# Patient Record
Sex: MALE | Race: WHITE | URBAN - METROPOLITAN AREA
[De-identification: names, ages, dates, MRNs, and addresses within clinical notes are randomized per-mention and may not be internally consistent; named-entity substitution may affect disease eponyms.]

---

## 2017-06-02 ENCOUNTER — OUTPATIENT (OUTPATIENT)
Dept: OUTPATIENT SERVICES | Facility: HOSPITAL | Age: 42
LOS: 1 days | Discharge: HOME | End: 2017-06-02

## 2017-06-28 DIAGNOSIS — D64.9 ANEMIA, UNSPECIFIED: ICD-10-CM

## 2017-06-28 DIAGNOSIS — E78.00 PURE HYPERCHOLESTEROLEMIA, UNSPECIFIED: ICD-10-CM

## 2017-06-28 DIAGNOSIS — E53.8 DEFICIENCY OF OTHER SPECIFIED B GROUP VITAMINS: ICD-10-CM

## 2017-06-28 DIAGNOSIS — Z00.00 ENCOUNTER FOR GENERAL ADULT MEDICAL EXAMINATION WITHOUT ABNORMAL FINDINGS: ICD-10-CM

## 2017-06-28 DIAGNOSIS — E55.9 VITAMIN D DEFICIENCY, UNSPECIFIED: ICD-10-CM

## 2017-06-28 DIAGNOSIS — E05.90 THYROTOXICOSIS, UNSPECIFIED WITHOUT THYROTOXIC CRISIS OR STORM: ICD-10-CM

## 2017-06-28 DIAGNOSIS — R97.20 ELEVATED PROSTATE SPECIFIC ANTIGEN [PSA]: ICD-10-CM

## 2017-06-28 DIAGNOSIS — D50.9 IRON DEFICIENCY ANEMIA, UNSPECIFIED: ICD-10-CM

## 2017-06-28 DIAGNOSIS — N39.0 URINARY TRACT INFECTION, SITE NOT SPECIFIED: ICD-10-CM

## 2017-06-28 DIAGNOSIS — E11.9 TYPE 2 DIABETES MELLITUS WITHOUT COMPLICATIONS: ICD-10-CM

## 2023-03-09 ENCOUNTER — TRANSCRIPTION ENCOUNTER (OUTPATIENT)
Age: 48
End: 2023-03-09

## 2023-03-09 ENCOUNTER — OUTPATIENT (OUTPATIENT)
Dept: INPATIENT UNIT | Facility: HOSPITAL | Age: 48
LOS: 1 days | Discharge: HOME | End: 2023-03-09
Payer: COMMERCIAL

## 2023-03-09 VITALS
OXYGEN SATURATION: 96 % | SYSTOLIC BLOOD PRESSURE: 137 MMHG | TEMPERATURE: 99 F | HEART RATE: 65 BPM | DIASTOLIC BLOOD PRESSURE: 91 MMHG | RESPIRATION RATE: 17 BRPM

## 2023-03-09 VITALS
SYSTOLIC BLOOD PRESSURE: 142 MMHG | HEIGHT: 70 IN | DIASTOLIC BLOOD PRESSURE: 80 MMHG | HEART RATE: 72 BPM | TEMPERATURE: 97 F | WEIGHT: 153 LBS

## 2023-03-09 DIAGNOSIS — D13.6 BENIGN NEOPLASM OF PANCREAS: ICD-10-CM

## 2023-03-09 DIAGNOSIS — D13.0 BENIGN NEOPLASM OF ESOPHAGUS: ICD-10-CM

## 2023-03-09 DIAGNOSIS — Z90.49 ACQUIRED ABSENCE OF OTHER SPECIFIED PARTS OF DIGESTIVE TRACT: Chronic | ICD-10-CM

## 2023-03-09 DIAGNOSIS — K86.1 OTHER CHRONIC PANCREATITIS: ICD-10-CM

## 2023-03-09 DIAGNOSIS — K86.89 OTHER SPECIFIED DISEASES OF PANCREAS: ICD-10-CM

## 2023-03-09 DIAGNOSIS — K29.50 UNSPECIFIED CHRONIC GASTRITIS WITHOUT BLEEDING: ICD-10-CM

## 2023-03-09 PROCEDURE — 88305 TISSUE EXAM BY PATHOLOGIST: CPT

## 2023-03-09 PROCEDURE — 88173 CYTOPATH EVAL FNA REPORT: CPT | Mod: 26

## 2023-03-09 PROCEDURE — 43242 EGD US FINE NEEDLE BX/ASPIR: CPT

## 2023-03-09 PROCEDURE — 88173 CYTOPATH EVAL FNA REPORT: CPT

## 2023-03-09 PROCEDURE — 88312 SPECIAL STAINS GROUP 1: CPT | Mod: 26

## 2023-03-09 PROCEDURE — 88172 CYTP DX EVAL FNA 1ST EA SITE: CPT | Mod: 26

## 2023-03-09 PROCEDURE — 88172 CYTP DX EVAL FNA 1ST EA SITE: CPT

## 2023-03-09 PROCEDURE — 88312 SPECIAL STAINS GROUP 1: CPT

## 2023-03-09 PROCEDURE — 88305 TISSUE EXAM BY PATHOLOGIST: CPT | Mod: 26

## 2023-03-09 PROCEDURE — 43239 EGD BIOPSY SINGLE/MULTIPLE: CPT | Mod: XU

## 2023-03-09 NOTE — H&P PST ADULT - RESPIRATORY AND THORAX
Vaccine Information Statement(s) or the Emergency Use Authorization was given today. This has been reviewed, questions answered, and verbal consent given by Parent for injection(s) and administration of Meningococcal  and Meningococcal Serogroup B.        Patient tolerated without incident. See immunization grid for documentation.   negative

## 2023-03-09 NOTE — ASU DISCHARGE PLAN (ADULT/PEDIATRIC) - CARE PROVIDER_API CALL
Hipolito Post)  Gastroenterology  [address] [place] [address]  Phone: [phone]  Fax: [phone]  Established Patient  Follow Up Time: Routine

## 2023-03-09 NOTE — PRE-ANESTHESIA EVALUATION ADULT - BMI (KG/M2)
Intravenous Q8H    sodium chloride  33.3 mL Intravenous Q8H    chlorhexidine  15 mL Mouth/Throat BID    hydrALAZINE  25 mg Per NG tube 3 times per day    lisinopril  20 mg Per NG tube Daily    amiodarone  200 mg Per NG tube Q8H    amLODIPine  5 mg Per NG tube Daily    gabapentin  300 mg Per NG tube 2 times per day    lamoTRIgine  200 mg Per NG tube BID    melatonin  10 mg Per NG tube Nightly    metoprolol tartrate  50 mg Per NG tube BID    insulin glargine  10 Units Subcutaneous BID    heparin flush  3 mL Intravenous 2 times per day    divalproex  125 mg Oral BID    sodium chloride flush  10 mL Intravenous 2 times per day     PRN Meds: artificial tears **OR** polyvinyl alcohol, acetaminophen **OR** acetaminophen, sodium chloride flush, heparin flush, hydrALAZINE, labetalol, opium-belladonna, albuterol, glucose, dextrose, glucagon (rDNA), sodium chloride flush      Intake/Output Summary (Last 24 hours) at 3/13/2021 0744  Last data filed at 3/13/2021 0700  Gross per 24 hour   Intake 3208.6 ml   Output 2270 ml   Net 938.6 ml       Exam:    BP (!) 168/75   Pulse 63   Temp 97.9 °F (36.6 °C)   Resp (!) 48   Ht 5' 5\" (1.651 m)   Wt 147 lb 11.3 oz (67 kg)   SpO2 100%   BMI 24.58 kg/m²     General appearance: Intubated, on full vent support. No apparent distress. Respiratory: Clear to auscultation bilaterally. Cardiovascular: Normal S1/S2. Regular rhythm and rate. Abdomen: Soft, non-tender, non-distended with normal bowel sounds. Musculoskeletal: No clubbing, cyanosis, mild hand edema noted bilaterally. Skin: Skin color, texture, turgor normal.  No rashes or lesions.   Neurologic: Sedated, eyes open to voice but does not nod yes/no to answer questions or follow commands  Peripheral Pulses: +2 palpable, equal bilaterally       Labs:   Recent Labs     03/11/21  0445 03/11/21  1445 03/12/21  0530 03/13/21  0330   WBC 22.5*  --  18.2* 12.1*   HGB 6.3* 7.5* 7.5* 7.2*   HCT 20.6* 23.3* 24.2* 22.7*   PLT 231  --  219 241     Recent Labs     03/11/21  0445 03/12/21  0530 03/13/21  0330    145 139   K 3.3* 3.1* 3.6   * 108* 102   CO2 33* 32* 34*   BUN 36* 31* 26*   CREATININE 1.4* 1.1 1.2   CALCIUM 7.8* 7.8* 8.0*   PHOS 3.4 2.5 2.5     No results for input(s): AST, ALT, BILIDIR, BILITOT, ALKPHOS in the last 72 hours. No results for input(s): INR in the last 72 hours. No results for input(s): Efrain Jackelineon in the last 72 hours. Radiology:  XR CHEST PORTABLE   Final Result   New bibasilar infiltrates         XR CHEST PORTABLE   Final Result   Clearing of right basilar atelectasis/infiltrate         XR CHEST PORTABLE   Final Result   Patchy right lung base atelectasis or infiltrate         XR CHEST PORTABLE   Final Result   Moderate persistent elevation right hemidiaphragm         XR CHEST PORTABLE   Final Result   New nonspecific hazy confluent opacity in the right lung base may reflect   atelectasis and/or pneumonitis         XR CHEST PORTABLE   Final Result   1. Satisfactory position of the endotracheal tube and NG tube. XR CHEST PORTABLE   Final Result   Bilateral lower lobe pneumonia. CT ABDOMEN PELVIS WO CONTRAST Additional Contrast? None   Final Result   Hogan catheter is in the bladder in proper position. No indication for   hemorrhage along the trajectory of the Hogan catheter or conspicuous   hemorrhage in the bladder lumen. The bladder is not distended. Pattern of severe constipation with fecal rectal retention. Bi basilar infiltrates more prominent on the right side, pneumonia considered. XR ABDOMEN FOR NG/OG/NE TUBE PLACEMENT   Final Result   NG tube appears in satisfactory position         XR CHEST PORTABLE   Final Result   Increased left greater than right basilar opacities relative to prior   comparison which may be secondary to atelectasis, edema, or developing   infection in the appropriate clinical setting.          CT HEAD WO CONTRAST   Final Result   Postoperative changes in the left suboccipital region/left lobe of the   cerebellum. Tiny amount of hemorrhage in the surgical bed is similar to only   questionably slightly more pronounced and continued follow-up is recommended. No other significant change. CT HEAD WO CONTRAST PORTABLE   Final Result   1   1. Postsurgical changes including left suboccipital craniotomy defect. 2. No evidence of acute intracranial abnormality. 3. Atrophy and chronic white matter ischemic changes. MRI BRAIN W WO CONTRAST   Final Result   1. Somewhat limited evaluation due to patient motion artifact, especially on   the postcontrast enhanced images. 2. Multiple intracranial METASTASES, most notably in the left cerebellar   hemisphere. Metastatic lesion is also seen in the region of the mammillary   bodies. 3. HYDROCEPHALUS, with dilation of the lateral and 3rd ventricles. The   etiology is unclear. Findings may be due to mass effect on the distal   aqueduct from vasogenic edema in the left cerebellar hemisphere. 4.  The findings were submitted to the Radiology Results [address]   at 13:41 on 2/27/2021  to in be communicated to a licensed caregiver.       XR CHEST PORTABLE   Final Result   No radiographic evidence of acute abnormality      XR CHEST PORTABLE    (Results Pending)   XR CHEST PORTABLE    (Results Pending)             Active Hospital Problems    Diagnosis Date Noted    Acute respiratory failure with hypoxia and hypercapnia (HCC) [J96.01, J96.02]     Acute kidney injury superimposed on CKD ([facility] [N17.9, N18.9] 03/05/2021    Palliative care by specialist [Z51.5]     Melanoma ([facility] [C43.9] 02/26/2021    Hypokalemia [E87.6] 02/26/2021    Stage 3 chronic kidney disease [N18.30] 02/26/2021    COPD (chronic obstructive pulmonary disease) ([facility] [J44.9] 02/26/2021    Constipation [K59.00] 02/26/2021    Renal cyst, left [N28.1] 02/26/2021    AMS (altered mental status) [R41.82] 02/26/2021    D[facility] [F03.90] 02/26/2021    Seizure ([facility] [R56.9] 02/26/2021    History of CVA (cerebrovascular accident) [Z86.73] 02/26/2021    Atrial fibrillation ([facility] [I48.91] 02/26/2021    Anticoagulated [Z79.01] 02/26/2021    Secondary cancer of brain ([facility] [C79.31] 02/25/2021    Leucocytosis [D72.829] 12/15/2013    Non-insulin dependent type 2 diabetes mellitus ([facility] [E11.9]        Assessment  Brain mass  suspicious for metastatic brain neoplasm with unclear primary.   Had underwent frameless service static left suboccipital craniotomy for brain tumor biopsy and debulking on 3/2  Acute metabolic encephalopathy  likely secondary to above, improving  Acute respiratory failure with hypoxia  now intubated; on full vent support  Suspected aspiration pneumonia  Paroxysmal atrial fibrillation   in sinus rhythm  Dysphagia  Hyponatremia  likely secondary to poor oral intake, corrected   Acute on chronic anemia  hemoglobin improved status post PRBC transfusion  Type 2 diabetes mellitus with  Neuropathy  Hematuria  Seizure disorder  Hypertension  History of melanoma  Constipation    Plan:  Vent management and critical care per intensivist  On [place] Lundy; vancomycin discontinued per ID  Continue with Depakote Lamictal  Awaiting pathology report  Monitor H&H, transfuse as needed to keep hemoglobin above 7 g/dL  Follow respiratory cultures, NGTD  Continue with Norvasc, lisinopril, hydralazine, Lopressor  On Amiodarone gtt  Monitor renal function, replete electrolytes as needed  Basal and corrective bolus insulin regimen  Tube feed with free fluid via NG tube  Repeat head ct due to lack of improvement    DVT prophylaxis: Subcutaneous heparin    Diet: DIET TUBE FEED CONTINUOUS/CYCLIC NPO; Diabetic; Nasogastric; Continuous; 20; 55; 24  Code Status: Full Code    PT/OT Eval Status: N/A    Dispo -TBD    Severiano Fell, DO 3/13/2021 7:44 AM 22

## 2023-03-09 NOTE — ASU DISCHARGE PLAN (ADULT/PEDIATRIC) - NS MD DC FALL RISK RISK
For information on Fall & Injury Prevention, visit: [email or web address] OR  [email or web address] OR  https://www.cdc.gov/steadi/patient.html

## 2023-03-13 LAB — SURGICAL PATHOLOGY STUDY: SIGNIFICANT CHANGE UP

## 2023-03-14 LAB — NON-GYNECOLOGICAL CYTOLOGY STUDY: SIGNIFICANT CHANGE UP

## 2023-04-15 PROBLEM — K85.90 ACUTE PANCREATITIS WITHOUT NECROSIS OR INFECTION, UNSPECIFIED: Chronic | Status: ACTIVE | Noted: 2023-03-09

## 2023-04-20 ENCOUNTER — TRANSCRIPTION ENCOUNTER (OUTPATIENT)
Age: 48
End: 2023-04-20

## 2023-04-20 ENCOUNTER — OUTPATIENT (OUTPATIENT)
Dept: INPATIENT UNIT | Facility: HOSPITAL | Age: 48
LOS: 1 days | Discharge: HOME | End: 2023-04-20
Payer: COMMERCIAL

## 2023-04-20 ENCOUNTER — RESULT REVIEW (OUTPATIENT)
Age: 48
End: 2023-04-20

## 2023-04-20 VITALS
OXYGEN SATURATION: 98 % | HEART RATE: 61 BPM | SYSTOLIC BLOOD PRESSURE: 126 MMHG | DIASTOLIC BLOOD PRESSURE: 86 MMHG | RESPIRATION RATE: 18 BRPM

## 2023-04-20 VITALS
RESPIRATION RATE: 18 BRPM | HEIGHT: 70 IN | HEART RATE: 63 BPM | DIASTOLIC BLOOD PRESSURE: 108 MMHG | SYSTOLIC BLOOD PRESSURE: 137 MMHG | TEMPERATURE: 98 F | WEIGHT: 154.98 LBS

## 2023-04-20 DIAGNOSIS — Z90.49 ACQUIRED ABSENCE OF OTHER SPECIFIED PARTS OF DIGESTIVE TRACT: Chronic | ICD-10-CM

## 2023-04-20 DIAGNOSIS — D13.0 BENIGN NEOPLASM OF ESOPHAGUS: ICD-10-CM

## 2023-04-20 DIAGNOSIS — R93.2 ABNORMAL FINDINGS ON DIAGNOSTIC IMAGING OF LIVER AND BILIARY TRACT: ICD-10-CM

## 2023-04-20 PROCEDURE — C1769: CPT

## 2023-04-20 PROCEDURE — C2617: CPT

## 2023-04-20 PROCEDURE — C1889: CPT

## 2023-04-20 PROCEDURE — 74019 RADEX ABDOMEN 2 VIEWS: CPT

## 2023-04-20 PROCEDURE — 43262 ENDO CHOLANGIOPANCREATOGRAPH: CPT | Mod: XU

## 2023-04-20 PROCEDURE — 88104 CYTOPATH FL NONGYN SMEARS: CPT

## 2023-04-20 PROCEDURE — 43261 ENDO CHOLANGIOPANCREATOGRAPH: CPT | Mod: XU

## 2023-04-20 PROCEDURE — 88104 CYTOPATH FL NONGYN SMEARS: CPT | Mod: 26

## 2023-04-20 PROCEDURE — 74329 X-RAY FOR PANCREAS ENDOSCOPY: CPT | Mod: 26

## 2023-04-20 PROCEDURE — 43274 ERCP DUCT STENT PLACEMENT: CPT

## 2023-04-20 RX ORDER — SODIUM CHLORIDE 9 MG/ML
500 INJECTION, SOLUTION INTRAVENOUS
Refills: 0 | Status: DISCONTINUED | OUTPATIENT
Start: 2023-04-20 | End: 2023-04-20

## 2023-04-20 RX ORDER — INDOMETHACIN 50 MG
100 CAPSULE ORAL ONCE
Refills: 0 | Status: COMPLETED | OUTPATIENT
Start: 2023-04-20 | End: 2023-04-20

## 2023-04-20 RX ADMIN — SODIUM CHLORIDE 500 MILLILITER(S): 9 INJECTION, SOLUTION INTRAVENOUS at 16:32

## 2023-04-20 RX ADMIN — Medication 100 MILLIGRAM(S): at 16:09

## 2023-04-20 NOTE — ASU PREOP CHECKLIST - NS PREOP CHK HIBICLENS NA
N/A Cibinqo Counseling: I discussed with the patient the risks of Cibinqo therapy including but not limited to common cold, nausea, headache, cold sores, increased blood CPK levels, dizziness, UTIs, fatigue, acne, and vomitting. Live vaccines should be avoided.  This medication has been linked to serious infections; higher rate of mortality; malignancy and lymphoproliferative disorders; major adverse cardiovascular events; thrombosis; thrombocytopenia and lymphopenia; lipid elevations; and retinal detachment.

## 2023-04-20 NOTE — ASU PATIENT PROFILE, ADULT - FALL HARM RISK - PT AGE POPULATION HIDDEN
Your diagnostic test, CT stone, is being submitted for insurance approval. You will receive a phone call from central scheduling upon approval to schedule. If you don't receive a call within 7 days, please call to schedule your test Central [phone]    Your diagnostic test will be performed at:    [place] [address]    Follow up appointment after the CT has been performed          
Adult

## 2023-04-25 LAB — NON-GYNECOLOGICAL CYTOLOGY STUDY: SIGNIFICANT CHANGE UP

## 2023-04-26 DIAGNOSIS — F17.210 NICOTINE DEPENDENCE, CIGARETTES, UNCOMPLICATED: ICD-10-CM

## 2023-04-26 DIAGNOSIS — K86.89 OTHER SPECIFIED DISEASES OF PANCREAS: ICD-10-CM

## 2023-04-26 DIAGNOSIS — K86.1 OTHER CHRONIC PANCREATITIS: ICD-10-CM

## 2023-05-09 PROBLEM — Z00.00 ENCOUNTER FOR PREVENTIVE HEALTH EXAMINATION: Status: ACTIVE | Noted: 2023-05-09

## 2023-05-18 ENCOUNTER — TRANSCRIPTION ENCOUNTER (OUTPATIENT)
Age: 48
End: 2023-05-18

## 2023-05-18 ENCOUNTER — NON-APPOINTMENT (OUTPATIENT)
Age: 48
End: 2023-05-18

## 2023-05-18 ENCOUNTER — APPOINTMENT (OUTPATIENT)
Dept: SURGERY | Facility: CLINIC | Age: 48
End: 2023-05-18
Payer: COMMERCIAL

## 2023-05-18 VITALS
HEART RATE: 73 BPM | BODY MASS INDEX: 22.9 KG/M2 | OXYGEN SATURATION: 98 % | WEIGHT: 160 LBS | SYSTOLIC BLOOD PRESSURE: 124 MMHG | HEIGHT: 70 IN | DIASTOLIC BLOOD PRESSURE: 82 MMHG | TEMPERATURE: 97 F

## 2023-05-18 PROCEDURE — 99205 OFFICE O/P NEW HI 60 MIN: CPT

## 2023-05-21 NOTE — ASSESSMENT
[FreeTextEntry1] : AMY CARRILLO  is a pleasant 48 year year old man  who came in 2023 for pancreatic duct dilatation . 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, smoking > 30 years i pack/day, \par family hx: father  at 80s with CA, mother  at 40s from brain tumor and she had diabetes, \par \par CT chest 2023: done for smoking history showed calcifications in pancreas and dilated PD to 10mm, \par MRI 2023: 8MM PD\par 3/9/2023: EUS DILATED pd 5-7MM, HYPERECHOIC STRUCTURE 9MM UNCINATE, WITHIN MAIN PD SUGGESTIVE OF STONE\par He had pancreatic stent placed on  with resolution of his pain but possible returning back recently\par \par 2023: shared concern of diffuse dilated PD, possible stone vs neoplastic disease, sending for ca 19-9, new CTs, will discuss at GI tumor board and see him in couple weeks\par \par the above plan of care with discussed in details to the patient and all questions were answered to patient satisfaction. patient instructed to follow up with the referring physician and patient primary care provider\par \par A total of 70 minutes was spent on this visit, obtaining h/p,  reviewing previous notes/imaging/scopes, counseling the patient on possible etiology and procedure , ordering tests (above), and documenting the findings in the note.\par

## 2023-05-21 NOTE — HISTORY OF PRESENT ILLNESS
[de-identified] : AMY CARRILLO  is a pleasant 48 year year old man  who came in 2023 for pancreatic duct dilatation . 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

## 2023-05-24 ENCOUNTER — RESULT REVIEW (OUTPATIENT)
Age: 48
End: 2023-05-24

## 2023-05-25 ENCOUNTER — TRANSCRIPTION ENCOUNTER (OUTPATIENT)
Age: 48
End: 2023-05-25

## 2023-05-25 ENCOUNTER — OUTPATIENT (OUTPATIENT)
Dept: INPATIENT UNIT | Facility: HOSPITAL | Age: 48
LOS: 1 days | Discharge: HOME | End: 2023-05-25
Payer: COMMERCIAL

## 2023-05-25 VITALS
RESPIRATION RATE: 17 BRPM | TEMPERATURE: 98 F | HEART RATE: 67 BPM | OXYGEN SATURATION: 95 % | DIASTOLIC BLOOD PRESSURE: 77 MMHG | SYSTOLIC BLOOD PRESSURE: 126 MMHG

## 2023-05-25 VITALS
HEART RATE: 57 BPM | DIASTOLIC BLOOD PRESSURE: 79 MMHG | TEMPERATURE: 97 F | RESPIRATION RATE: 18 BRPM | SYSTOLIC BLOOD PRESSURE: 129 MMHG | WEIGHT: 158.07 LBS

## 2023-05-25 DIAGNOSIS — Z90.49 ACQUIRED ABSENCE OF OTHER SPECIFIED PARTS OF DIGESTIVE TRACT: Chronic | ICD-10-CM

## 2023-05-25 DIAGNOSIS — K86.1 OTHER CHRONIC PANCREATITIS: ICD-10-CM

## 2023-05-25 DIAGNOSIS — D13.6 BENIGN NEOPLASM OF PANCREAS: ICD-10-CM

## 2023-05-25 PROCEDURE — 43276 ERCP STENT EXCHANGE W/DILATE: CPT

## 2023-05-25 PROCEDURE — 88112 CYTOPATH CELL ENHANCE TECH: CPT | Mod: 26

## 2023-05-25 PROCEDURE — 74328 X-RAY BILE DUCT ENDOSCOPY: CPT | Mod: 26

## 2023-05-25 PROCEDURE — 88342 IMHCHEM/IMCYTCHM 1ST ANTB: CPT

## 2023-05-25 PROCEDURE — 88341 IMHCHEM/IMCYTCHM EA ADD ANTB: CPT

## 2023-05-25 PROCEDURE — C1889: CPT

## 2023-05-25 PROCEDURE — 43261 ENDO CHOLANGIOPANCREATOGRAPH: CPT | Mod: XU

## 2023-05-25 PROCEDURE — 74018 RADEX ABDOMEN 1 VIEW: CPT

## 2023-05-25 PROCEDURE — C1769: CPT

## 2023-05-25 PROCEDURE — 88342 IMHCHEM/IMCYTCHM 1ST ANTB: CPT | Mod: 26

## 2023-05-25 PROCEDURE — 88341 IMHCHEM/IMCYTCHM EA ADD ANTB: CPT | Mod: 26

## 2023-05-25 PROCEDURE — 88112 CYTOPATH CELL ENHANCE TECH: CPT

## 2023-05-25 PROCEDURE — C2617: CPT

## 2023-05-25 RX ORDER — INDOMETHACIN 50 MG
100 CAPSULE ORAL ONCE
Refills: 0 | Status: COMPLETED | OUTPATIENT
Start: 2023-05-25 | End: 2023-05-25

## 2023-05-25 RX ADMIN — Medication 100 MILLIGRAM(S): at 13:26

## 2023-05-25 NOTE — ASU DISCHARGE PLAN (ADULT/PEDIATRIC) - NS MD DC FALL RISK RISK
For information on Fall & Injury Prevention, visit: [email or web address] OR  [email or web address] OR  https://www.cdc.gov/steadi/patient.html

## 2023-05-25 NOTE — ASU PATIENT PROFILE, ADULT - FALL HARM RISK - UNIVERSAL INTERVENTIONS
Bed in lowest position, wheels locked, appropriate side rails in place/Call bell, personal items and telephone in reach/Instruct patient to call for assistance before getting out of bed or chair/Non-slip footwear when patient is out of bed/[place] to call system/Physically safe environment - no spills, clutter or unnecessary equipment/Purposeful Proactive Rounding/Room/bathroom lighting operational, light cord in reach

## 2023-05-25 NOTE — PRE-ANESTHESIA EVALUATION ADULT - NSANTHPMHFT_GEN_ALL_CORE
49 yo smoker with chronic pancreatitis, pancreatic duct stone and dilation s/p ERCP and pancreatic stent in March 2023 returns with epigastric and back pain. Possible dislodgement of stent, presents today to repeat ERCP, stent removal, stent replacement.    PMH as above  PSH: appendectomy, ERCP, EUS

## 2023-05-25 NOTE — ASU PATIENT PROFILE, ADULT - TEACHING/LEARNING OTHER LEARNERS
Received trospium 60 mg refill request.  Refilled.  Patient has UDS coming up later this month.  
spouse

## 2023-05-30 DIAGNOSIS — K83.1 OBSTRUCTION OF BILE DUCT: ICD-10-CM

## 2023-05-30 DIAGNOSIS — Z90.49 ACQUIRED ABSENCE OF OTHER SPECIFIED PARTS OF DIGESTIVE TRACT: ICD-10-CM

## 2023-05-30 DIAGNOSIS — K86.1 OTHER CHRONIC PANCREATITIS: ICD-10-CM

## 2023-05-30 DIAGNOSIS — F17.210 NICOTINE DEPENDENCE, CIGARETTES, UNCOMPLICATED: ICD-10-CM

## 2023-06-07 LAB — NON-GYNECOLOGICAL CYTOLOGY STUDY: SIGNIFICANT CHANGE UP

## 2023-06-20 ENCOUNTER — APPOINTMENT (OUTPATIENT)
Dept: SURGERY | Facility: CLINIC | Age: 48
End: 2023-06-20
Payer: COMMERCIAL

## 2023-06-20 VITALS
BODY MASS INDEX: 23.05 KG/M2 | SYSTOLIC BLOOD PRESSURE: 122 MMHG | TEMPERATURE: 96.9 F | DIASTOLIC BLOOD PRESSURE: 74 MMHG | WEIGHT: 161 LBS | HEIGHT: 70 IN | HEART RATE: 78 BPM | OXYGEN SATURATION: 98 %

## 2023-06-20 PROCEDURE — 99215 OFFICE O/P EST HI 40 MIN: CPT

## 2023-06-29 NOTE — ASSESSMENT
[FreeTextEntry1] : AMY CARRILLO  is a pleasant 48 year year old man  who came in 2023 for pancreatic duct dilatation . 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, smoking > 30 years i pack/day, \par family hx: father  at 80s with CA, mother  at 40s from brain tumor and she had diabetes, \par \par CT chest 2023: done for smoking history showed calcifications in pancreas and dilated PD to 10mm, \par MRI 2023: 8MM PD\par 3/9/2023: EUS DILATED pd 5-7MM, HYPERECHOIC STRUCTURE 9MM UNCINATE, WITHIN MAIN PD SUGGESTIVE OF STONE\par He had pancreatic stent placed on  with resolution of his pain but possible returning back recently\par \par 2023: shared concern of diffuse dilated PD, possible stone vs neoplastic disease, sending for ca 19-9, new CTs, will discuss at GI tumor board and see him in couple weeks\par \par : he was recently admitted to [facility] for severe pain with ERCP done with change of his PD on may , he feels better now, brushing during ERCP showed atypical cells, 2023-> MRI showed enlarged head of pancreas suggestive of mild localized pancreatitis \par i discussed in details the potential of stone vs neoplastic process in head of pancreas, will need to discuss at GI tumor board and patient and his friend they have my cell to contact me in couple weeks to get the final decision, we discussed stone removal by various endoscopic technique and will discuss this with dr Post,he has coming CT in early 2023\par \par the above plan of care with discussed in details to the patient and all questions were answered to patient satisfaction. patient instructed to follow up with the referring physician and patient primary care provider\par \par A total of 45 minutes was spent on this visit, obtaining h/p,  reviewing previous notes/imaging/scopes, counseling the patient on possible etiology and procedure , ordering tests (above), and documenting the findings in the note.\par

## 2023-06-29 NOTE — HISTORY OF PRESENT ILLNESS
[de-identified] : AMY CARRILLO  is a pleasant 48 year year old man  who came in 2023 for pancreatic duct dilatation . 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, smoking > 30 years i pack/day, \par family hx: father  at 80s with CA, mother  at 40s from brain tumor and she had diabetes, \par \par CT chest 2023: done for smoking history showed calcifications in pancreas and dilated PD to 10mm, \par MRI 2023: 8MM PD\par 3/9/2023: EUS DILATED pd 5-7MM, HYPERECHOIC STRUCTURE 9MM UNCINATE, WITHIN MAIN PD SUGGESTIVE OF STONE\par He had pancreatic stent placed on  with resolution of his pain but possible returning back recently\par \par : he was recently admitted to [facility] for severe pain with ERCP done with change of his PD on may , he feels better now, brushing during ERCP showed atypical cells, 2023-> MRI showed enlarged head of pancreas suggestive of mild localized pancreatitis \par \par   \par \par

## 2023-07-10 ENCOUNTER — NON-APPOINTMENT (OUTPATIENT)
Age: 48
End: 2023-07-10

## 2023-08-06 ENCOUNTER — RESULT REVIEW (OUTPATIENT)
Age: 48
End: 2023-08-06

## 2023-08-07 ENCOUNTER — OUTPATIENT (OUTPATIENT)
Dept: INPATIENT UNIT | Facility: HOSPITAL | Age: 48
LOS: 1 days | Discharge: HOME | End: 2023-08-07
Payer: COMMERCIAL

## 2023-08-07 ENCOUNTER — TRANSCRIPTION ENCOUNTER (OUTPATIENT)
Age: 48
End: 2023-08-07

## 2023-08-07 VITALS
WEIGHT: 162.04 LBS | RESPIRATION RATE: 18 BRPM | HEIGHT: 70 IN | HEART RATE: 65 BPM | DIASTOLIC BLOOD PRESSURE: 100 MMHG | SYSTOLIC BLOOD PRESSURE: 151 MMHG | TEMPERATURE: 98 F

## 2023-08-07 VITALS
DIASTOLIC BLOOD PRESSURE: 85 MMHG | SYSTOLIC BLOOD PRESSURE: 130 MMHG | HEART RATE: 65 BPM | RESPIRATION RATE: 18 BRPM | OXYGEN SATURATION: 97 %

## 2023-08-07 DIAGNOSIS — Z90.49 ACQUIRED ABSENCE OF OTHER SPECIFIED PARTS OF DIGESTIVE TRACT: Chronic | ICD-10-CM

## 2023-08-07 DIAGNOSIS — K86.1 OTHER CHRONIC PANCREATITIS: ICD-10-CM

## 2023-08-07 DIAGNOSIS — R93.2 ABNORMAL FINDINGS ON DIAGNOSTIC IMAGING OF LIVER AND BILIARY TRACT: ICD-10-CM

## 2023-08-07 DIAGNOSIS — Z98.890 OTHER SPECIFIED POSTPROCEDURAL STATES: Chronic | ICD-10-CM

## 2023-08-07 PROCEDURE — C9399: CPT

## 2023-08-07 PROCEDURE — C1769: CPT

## 2023-08-07 PROCEDURE — 74329 X-RAY FOR PANCREAS ENDOSCOPY: CPT | Mod: 26

## 2023-08-07 PROCEDURE — 43276 ERCP STENT EXCHANGE W/DILATE: CPT

## 2023-08-07 PROCEDURE — 74019 RADEX ABDOMEN 2 VIEWS: CPT

## 2023-08-07 PROCEDURE — C1889: CPT

## 2023-08-07 PROCEDURE — C2617: CPT

## 2023-08-07 PROCEDURE — 88112 CYTOPATH CELL ENHANCE TECH: CPT | Mod: 26

## 2023-08-07 PROCEDURE — 88305 TISSUE EXAM BY PATHOLOGIST: CPT | Mod: 26

## 2023-08-07 PROCEDURE — 88112 CYTOPATH CELL ENHANCE TECH: CPT

## 2023-08-07 PROCEDURE — 43261 ENDO CHOLANGIOPANCREATOGRAPH: CPT | Mod: XU

## 2023-08-07 PROCEDURE — 88305 TISSUE EXAM BY PATHOLOGIST: CPT

## 2023-08-07 RX ORDER — LIDOCAINE 4 G/100G
0 CREAM TOPICAL
Qty: 0 | Refills: 0 | DISCHARGE

## 2023-08-07 RX ORDER — SODIUM CHLORIDE 9 MG/ML
1000 INJECTION, SOLUTION INTRAVENOUS ONCE
Refills: 0 | Status: DISCONTINUED | OUTPATIENT
Start: 2023-08-07 | End: 2023-08-07

## 2023-08-07 RX ORDER — ACETAMINOPHEN WITH CODEINE 300MG-30MG
2 TABLET ORAL ONCE
Refills: 0 | Status: DISCONTINUED | OUTPATIENT
Start: 2023-08-07 | End: 2023-08-07

## 2023-08-07 RX ORDER — INDOMETHACIN 50 MG
100 CAPSULE ORAL ONCE
Refills: 0 | Status: COMPLETED | OUTPATIENT
Start: 2023-08-07 | End: 2023-08-07

## 2023-08-07 RX ORDER — SODIUM CHLORIDE 9 MG/ML
1000 INJECTION, SOLUTION INTRAVENOUS
Refills: 0 | Status: DISCONTINUED | OUTPATIENT
Start: 2023-08-07 | End: 2023-08-07

## 2023-08-07 RX ADMIN — Medication 100 MILLIGRAM(S): at 11:01

## 2023-08-07 NOTE — ASU PATIENT PROFILE, ADULT - NSICDXPASTSURGICALHX_GEN_ALL_CORE_FT
PAST SURGICAL HISTORY:  S/P appendectomy      PAST SURGICAL HISTORY:  History of ERCP with pancreatic stent placement    S/P appendectomy

## 2023-08-07 NOTE — ASU PATIENT PROFILE, ADULT - BLOOD TRANSFUSION, PREVIOUS, PROFILE
Patient is here today for re-evaluation of type 2 diabetes which is better controlled.  The patient has lost so lost approximately 16 lb on Trulicity and just increase the dose to 1.5 mg weekly.  She denies any chest pain or excessive dyspnea.  She needs an order for a new ankle brace.The remainder of review systems is essentially negative as reviewed.  PAST MEDICAL HX:    Glucose intolerance (impaired glucose toleranc*               HTN (hypertension)                                            Hyperlipidemia                                                Obesity                                                       Stool incontinence                                              Comment: and urgency    Fibroadenoma of breast                                          Comment: left    Asthma                                                        Rosacea                                                       CTS (carpal tunnel syndrome)                                  Colon polyps                                                  DVT (deep venous thrombosis) (CMS/HCC)                        PAST SURGICAL HX:    CARPAL TUNNEL RELEASE                           2008    BREAST FIBROADENOMA SURGERY                     2007    BIOPSY OF BREAST, NEEDLE CORE                   2005    TOTAL ABDOMINAL HYSTERECTOMY                                  COLONOSCOPY                                     2011          Comment: REPEAT IN 5 YEARS    EGD                                             2020       COLONOSCOPY                                     2020         Comment: repeat in 3 years     HYSTERECTOMY                                                  Family History   Problem Relation Age of Onset   • Coronary Artery Disease Mother    • Coronary Artery Disease Father    • Cancer, Breast Sister    • Hypertension Other      Review of patient's family status indicates:    Mother                                            Father                                           Sister                         Alive                     Sister                         Alive                     Brother                        Alive                     Other                                                      Social History     Socioeconomic History   • Marital status: /Civil Union     Spouse name: Not on file   • Number of children: Not on file   • Years of education: Not on file   • Highest education level: Not on file   Occupational History   • Not on file   Tobacco Use   • Smoking status: Never Smoker   • Smokeless tobacco: Never Used   Vaping Use   • Vaping Use: never used   Substance and Sexual Activity   • Alcohol use: Yes     Alcohol/week: 0.8 standard drinks     Types: 1 Standard drinks or equivalent per week   • Drug use: No   • Sexual activity: Not on file   Other Topics Concern   • Not on file   Social History Narrative   • Not on file     Social Determinants of Health     Financial Resource Strain: Not on file   Food Insecurity: Not on file   Transportation Needs: Not on file   Physical Activity: Not on file   Stress: Not on file   Social Connections: Not on file   Intimate Partner Violence: Not At Risk   • Social Determinants: Intimate Partner Violence Past Fear: No   • Social Determinants: Intimate Partner Violence Current Fear: No     On physical examination she is a 58-year-old female.  Visit Vitals  BP (!) 138/91 (BP Location: RUE - Right upper extremity, Patient Position: Sitting, Cuff Size: Large Adult)   Pulse 69   Ht 5' 2\" (1.575 m)   Wt 90.7 kg (200 lb)   BMI 36.58 kg/m²     SKIN:  Warm and hydrated without evidence of skin rashes, abnormal lesions, or palpable skin abnormalities.   HEENT:  Unremarkable.   CHEST:  Clear to auscultation with no wheezing or rales, normal to percussion.   HEART:  S1, S2, regular, rhythmic, with no murmur or rub.   ABDOMEN:  Soft, nontender, nondistended, with normal  bowel sounds.  No evidence of hepatosplenomegaly.   EXTREMITIES:  No pedal edema and normal peripheral pulses.   There is no evidence of calluses ulceration and sensation is preserved.      Assessment:     1. Essential hypertension recheck blood pressure   2. Class 2 obesity due to excess calories without serious comorbidity with body mass index (BMI) of 35.0 to 35.9 in adult continue Trulicity the higher dose   3. Mixed hyperlipidemia stable continue the same   4. Type 2 diabetes mellitus without complication, without long-term current use of insulin (CMS/[facility]) see above   5. Medication monitoring encounter    6. Health care maintenance return in six months for a complete physical exam.     Patient receive vaccination through her 's workplace.   no

## 2023-08-09 ENCOUNTER — NON-APPOINTMENT (OUTPATIENT)
Age: 48
End: 2023-08-09

## 2023-08-10 DIAGNOSIS — K86.1 OTHER CHRONIC PANCREATITIS: ICD-10-CM

## 2023-08-10 DIAGNOSIS — K86.89 OTHER SPECIFIED DISEASES OF PANCREAS: ICD-10-CM

## 2023-08-10 LAB — NON-GYNECOLOGICAL CYTOLOGY STUDY: SIGNIFICANT CHANGE UP

## 2023-08-14 PROBLEM — M54.9 DORSALGIA, UNSPECIFIED: Chronic | Status: ACTIVE | Noted: 2023-08-07

## 2023-09-01 ENCOUNTER — NON-APPOINTMENT (OUTPATIENT)
Age: 48
End: 2023-09-01

## 2023-09-05 ENCOUNTER — APPOINTMENT (OUTPATIENT)
Dept: SURGERY | Facility: CLINIC | Age: 48
End: 2023-09-05

## 2023-10-04 ENCOUNTER — APPOINTMENT (OUTPATIENT)
Dept: GASTROENTEROLOGY | Facility: CLINIC | Age: 48
End: 2023-10-04
Payer: COMMERCIAL

## 2023-10-04 DIAGNOSIS — D49.0 NEOPLASM OF UNSPECIFIED BEHAVIOR OF DIGESTIVE SYSTEM: ICD-10-CM

## 2023-10-04 PROCEDURE — 99443: CPT

## 2023-10-05 RX ORDER — OXYCODONE AND ACETAMINOPHEN 10; 325 MG/1; MG/1
10-325 TABLET ORAL
Refills: 0 | Status: ACTIVE | COMMUNITY

## 2023-10-12 ENCOUNTER — TRANSCRIPTION ENCOUNTER (OUTPATIENT)
Age: 48
End: 2023-10-12

## 2023-10-12 ENCOUNTER — OUTPATIENT (OUTPATIENT)
Dept: OUTPATIENT SERVICES | Facility: HOSPITAL | Age: 48
LOS: 1 days | Discharge: HOME | End: 2023-10-12
Payer: COMMERCIAL

## 2023-10-12 VITALS
HEART RATE: 58 BPM | DIASTOLIC BLOOD PRESSURE: 74 MMHG | OXYGEN SATURATION: 96 % | RESPIRATION RATE: 18 BRPM | SYSTOLIC BLOOD PRESSURE: 131 MMHG

## 2023-10-12 VITALS
DIASTOLIC BLOOD PRESSURE: 79 MMHG | RESPIRATION RATE: 18 BRPM | TEMPERATURE: 97 F | HEIGHT: 69 IN | SYSTOLIC BLOOD PRESSURE: 124 MMHG | WEIGHT: 166.01 LBS | HEART RATE: 55 BPM

## 2023-10-12 DIAGNOSIS — K86.89 OTHER SPECIFIED DISEASES OF PANCREAS: ICD-10-CM

## 2023-10-12 DIAGNOSIS — Z98.890 OTHER SPECIFIED POSTPROCEDURAL STATES: Chronic | ICD-10-CM

## 2023-10-12 DIAGNOSIS — Z90.49 ACQUIRED ABSENCE OF OTHER SPECIFIED PARTS OF DIGESTIVE TRACT: Chronic | ICD-10-CM

## 2023-10-12 DIAGNOSIS — D49.0 NEOPLASM OF UNSPECIFIED BEHAVIOR OF DIGESTIVE SYSTEM: ICD-10-CM

## 2023-10-12 PROCEDURE — 43264 ERCP REMOVE DUCT CALCULI: CPT | Mod: XU

## 2023-10-12 PROCEDURE — 74018 RADEX ABDOMEN 1 VIEW: CPT

## 2023-10-12 PROCEDURE — 43276 ERCP STENT EXCHANGE W/DILATE: CPT

## 2023-10-12 PROCEDURE — 43262 ENDO CHOLANGIOPANCREATOGRAPH: CPT | Mod: XU

## 2023-10-12 PROCEDURE — C1769: CPT

## 2023-10-12 PROCEDURE — C1889: CPT

## 2023-10-12 PROCEDURE — 43274 ERCP DUCT STENT PLACEMENT: CPT | Mod: XU

## 2023-10-12 PROCEDURE — C2617: CPT

## 2023-10-12 PROCEDURE — 74330 X-RAY BILE/PANC ENDOSCOPY: CPT | Mod: 26

## 2023-10-12 RX ORDER — INDOMETHACIN 50 MG
100 CAPSULE ORAL ONCE
Refills: 0 | Status: DISCONTINUED | OUTPATIENT
Start: 2023-10-12 | End: 2023-10-12

## 2023-10-12 NOTE — ASU PATIENT PROFILE, ADULT - PATIENT KNOW
Patient presents to the clinic today for catheter bag exchange. Patient was discharged from the hospital today after an 8 day stay d/t a heart attack. His catheter was exchanged in the hospital on 9/5/19, so the catheter doesn't need to be exchanged today, as it is draining fine and looks patent. Patient was discharged home with an overnight drainage bag with a plastic measuring container attached to the outside, which the patient wasn't too fond of. Overnight drainage bag removed and a leg bag was attached. Patient will follow up in 3 weeks for his catheter change.  
yes

## 2023-10-12 NOTE — ASU PREOP CHECKLIST - IV STARTED
What Type Of Note Output Would You Prefer (Optional)?: Standard Output Is The Patient Presenting As Previously Scheduled?: Yes How Severe Is Your Rash?: mild Is This A New Presentation, Or A Follow-Up?: Rash Additional History: Pts pediatrician thinks he has Acanthosis nigricans. no

## 2023-10-12 NOTE — ASU PATIENT PROFILE, ADULT - SURGICAL SITE INCISION
no Otolaryngologist Procedure Text (A): After obtaining clear surgical margins the patient was sent to otolaryngology for surgical repair.  The patient understands they will receive post-surgical care and follow-up from the referring physician's office.

## 2023-10-12 NOTE — ASU PATIENT PROFILE, ADULT - FALL HARM RISK - UNIVERSAL INTERVENTIONS
Bed in lowest position, wheels locked, appropriate side rails in place/Call bell, personal items and telephone in reach/Instruct patient to call for assistance before getting out of bed or chair/Non-slip footwear when patient is out of bed/[place] to call system/Physically safe environment - no spills, clutter or unnecessary equipment/Purposeful Proactive Rounding/Room/bathroom lighting operational, light cord in reach

## 2023-10-12 NOTE — ASU DISCHARGE PLAN (ADULT/PEDIATRIC) - CARE PROVIDER_API CALL
Hipolito Post  Gastroenterology  [address] [place] [address]  Phone: [phone]  Fax: [phone]  Follow Up Time:

## 2023-10-12 NOTE — CHART NOTE - NSCHARTNOTEFT_GEN_A_CORE
PACU ANESTHESIA ADMISSION NOTE      Procedure: ercp stent removal and replacement  Post op diagnosis:      ____  Intubated  TV:______       Rate: ______      FiO2: ______    __x__  Patent Airway    ____  Full return of protective reflexes    ____  Full recovery from anesthesia / back to baseline     Vitals:   T:  97.2         R: 16                 BP:   161/114               Sat:  100%                 P: 83      Mental Status:  __x__ Awake   _____ Alert   _____ Drowsy   _____ Sedated    Nausea/Vomiting:  ____ NO  ______Yes,   See Post - Op Orders          Pain Scale (0-10):  _____    Treatment: ____ None    ___x_ See Post - Op/PCA Orders    Post - Operative Fluids:   ____ Oral   __x__ See Post - Op Orders    Plan: Discharge:   __x__Home       _____Floor     _____Critical Care    _____  Other:_________________    Comments: Pt tolerated procedure well, no anesthesia related complications. Care of pt endorsed to PACU, report given to PACU RN. Discharge when criteria are met.

## 2023-10-18 DIAGNOSIS — K86.89 OTHER SPECIFIED DISEASES OF PANCREAS: ICD-10-CM

## 2023-10-23 ENCOUNTER — NON-APPOINTMENT (OUTPATIENT)
Age: 48
End: 2023-10-23

## 2023-10-25 ENCOUNTER — APPOINTMENT (OUTPATIENT)
Dept: GASTROENTEROLOGY | Facility: CLINIC | Age: 48
End: 2023-10-25
Payer: COMMERCIAL

## 2023-10-25 ENCOUNTER — RESULT REVIEW (OUTPATIENT)
Age: 48
End: 2023-10-25

## 2023-10-25 PROCEDURE — 99214 OFFICE O/P EST MOD 30 MIN: CPT | Mod: 95

## 2023-11-02 ENCOUNTER — OUTPATIENT (OUTPATIENT)
Dept: OUTPATIENT SERVICES | Facility: HOSPITAL | Age: 48
LOS: 1 days | End: 2023-11-02
Payer: COMMERCIAL

## 2023-11-02 DIAGNOSIS — Z98.890 OTHER SPECIFIED POSTPROCEDURAL STATES: Chronic | ICD-10-CM

## 2023-11-02 DIAGNOSIS — K86.9 DISEASE OF PANCREAS, UNSPECIFIED: ICD-10-CM

## 2023-11-02 DIAGNOSIS — Z90.49 ACQUIRED ABSENCE OF OTHER SPECIFIED PARTS OF DIGESTIVE TRACT: Chronic | ICD-10-CM

## 2023-11-02 PROCEDURE — 74018 RADEX ABDOMEN 1 VIEW: CPT

## 2023-11-02 PROCEDURE — 76000 FLUOROSCOPY <1 HR PHYS/QHP: CPT | Mod: 26

## 2023-11-03 DIAGNOSIS — K86.9 DISEASE OF PANCREAS, UNSPECIFIED: ICD-10-CM

## 2023-11-22 ENCOUNTER — APPOINTMENT (OUTPATIENT)
Dept: GASTROENTEROLOGY | Facility: CLINIC | Age: 48
End: 2023-11-22
Payer: COMMERCIAL

## 2023-11-22 PROCEDURE — 99213 OFFICE O/P EST LOW 20 MIN: CPT | Mod: 95

## 2023-11-28 RX ORDER — PANCRELIPASE 36000; 180000; 114000 [USP'U]/1; [USP'U]/1; [USP'U]/1
36000-114000 CAPSULE, DELAYED RELEASE PELLETS ORAL 3 TIMES DAILY
Qty: 270 | Refills: 3 | Status: ACTIVE | COMMUNITY
Start: 2023-11-27 | End: 1900-01-01

## 2023-12-01 ENCOUNTER — EMERGENCY (EMERGENCY)
Facility: HOSPITAL | Age: 48
LOS: 0 days | Discharge: LEFT BEFORE DISCHARGE | End: 2023-12-01
Attending: STUDENT IN AN ORGANIZED HEALTH CARE EDUCATION/TRAINING PROGRAM
Payer: COMMERCIAL

## 2023-12-01 VITALS
RESPIRATION RATE: 20 BRPM | TEMPERATURE: 98 F | OXYGEN SATURATION: 99 % | HEIGHT: 69 IN | DIASTOLIC BLOOD PRESSURE: 96 MMHG | WEIGHT: 162.92 LBS | HEART RATE: 85 BPM | SYSTOLIC BLOOD PRESSURE: 131 MMHG

## 2023-12-01 DIAGNOSIS — Z53.29 PROCEDURE AND TREATMENT NOT CARRIED OUT BECAUSE OF PATIENT'S DECISION FOR OTHER REASONS: ICD-10-CM

## 2023-12-01 DIAGNOSIS — Z87.19 PERSONAL HISTORY OF OTHER DISEASES OF THE DIGESTIVE SYSTEM: ICD-10-CM

## 2023-12-01 DIAGNOSIS — R74.02 ELEVATION OF LEVELS OF LACTIC ACID DEHYDROGENASE [LDH]: ICD-10-CM

## 2023-12-01 DIAGNOSIS — K85.90 ACUTE PANCREATITIS WITHOUT NECROSIS OR INFECTION, UNSPECIFIED: ICD-10-CM

## 2023-12-01 DIAGNOSIS — Z90.49 ACQUIRED ABSENCE OF OTHER SPECIFIED PARTS OF DIGESTIVE TRACT: Chronic | ICD-10-CM

## 2023-12-01 DIAGNOSIS — R10.13 EPIGASTRIC PAIN: ICD-10-CM

## 2023-12-01 DIAGNOSIS — Z98.890 OTHER SPECIFIED POSTPROCEDURAL STATES: Chronic | ICD-10-CM

## 2023-12-01 DIAGNOSIS — K86.1 OTHER CHRONIC PANCREATITIS: ICD-10-CM

## 2023-12-01 DIAGNOSIS — Z90.49 ACQUIRED ABSENCE OF OTHER SPECIFIED PARTS OF DIGESTIVE TRACT: ICD-10-CM

## 2023-12-01 DIAGNOSIS — Z96.89 PRESENCE OF OTHER SPECIFIED FUNCTIONAL IMPLANTS: ICD-10-CM

## 2023-12-01 LAB
ALBUMIN SERPL ELPH-MCNC: 4.8 G/DL — SIGNIFICANT CHANGE UP (ref 3.5–5.2)
ALBUMIN SERPL ELPH-MCNC: 4.8 G/DL — SIGNIFICANT CHANGE UP (ref 3.5–5.2)
ALP SERPL-CCNC: 85 U/L — SIGNIFICANT CHANGE UP (ref 30–115)
ALP SERPL-CCNC: 85 U/L — SIGNIFICANT CHANGE UP (ref 30–115)
ALT FLD-CCNC: 16 U/L — SIGNIFICANT CHANGE UP (ref 0–41)
ALT FLD-CCNC: 16 U/L — SIGNIFICANT CHANGE UP (ref 0–41)
ANION GAP SERPL CALC-SCNC: 11 MMOL/L — SIGNIFICANT CHANGE UP (ref 7–14)
ANION GAP SERPL CALC-SCNC: 11 MMOL/L — SIGNIFICANT CHANGE UP (ref 7–14)
AST SERPL-CCNC: 17 U/L — SIGNIFICANT CHANGE UP (ref 0–41)
AST SERPL-CCNC: 17 U/L — SIGNIFICANT CHANGE UP (ref 0–41)
BASOPHILS # BLD AUTO: 0.11 K/UL — SIGNIFICANT CHANGE UP (ref 0–0.2)
BASOPHILS # BLD AUTO: 0.11 K/UL — SIGNIFICANT CHANGE UP (ref 0–0.2)
BASOPHILS NFR BLD AUTO: 0.9 % — SIGNIFICANT CHANGE UP (ref 0–1)
BASOPHILS NFR BLD AUTO: 0.9 % — SIGNIFICANT CHANGE UP (ref 0–1)
BILIRUB DIRECT SERPL-MCNC: <0.2 MG/DL — SIGNIFICANT CHANGE UP (ref 0–0.3)
BILIRUB DIRECT SERPL-MCNC: <0.2 MG/DL — SIGNIFICANT CHANGE UP (ref 0–0.3)
BILIRUB INDIRECT FLD-MCNC: SIGNIFICANT CHANGE UP MG/DL (ref 0.2–1.2)
BILIRUB INDIRECT FLD-MCNC: SIGNIFICANT CHANGE UP MG/DL (ref 0.2–1.2)
BILIRUB SERPL-MCNC: <0.2 MG/DL — SIGNIFICANT CHANGE UP (ref 0.2–1.2)
BUN SERPL-MCNC: 12 MG/DL — SIGNIFICANT CHANGE UP (ref 10–20)
BUN SERPL-MCNC: 12 MG/DL — SIGNIFICANT CHANGE UP (ref 10–20)
CALCIUM SERPL-MCNC: 9.8 MG/DL — SIGNIFICANT CHANGE UP (ref 8.4–10.4)
CALCIUM SERPL-MCNC: 9.8 MG/DL — SIGNIFICANT CHANGE UP (ref 8.4–10.4)
CHLORIDE SERPL-SCNC: 100 MMOL/L — SIGNIFICANT CHANGE UP (ref 98–110)
CHLORIDE SERPL-SCNC: 100 MMOL/L — SIGNIFICANT CHANGE UP (ref 98–110)
CO2 SERPL-SCNC: 27 MMOL/L — SIGNIFICANT CHANGE UP (ref 17–32)
CO2 SERPL-SCNC: 27 MMOL/L — SIGNIFICANT CHANGE UP (ref 17–32)
CREAT SERPL-MCNC: 0.9 MG/DL — SIGNIFICANT CHANGE UP (ref 0.7–1.5)
CREAT SERPL-MCNC: 0.9 MG/DL — SIGNIFICANT CHANGE UP (ref 0.7–1.5)
EGFR: 104 ML/MIN/1.73M2 — SIGNIFICANT CHANGE UP
EGFR: 104 ML/MIN/1.73M2 — SIGNIFICANT CHANGE UP
EOSINOPHIL # BLD AUTO: 0.15 K/UL — SIGNIFICANT CHANGE UP (ref 0–0.7)
EOSINOPHIL # BLD AUTO: 0.15 K/UL — SIGNIFICANT CHANGE UP (ref 0–0.7)
EOSINOPHIL NFR BLD AUTO: 1.2 % — SIGNIFICANT CHANGE UP (ref 0–8)
EOSINOPHIL NFR BLD AUTO: 1.2 % — SIGNIFICANT CHANGE UP (ref 0–8)
GLUCOSE SERPL-MCNC: 224 MG/DL — HIGH (ref 70–99)
GLUCOSE SERPL-MCNC: 224 MG/DL — HIGH (ref 70–99)
HCT VFR BLD CALC: 41.1 % — LOW (ref 42–52)
HCT VFR BLD CALC: 41.1 % — LOW (ref 42–52)
HGB BLD-MCNC: 13.5 G/DL — LOW (ref 14–18)
HGB BLD-MCNC: 13.5 G/DL — LOW (ref 14–18)
IMM GRANULOCYTES NFR BLD AUTO: 0.2 % — SIGNIFICANT CHANGE UP (ref 0.1–0.3)
IMM GRANULOCYTES NFR BLD AUTO: 0.2 % — SIGNIFICANT CHANGE UP (ref 0.1–0.3)
LACTATE SERPL-SCNC: 3.5 MMOL/L — HIGH (ref 0.7–2)
LACTATE SERPL-SCNC: 3.5 MMOL/L — HIGH (ref 0.7–2)
LIDOCAIN IGE QN: 79 U/L — HIGH (ref 7–60)
LIDOCAIN IGE QN: 79 U/L — HIGH (ref 7–60)
LYMPHOCYTES # BLD AUTO: 27.1 % — SIGNIFICANT CHANGE UP (ref 20.5–51.1)
LYMPHOCYTES # BLD AUTO: 27.1 % — SIGNIFICANT CHANGE UP (ref 20.5–51.1)
LYMPHOCYTES # BLD AUTO: 3.26 K/UL — SIGNIFICANT CHANGE UP (ref 1.2–3.4)
LYMPHOCYTES # BLD AUTO: 3.26 K/UL — SIGNIFICANT CHANGE UP (ref 1.2–3.4)
MCHC RBC-ENTMCNC: 30.1 PG — SIGNIFICANT CHANGE UP (ref 27–31)
MCHC RBC-ENTMCNC: 30.1 PG — SIGNIFICANT CHANGE UP (ref 27–31)
MCHC RBC-ENTMCNC: 32.8 G/DL — SIGNIFICANT CHANGE UP (ref 32–37)
MCHC RBC-ENTMCNC: 32.8 G/DL — SIGNIFICANT CHANGE UP (ref 32–37)
MCV RBC AUTO: 91.7 FL — SIGNIFICANT CHANGE UP (ref 80–94)
MCV RBC AUTO: 91.7 FL — SIGNIFICANT CHANGE UP (ref 80–94)
MONOCYTES # BLD AUTO: 0.99 K/UL — HIGH (ref 0.1–0.6)
MONOCYTES # BLD AUTO: 0.99 K/UL — HIGH (ref 0.1–0.6)
MONOCYTES NFR BLD AUTO: 8.2 % — SIGNIFICANT CHANGE UP (ref 1.7–9.3)
MONOCYTES NFR BLD AUTO: 8.2 % — SIGNIFICANT CHANGE UP (ref 1.7–9.3)
NEUTROPHILS # BLD AUTO: 7.47 K/UL — HIGH (ref 1.4–6.5)
NEUTROPHILS # BLD AUTO: 7.47 K/UL — HIGH (ref 1.4–6.5)
NEUTROPHILS NFR BLD AUTO: 62.4 % — SIGNIFICANT CHANGE UP (ref 42.2–75.2)
NEUTROPHILS NFR BLD AUTO: 62.4 % — SIGNIFICANT CHANGE UP (ref 42.2–75.2)
NRBC # BLD: 0 /100 WBCS — SIGNIFICANT CHANGE UP (ref 0–0)
NRBC # BLD: 0 /100 WBCS — SIGNIFICANT CHANGE UP (ref 0–0)
PLATELET # BLD AUTO: 313 K/UL — SIGNIFICANT CHANGE UP (ref 130–400)
PLATELET # BLD AUTO: 313 K/UL — SIGNIFICANT CHANGE UP (ref 130–400)
PMV BLD: 11.1 FL — HIGH (ref 7.4–10.4)
PMV BLD: 11.1 FL — HIGH (ref 7.4–10.4)
POTASSIUM SERPL-MCNC: 4.7 MMOL/L — SIGNIFICANT CHANGE UP (ref 3.5–5)
POTASSIUM SERPL-MCNC: 4.7 MMOL/L — SIGNIFICANT CHANGE UP (ref 3.5–5)
POTASSIUM SERPL-SCNC: 4.7 MMOL/L — SIGNIFICANT CHANGE UP (ref 3.5–5)
POTASSIUM SERPL-SCNC: 4.7 MMOL/L — SIGNIFICANT CHANGE UP (ref 3.5–5)
PROT SERPL-MCNC: 7.4 G/DL — SIGNIFICANT CHANGE UP (ref 6–8)
PROT SERPL-MCNC: 7.4 G/DL — SIGNIFICANT CHANGE UP (ref 6–8)
RBC # BLD: 4.48 M/UL — LOW (ref 4.7–6.1)
RBC # BLD: 4.48 M/UL — LOW (ref 4.7–6.1)
RBC # FLD: 14.8 % — HIGH (ref 11.5–14.5)
RBC # FLD: 14.8 % — HIGH (ref 11.5–14.5)
SODIUM SERPL-SCNC: 138 MMOL/L — SIGNIFICANT CHANGE UP (ref 135–146)
SODIUM SERPL-SCNC: 138 MMOL/L — SIGNIFICANT CHANGE UP (ref 135–146)
WBC # BLD: 12.01 K/UL — HIGH (ref 4.8–10.8)
WBC # BLD: 12.01 K/UL — HIGH (ref 4.8–10.8)
WBC # FLD AUTO: 12.01 K/UL — HIGH (ref 4.8–10.8)
WBC # FLD AUTO: 12.01 K/UL — HIGH (ref 4.8–10.8)

## 2023-12-01 PROCEDURE — 99285 EMERGENCY DEPT VISIT HI MDM: CPT

## 2023-12-01 PROCEDURE — 82247 BILIRUBIN TOTAL: CPT

## 2023-12-01 PROCEDURE — 85025 COMPLETE CBC W/AUTO DIFF WBC: CPT

## 2023-12-01 PROCEDURE — 74177 CT ABD & PELVIS W/CONTRAST: CPT | Mod: MA

## 2023-12-01 PROCEDURE — 82248 BILIRUBIN DIRECT: CPT

## 2023-12-01 PROCEDURE — 71045 X-RAY EXAM CHEST 1 VIEW: CPT

## 2023-12-01 PROCEDURE — 71045 X-RAY EXAM CHEST 1 VIEW: CPT | Mod: 26

## 2023-12-01 PROCEDURE — 80053 COMPREHEN METABOLIC PANEL: CPT

## 2023-12-01 PROCEDURE — 83605 ASSAY OF LACTIC ACID: CPT

## 2023-12-01 PROCEDURE — 74177 CT ABD & PELVIS W/CONTRAST: CPT | Mod: 26,MA

## 2023-12-01 PROCEDURE — 36415 COLL VENOUS BLD VENIPUNCTURE: CPT

## 2023-12-01 PROCEDURE — 99284 EMERGENCY DEPT VISIT MOD MDM: CPT | Mod: 25

## 2023-12-01 PROCEDURE — 83690 ASSAY OF LIPASE: CPT

## 2023-12-01 RX ORDER — SODIUM CHLORIDE 9 MG/ML
1000 INJECTION INTRAMUSCULAR; INTRAVENOUS; SUBCUTANEOUS ONCE
Refills: 0 | Status: COMPLETED | OUTPATIENT
Start: 2023-12-01 | End: 2023-12-01

## 2023-12-01 RX ADMIN — SODIUM CHLORIDE 1000 MILLILITER(S): 9 INJECTION INTRAMUSCULAR; INTRAVENOUS; SUBCUTANEOUS at 18:23

## 2023-12-01 NOTE — ED PROVIDER NOTE - PROGRESS NOTE DETAILS
FELIX Harrison - Consulted GI for Acute on Chronic Pancreatitis in setting of pancreatic stent who states that patient will be seen in Am considered for scope. Patient feels well at this time and is relieved that stent is in position. Given that lactic elevated with persistent pain and pancreatitis on CT, recommended admission however patient prefers to go home and follow with Dr. Post. MONSE. I had extensive discussion of risks and benefits of pursuing further medical evaluation and/or care with patient and any available family/friends, including but not limited to worsening of clinical status, disability, and death; patient still electing to leave against medical advice. Patient is awake, alert, oriented and demonstrates full capacity and insight into illness. Patient aware and encouraged to return immediately to this ED or nearest ED if patient decides to change mind regarding care or if patient experiences any new, worsening, or concerning symptoms. Any available test results were discussed with patient and/or family. Verbal instructions given, patient endorsed understanding. Written discharge instructions additionally given, including follow-up plan.

## 2023-12-01 NOTE — ED PROVIDER NOTE - PATIENT PORTAL LINK FT
You can access the FollowMyHealth Patient Portal offered by [facility] by registering at the following website: [email or web address]. By joining [employer]’s FollowMyHealth portal, you will also be able to view your health information using other applications (apps) compatible with our system. You can access the FollowMyHealth Patient Portal offered by [facility] by registering at the following website: [email or web address]. By joining [employer]’s FollowMyHealth portal, you will also be able to view your health information using other applications (apps) compatible with our system. You can access the FollowMyHealth Patient Portal offered by [facility] by registering at the following website: [email or web address]. By joining [employer]’s FollowMyHealth portal, you will also be able to view your health information using other applications (apps) compatible with our system.

## 2023-12-01 NOTE — ED PROVIDER NOTE - CARE PROVIDERS DIRECT ADDRESSES
[email or web address]

## 2023-12-01 NOTE — ED PROVIDER NOTE - PHYSICAL EXAMINATION
Physical Exam    Constitutional: No acute distress.   Eyes: Conjunctiva pink, Sclera clear, PERRLA, EOMI.  ENT: No sinus tenderness. No nasal discharge. No oropharyngeal erythema, edema, or exudates. Uvula midline.   Cardiovascular: Regular rate, regular rhythm. No noted murmurs rubs or gallops.  Respiratory: unlabored respiratory effort, clear to auscultation bilaterally no wheezing, rales or rhonchi  Gastrointestinal: Normal bowel sounds. soft, non distended, epigastric tenderness to palpation  Musculoskeletal: supple neck, no midline tenderness. No joint or bony deformity.   Integumentary: warm, dry, no rash  Neurologic: awake, alert, cranial nerves II-XII grossly intact, extremities’ motor and sensory functions grossly intact  Psychiatric: appropriate mood, appropriate affect

## 2023-12-01 NOTE — ED PROVIDER NOTE - OBJECTIVE STATEMENT
48 year old male with a history of pancreatic ductal stone s/p pancreatic stents requiring multiple revisions presents to the ED with abdominal pain. Patient states that 2 weeks ago he was mowing the lawn when he felt a pain in his epigastrium. Since then he has had constant pain that is intermittently sharp. He has been taking Oxycodone 40mg at home as needed for the pain which relieves symptoms however he is concerned that the stents moved. He tried to call his GI Dr. Post however he is out of the office at this time. Denies fever, chills, chest pain, shortness of breath, nausea, vomiting, diarrhea, constipation, dysuria, hematuria, lower extremity swelling, rash.

## 2023-12-01 NOTE — ED PROVIDER NOTE - CARE PROVIDER_API CALL
Hipolito Post  Gastroenterology  [address] [place] [address]  Phone: [phone]  Fax: [phone]  Follow Up Time:    Hipolito Post  Gastroenterology  [address] [place] [address]  Phone: [phone]  Fax: [phone]  Follow Up Time:    Hipolito Post  Gastroenterology  [address] [place] [address]  Phone: [phone]  Fax: [phone]  Follow Up Time:

## 2023-12-01 NOTE — ED PROVIDER NOTE - CLINICAL SUMMARY MEDICAL DECISION MAKING FREE TEXT BOX
48-year-old male that presents with abdominal pain.  Concern for possible pancreatitis or displacement of stents.  Labs imaging pain management reassess dispo pending.  Of note patient wishes to leave AMA. I had extensive discussion of risks and benefits of pursuing further medical evaluation and/or care with patient and any available family/friends, including but not limited to worsening of clinical status, disability, and death; patient still electing to leave against medical advice. Patient is awake, alert, oriented and demonstrates full capacity and insight into illness. Patient aware and encouraged to return immediately to this ED or nearest ED if patient decides to change mind regarding care or if patient experiences any new, worsening, or concerning symptoms. Any available test results were discussed with patient and/or family. Verbal instructions given, patient endorsed understanding. Written discharge instructions additionally given, including follow-up plan.

## 2023-12-01 NOTE — ED PROVIDER NOTE - NSFOLLOWUPINSTRUCTIONS_ED_ALL_ED_FT
Please follow up with Dr. Post during next available appointment. Please return to the ED if you develop new or worsening symptoms suggestive of stent migration or worsening pancreatitis.     Acute Abdominal Pain    WHAT YOU NEED TO KNOW:    The cause of your abdominal pain may not be found. If a cause is found, treatment will depend on what the cause is.     DISCHARGE INSTRUCTIONS:    Return to the emergency department if:     You vomit blood or cannot stop vomiting.      You have blood in your bowel movement or it looks like tar.       You have bleeding from your rectum.       Your abdomen is larger than usual, more painful, and hard.       You have severe pain in your abdomen.       You stop passing gas and having bowel movements.       You feel weak, dizzy, or faint.    Contact your healthcare provider if:     You have a fever.      You have new signs and symptoms.      Your symptoms do not get better with treatment.       You have questions or concerns about your condition or care.    Medicines may be given to decrease pain, treat an infection, and manage your symptoms. Take your medicine as directed. Call your healthcare provider if you think your medicine is not helping or if you have side effects. Tell him if you are allergic to any medicine. Keep a list of the medicines, vitamins, and herbs you take. Include the amounts, and when and why you take them. Bring the list or the pill bottles to follow-up visits. Carry your medicine list with you in case of an emergency.    Manage your symptoms:     Apply heat on your abdomen for 20 to 30 minutes every 2 hours for as many days as directed. Heat helps decrease pain and muscle spasms.       Manage your stress. Stress may cause abdominal pain. Your healthcare provider may recommend relaxation techniques and deep breathing exercises to help decrease your stress. Your healthcare provider may recommend you talk to someone about your stress or anxiety, such as a counselor or a trusted friend. Get plenty of sleep and exercise regularly.       Limit or do not drink alcohol. Alcohol can make your abdominal pain worse. Ask your healthcare provider if it is safe for you to drink alcohol. Also ask how much is safe for you to drink.       Do not smoke. Nicotine and other chemicals in cigarettes can damage your esophagus and stomach. Ask your healthcare provider for information if you currently smoke and need help to quit. E-cigarettes or smokeless tobacco still contain nicotine. Talk to your healthcare provider before you use these products.     Make changes to the food you eat as directed: Do not eat foods that cause abdominal pain or other symptoms. Eat small meals more often.     Eat more high-fiber foods if you are constipated. High-fiber foods include fruits, vegetables, whole-grain foods, and legumes.       Do not eat foods that cause gas if you have bloating. Examples include broccoli, cabbage, and cauliflower. Do not drink soda or carbonated drinks, because these may also cause gas.       Do not eat foods or drinks that contain sorbitol or fructose if you have diarrhea and bloating. Some examples are fruit juices, candy, jelly, and sugar-free gum.       Do not eat high-fat foods, such as fried foods, cheeseburgers, hot dogs, and desserts.      Limit or do not drink caffeine. Caffeine may make symptoms, such as heart burn or nausea, worse.       Drink plenty of liquids to prevent dehydration from diarrhea or vomiting. Ask your healthcare provider how much liquid to drink each day and which liquids are best for you.     Follow up with your healthcare provider as directed: Write down your questions so you remember to ask them during your visits.       © Copyright [employer] 2019 All illustrations and images included in CareNotes are the copyrighted property of [employer] Inc[employer] or [employer] Please follow up with Dr. Post during next available appointment. Please return to the ED if you develop new or worsening symptoms suggestive of stent migration or worsening pancreatitis.     Acute Abdominal Pain    WHAT YOU NEED TO KNOW:    The cause of your abdominal pain may not be found. If a cause is found, treatment will depend on what the cause is.     DISCHARGE INSTRUCTIONS:    Return to the emergency department if:     You vomit blood or cannot stop vomiting.      You have blood in your bowel movement or it looks like tar.       You have bleeding from your rectum.       Your abdomen is larger than usual, more painful, and hard.       You have severe pain in your abdomen.       You stop passing gas and having bowel movements.       You feel weak, dizzy, or faint.    Contact your healthcare provider if:     You have a fever.      You have new signs and symptoms.      Your symptoms do not get better with treatment.       You have questions or concerns about your condition or care.    Medicines may be given to decrease pain, treat an infection, and manage your symptoms. Take your medicine as directed. Call your healthcare provider if you think your medicine is not helping or if you have side effects. Tell him if you are allergic to any medicine. Keep a list of the medicines, vitamins, and herbs you take. Include the amounts, and when and why you take them. Bring the list or the pill bottles to follow-up visits. Carry your medicine list with you in case of an emergency.    Manage your symptoms:     Apply heat on your abdomen for 20 to 30 minutes every 2 hours for as many days as directed. Heat helps decrease pain and muscle spasms.       Manage your stress. Stress may cause abdominal pain. Your healthcare provider may recommend relaxation techniques and deep breathing exercises to help decrease your stress. Your healthcare provider may recommend you talk to someone about your stress or anxiety, such as a counselor or a trusted friend. Get plenty of sleep and exercise regularly.       Limit or do not drink alcohol. Alcohol can make your abdominal pain worse. Ask your healthcare provider if it is safe for you to drink alcohol. Also ask how much is safe for you to drink.       Do not smoke. Nicotine and other chemicals in cigarettes can damage your esophagus and stomach. Ask your healthcare provider for information if you currently smoke and need help to quit. E-cigarettes or smokeless tobacco still contain nicotine. Talk to your healthcare provider before you use these products.     Make changes to the food you eat as directed: Do not eat foods that cause abdominal pain or other symptoms. Eat small meals more often.     Eat more high-fiber foods if you are constipated. High-fiber foods include fruits, vegetables, whole-grain foods, and legumes.       Do not eat foods that cause gas if you have bloating. Examples include broccoli, cabbage, and cauliflower. Do not drink soda or carbonated drinks, because these may also cause gas.       Do not eat foods or drinks that contain sorbitol or fructose if you have diarrhea and bloating. Some examples are fruit juices, candy, jelly, and sugar-free gum.       Do not eat high-fat foods, such as fried foods, cheeseburgers, hot dogs, and desserts.      Limit or do not drink caffeine. Caffeine may make symptoms, such as heart burn or nausea, worse.       Drink plenty of liquids to prevent dehydration from diarrhea or vomiting. Ask your healthcare provider how much liquid to drink each day and which liquids are best for you.     Follow up with your healthcare provider as directed: Write down your questions so you remember to ask them during your visits.       © Copyright [employer] 2019 All illustrations and images included in CareNotes are the copyrighted property of [employer] or [employer] Please follow up with Dr. Post during next available appointment. Please return to the ED if you develop new or worsening symptoms suggestive of stent migration or worsening pancreatitis.     Acute Abdominal Pain    WHAT YOU NEED TO KNOW:    The cause of your abdominal pain may not be found. If a cause is found, treatment will depend on what the cause is.     DISCHARGE INSTRUCTIONS:    Return to the emergency department if:     You vomit blood or cannot stop vomiting.      You have blood in your bowel movement or it looks like tar.       You have bleeding from your rectum.       Your abdomen is larger than usual, more painful, and hard.       You have severe pain in your abdomen.       You stop passing gas and having bowel movements.       You feel weak, dizzy, or faint.    Contact your healthcare provider if:     You have a fever.      You have new signs and symptoms.      Your symptoms do not get better with treatment.       You have questions or concerns about your condition or care.    Medicines may be given to decrease pain, treat an infection, and manage your symptoms. Take your medicine as directed. Call your healthcare provider if you think your medicine is not helping or if you have side effects. Tell him if you are allergic to any medicine. Keep a list of the medicines, vitamins, and herbs you take. Include the amounts, and when and why you take them. Bring the list or the pill bottles to follow-up visits. Carry your medicine list with you in case of an emergency.    Manage your symptoms:     Apply heat on your abdomen for 20 to 30 minutes every 2 hours for as many days as directed. Heat helps decrease pain and muscle spasms.       Manage your stress. Stress may cause abdominal pain. Your healthcare provider may recommend relaxation techniques and deep breathing exercises to help decrease your stress. Your healthcare provider may recommend you talk to someone about your stress or anxiety, such as a counselor or a trusted friend. Get plenty of sleep and exercise regularly.       Limit or do not drink alcohol. Alcohol can make your abdominal pain worse. Ask your healthcare provider if it is safe for you to drink alcohol. Also ask how much is safe for you to drink.       Do not smoke. Nicotine and other chemicals in cigarettes can damage your esophagus and stomach. Ask your healthcare provider for information if you currently smoke and need help to quit. E-cigarettes or smokeless tobacco still contain nicotine. Talk to your healthcare provider before you use these products.     Make changes to the food you eat as directed: Do not eat foods that cause abdominal pain or other symptoms. Eat small meals more often.     Eat more high-fiber foods if you are constipated. High-fiber foods include fruits, vegetables, whole-grain foods, and legumes.       Do not eat foods that cause gas if you have bloating. Examples include broccoli, cabbage, and cauliflower. Do not drink soda or carbonated drinks, because these may also cause gas.       Do not eat foods or drinks that contain sorbitol or fructose if you have diarrhea and bloating. Some examples are fruit juices, candy, jelly, and sugar-free gum.       Do not eat high-fat foods, such as fried foods, cheeseburgers, hot dogs, and desserts.      Limit or do not drink caffeine. Caffeine may make symptoms, such as heart burn or nausea, worse.       Drink plenty of liquids to prevent dehydration from diarrhea or vomiting. Ask your healthcare provider how much liquid to drink each day and which liquids are best for you.     Follow up with your healthcare provider as directed: Write down your questions so you remember to ask them during your visits.       © Copyright [employer] 2019 All illustrations and images included in CareNotes are the copyrighted property of [employer] Inc[employer] or [employer]

## 2023-12-01 NOTE — ED PROVIDER NOTE - ATTENDING APP SHARED VISIT CONTRIBUTION OF CARE
48-year-old male with with a past medical history significant for pancreatic duct stone status post stents who presents with abdominal pain.  Patient states that approximately 2 weeks ago he was mowing the lawn when he felt pain in the epigastrium.  Patient denies any other medical complaints.    VITAL SIGNS: I have reviewed nursing notes and confirm.  CONSTITUTIONAL: non-toxic, well appearing  SKIN: no rash, no petechiae.  EYES: EOMI, pink conjunctiva, anicteric  ENT: tongue midline, no exudates, MMM  NECK: Supple; no meningismus, no JVD  CARD: RRR, no murmurs, equal radial pulses bilaterally 2+  RESP: CTAB, no respiratory distress  ABD: Soft, non-tender, non-distended, no peritoneal signs, no HSM, no CVA tenderness  EXT: Normal ROM x4. No edema. No calves tenderness  NEURO: Alert, oriented x3. CN2-12 intact, equal strength bilaterally, nl gait.  PSYCH: Cooperative, appropriate.      48-year-old male that presents with abdominal pain.  Concern for possible pancreatitis or displacement of stents.  Labs imaging pain management reassess dispo pending.  Of note patient wishes to leave AMA.

## 2024-01-05 ENCOUNTER — RESULT REVIEW (OUTPATIENT)
Age: 49
End: 2024-01-05

## 2024-01-05 ENCOUNTER — TRANSCRIPTION ENCOUNTER (OUTPATIENT)
Age: 49
End: 2024-01-05

## 2024-01-12 ENCOUNTER — OUTPATIENT (OUTPATIENT)
Dept: OUTPATIENT SERVICES | Facility: HOSPITAL | Age: 49
LOS: 1 days | End: 2024-01-12
Payer: COMMERCIAL

## 2024-01-12 ENCOUNTER — RESULT REVIEW (OUTPATIENT)
Age: 49
End: 2024-01-12

## 2024-01-12 DIAGNOSIS — R10.2 PELVIC AND PERINEAL PAIN: ICD-10-CM

## 2024-01-12 DIAGNOSIS — Z98.890 OTHER SPECIFIED POSTPROCEDURAL STATES: Chronic | ICD-10-CM

## 2024-01-12 DIAGNOSIS — R10.9 UNSPECIFIED ABDOMINAL PAIN: ICD-10-CM

## 2024-01-12 DIAGNOSIS — Z90.49 ACQUIRED ABSENCE OF OTHER SPECIFIED PARTS OF DIGESTIVE TRACT: Chronic | ICD-10-CM

## 2024-01-12 PROCEDURE — 74018 RADEX ABDOMEN 1 VIEW: CPT | Mod: 26

## 2024-01-12 PROCEDURE — 74018 RADEX ABDOMEN 1 VIEW: CPT

## 2024-01-12 PROCEDURE — 78226 HEPATOBILIARY SYSTEM IMAGING: CPT

## 2024-01-12 PROCEDURE — 78226 HEPATOBILIARY SYSTEM IMAGING: CPT | Mod: 26

## 2024-01-12 PROCEDURE — A9537: CPT

## 2024-01-13 DIAGNOSIS — R10.9 UNSPECIFIED ABDOMINAL PAIN: ICD-10-CM

## 2024-01-13 DIAGNOSIS — R10.2 PELVIC AND PERINEAL PAIN: ICD-10-CM

## 2024-01-17 ENCOUNTER — APPOINTMENT (OUTPATIENT)
Dept: GASTROENTEROLOGY | Facility: CLINIC | Age: 49
End: 2024-01-17

## 2024-01-17 VITALS — WEIGHT: 160 LBS | BODY MASS INDEX: 22.9 KG/M2 | HEIGHT: 70 IN

## 2024-01-17 DIAGNOSIS — Z78.9 OTHER SPECIFIED HEALTH STATUS: ICD-10-CM

## 2024-01-17 DIAGNOSIS — R10.9 UNSPECIFIED ABDOMINAL PAIN: ICD-10-CM

## 2024-01-17 DIAGNOSIS — F17.200 NICOTINE DEPENDENCE, UNSPECIFIED, UNCOMPLICATED: ICD-10-CM

## 2024-01-17 PROCEDURE — XXXXX: CPT | Mod: 1L

## 2024-01-17 RX ORDER — PANCRELIPASE 36000; 180000; 114000 [USP'U]/1; [USP'U]/1; [USP'U]/1
36000-114000 CAPSULE, DELAYED RELEASE PELLETS ORAL
Refills: 0 | Status: DISCONTINUED | COMMUNITY
End: 2024-01-17

## 2024-01-17 RX ORDER — IBUPROFEN 600 MG/1
600 TABLET, FILM COATED ORAL
Refills: 0 | Status: ACTIVE | COMMUNITY

## 2024-01-17 RX ORDER — PREGABALIN 75 MG/1
75 CAPSULE ORAL
Refills: 0 | Status: ACTIVE | COMMUNITY

## 2024-01-18 NOTE — PHYSICAL EXAM
[Alert] : alert [Normal Voice/Communication] : normal voice/communication [No Acute Distress] : no acute distress [Well Developed] : well developed [Well Nourished] : well nourished [Sclera] : the sclera and conjunctiva were normal [Hearing Threshold Finger Rub Not [place]] : hearing was normal [Normal Lips/Gums] : the lips and gums were normal [Oropharynx] : the oropharynx was normal [Normal Appearance] : the appearance of the neck was normal [No Neck Mass] : no neck mass was observed [No Respiratory Distress] : no respiratory distress [No Acc Muscle Use] : no accessory muscle use [Respiration, Rhythm And Depth] : normal respiratory rhythm and effort [Auscultation Breath Sounds / Voice Sounds] : lungs were clear to auscultation bilaterally [Heart Rate And Rhythm] : heart rate was normal and rhythm regular [Normal S1, S2] : normal S1 and S2 [Murmurs] : no murmurs [None] : no edema [Bowel Sounds] : normal bowel sounds [Abdomen Tenderness] : non-tender [No Masses] : no abdominal mass palpated [Abdomen Soft] : soft [] : no hepatosplenomegaly [Abnormal Walk] : normal gait [Normal Color / Pigmentation] : normal skin color and pigmentation [Oriented To Time, Place, And Person] : oriented to person, place, and time

## 2024-01-18 NOTE — HISTORY OF PRESENT ILLNESS
[FreeTextEntry1] : Patient is a 48-year-old male who initially came in for a dilated common bile duct and pancreatic duct on imaging, associated with symptomatic abdominal pain, postprandial pain and some weight loss, extensive work-up including ERCP and endoscopic ultrasound revealed a calcified impacted pancreatic duct stone at the pancreatic general/head, the stone is located right at the junction of the pancreatic neck/head. Patient had subsequent multiple ERCPs with bilious enterotomy, pancreatic sphincterotomy and pancreatic stent placement. Pancreatic stent placement usually alleviates his pain. He had multiple ERCPs with stent exchanges to obtain ideal stent diameter and stent length as those pancreatic stents frequently occluded. During 1 ERCP the stent had migrated almost out of the pancreatic duct. He also had brushing at the pancreatic duct that was revealing for possible atypia with some low-grade dysplasia. No evidence of adenocarcinoma. Endoscopic work-up with endoscopic ultrasound as well as MRI and CAT scan revealed no pancreatic masses or signs of adenocarcinoma. Last ERCP patient had 2 [id number] pancreatic stent placement to achieve better drainage of the pancreatic duct. He went for second opinion at [facility] [place], and case discussed with the advanced endoscopist, the overall agreed with our management, only solution for permanent relief of his chronic pancreatitis will be a Whipple procedure. At this time he feels better. Continues to have some dull aching pain post stent placement, possibly he had postprocedural pain, spoke to patient and wife at length postprocedure, otherwise he is doing well no fever, no significant abdominal pain, no signs of perforation, no other postprocedure complication. Able to tolerate more diet, felt very well after stent placement. He is S/P HIDA scan and for F/U today. He continues to have severe abdominal pain, worse after eating, RUQ radiating to the LLQ. HIDA scan did not show any intestinal activity so CCK did not go through. He denied vomiting, diarrhea, melena, or blood in the stools. He also C/O constipation.   [de-identified] : 1/12/24

## 2024-01-18 NOTE — ASSESSMENT
[FreeTextEntry1] : Patient is a 48-year-old male who initially came in for a dilated common bile duct and pancreatic duct on imaging, associated with symptomatic abdominal pain, postprandial pain and some weight loss, extensive work-up including ERCP and endoscopic ultrasound revealed a calcified impacted pancreatic duct stone at the pancreatic general/head, the stone is located right at the junction of the pancreatic neck/head. Patient had subsequent multiple ERCPs with bilious enterotomy, pancreatic sphincterotomy and pancreatic stent placement. Pancreatic stent placement usually alleviates his pain. He had multiple ERCPs with stent exchanges to obtain ideal stent diameter and stent length as those pancreatic stents frequently occluded. During 1 ERCP the stent had migrated almost out of the pancreatic duct. He also had brushing at the pancreatic duct that was revealing for possible atypia with some low-grade dysplasia. No evidence of adenocarcinoma. Endoscopic work-up with endoscopic ultrasound as well as MRI and CAT scan revealed no pancreatic masses or signs of adenocarcinoma. Last ERCP patient had 2 [id number] pancreatic stent placement to achieve better drainage of the pancreatic duct. He went for second opinion at [facility] [place], and case discussed with the advanced endoscopist, the overall agreed with our management, only solution for permanent relief of his chronic pancreatitis will be a Whipple procedure. At this time he feels better. Continues to have some dull aching pain post stent placement, possibly he had postprocedural pain, spoke to patient and wife at length postprocedure, otherwise he is doing well no fever, no significant abdominal pain, no signs of perforation, no other postprocedure complication. Able to tolerate more diet, felt very well after stent placement. He is S/P HIDA scan and for F/U today. He continues to have severe abdominal pain, worse after eating, RUQ radiating to the LLQ. HIDA scan did not show any intestinal activity so CCK did not go through. He denied vomiting, diarrhea, melena, or blood in the stools. He also C/O constipation.     Chronic Pancreatitis with PD stones with chronic abdominal pain Enlarged GB with slow motility  -Will schedule an ERCP with stent exchange in the PD and stent placement in the CBD; risks and benefits discussed -Discussed shock treatment of the PD stones and may arrange that in the future in [place] -Will increase Lyrica dose to 150 mg per day -Advised that patient increase his caloric intake to gain weight; boost supplements, increase sugar with ice cream, honey, etc -Surgical options discussed ([place] and Whipple) and it was recommended that he see Surgeons (Dr. Dowell, Dr. Ansari, Surgeon at [facility] [place])  -Also discussed possible doing a celiac plexus block to better manage the pain

## 2024-01-18 NOTE — REASON FOR VISIT
[Follow-up] : a follow-up of an existing diagnosis [Spouse] : spouse [FreeTextEntry1] : HIDA Scan Results

## 2024-01-23 ENCOUNTER — TRANSCRIPTION ENCOUNTER (OUTPATIENT)
Age: 49
End: 2024-01-23

## 2024-01-28 ENCOUNTER — RESULT REVIEW (OUTPATIENT)
Age: 49
End: 2024-01-28

## 2024-01-29 ENCOUNTER — RESULT REVIEW (OUTPATIENT)
Age: 49
End: 2024-01-29

## 2024-01-29 ENCOUNTER — TRANSCRIPTION ENCOUNTER (OUTPATIENT)
Age: 49
End: 2024-01-29

## 2024-01-29 ENCOUNTER — OUTPATIENT (OUTPATIENT)
Dept: OUTPATIENT SERVICES | Facility: HOSPITAL | Age: 49
LOS: 1 days | Discharge: HOME | End: 2024-01-29
Payer: COMMERCIAL

## 2024-01-29 VITALS
SYSTOLIC BLOOD PRESSURE: 131 MMHG | HEIGHT: 70 IN | RESPIRATION RATE: 18 BRPM | DIASTOLIC BLOOD PRESSURE: 80 MMHG | HEART RATE: 68 BPM | TEMPERATURE: 98 F | WEIGHT: 158.07 LBS

## 2024-01-29 VITALS
DIASTOLIC BLOOD PRESSURE: 97 MMHG | RESPIRATION RATE: 18 BRPM | SYSTOLIC BLOOD PRESSURE: 147 MMHG | OXYGEN SATURATION: 97 % | HEART RATE: 72 BPM

## 2024-01-29 DIAGNOSIS — K86.1 OTHER CHRONIC PANCREATITIS: ICD-10-CM

## 2024-01-29 DIAGNOSIS — R10.9 UNSPECIFIED ABDOMINAL PAIN: ICD-10-CM

## 2024-01-29 DIAGNOSIS — Z90.49 ACQUIRED ABSENCE OF OTHER SPECIFIED PARTS OF DIGESTIVE TRACT: Chronic | ICD-10-CM

## 2024-01-29 DIAGNOSIS — Z98.890 OTHER SPECIFIED POSTPROCEDURAL STATES: Chronic | ICD-10-CM

## 2024-01-29 PROCEDURE — 74018 RADEX ABDOMEN 1 VIEW: CPT

## 2024-01-29 PROCEDURE — C2625: CPT

## 2024-01-29 PROCEDURE — 88300 SURGICAL PATH GROSS: CPT

## 2024-01-29 PROCEDURE — 43274 ERCP DUCT STENT PLACEMENT: CPT | Mod: XU

## 2024-01-29 PROCEDURE — C2617: CPT

## 2024-01-29 PROCEDURE — 88112 CYTOPATH CELL ENHANCE TECH: CPT

## 2024-01-29 PROCEDURE — C1769: CPT

## 2024-01-29 PROCEDURE — 88112 CYTOPATH CELL ENHANCE TECH: CPT | Mod: 26

## 2024-01-29 PROCEDURE — 43264 ERCP REMOVE DUCT CALCULI: CPT | Mod: XU

## 2024-01-29 PROCEDURE — 74330 X-RAY BILE/PANC ENDOSCOPY: CPT | Mod: 26

## 2024-01-29 PROCEDURE — 88300 SURGICAL PATH GROSS: CPT | Mod: 26

## 2024-01-29 PROCEDURE — C9399: CPT

## 2024-01-29 PROCEDURE — 43276 ERCP STENT EXCHANGE W/DILATE: CPT

## 2024-01-29 PROCEDURE — C1889: CPT

## 2024-01-29 RX ORDER — ONDANSETRON 8 MG/1
4 TABLET, FILM COATED ORAL ONCE
Refills: 0 | Status: DISCONTINUED | OUTPATIENT
Start: 2024-01-29 | End: 2024-01-29

## 2024-01-29 RX ORDER — SODIUM CHLORIDE 9 MG/ML
500 INJECTION INTRAMUSCULAR; INTRAVENOUS; SUBCUTANEOUS
Refills: 0 | Status: DISCONTINUED | OUTPATIENT
Start: 2024-01-29 | End: 2024-01-29

## 2024-01-29 RX ORDER — HYDROMORPHONE HYDROCHLORIDE 2 MG/ML
0.5 INJECTION INTRAMUSCULAR; INTRAVENOUS; SUBCUTANEOUS
Refills: 0 | Status: DISCONTINUED | OUTPATIENT
Start: 2024-01-29 | End: 2024-01-29

## 2024-01-29 RX ORDER — INDOMETHACIN 50 MG
100 CAPSULE ORAL ONCE
Refills: 0 | Status: COMPLETED | OUTPATIENT
Start: 2024-01-29 | End: 2024-01-29

## 2024-01-29 RX ORDER — IBUPROFEN 200 MG
1 TABLET ORAL
Refills: 0 | DISCHARGE

## 2024-01-29 RX ORDER — HYDRALAZINE HCL 50 MG
10 TABLET ORAL ONCE
Refills: 0 | Status: COMPLETED | OUTPATIENT
Start: 2024-01-29 | End: 2024-01-29

## 2024-01-29 RX ORDER — HYDROMORPHONE HYDROCHLORIDE 2 MG/ML
1 INJECTION INTRAMUSCULAR; INTRAVENOUS; SUBCUTANEOUS
Refills: 0 | Status: DISCONTINUED | OUTPATIENT
Start: 2024-01-29 | End: 2024-01-29

## 2024-01-29 RX ADMIN — Medication 10 MILLIGRAM(S): at 18:16

## 2024-01-29 RX ADMIN — HYDROMORPHONE HYDROCHLORIDE 0.5 MILLIGRAM(S): 2 INJECTION INTRAMUSCULAR; INTRAVENOUS; SUBCUTANEOUS at 18:47

## 2024-01-29 RX ADMIN — Medication 100 MILLIGRAM(S): at 15:17

## 2024-01-29 RX ADMIN — HYDROMORPHONE HYDROCHLORIDE 0.5 MILLIGRAM(S): 2 INJECTION INTRAMUSCULAR; INTRAVENOUS; SUBCUTANEOUS at 19:08

## 2024-01-29 NOTE — ASU PATIENT PROFILE, ADULT - FALL HARM RISK - UNIVERSAL INTERVENTIONS
Bed in lowest position, wheels locked, appropriate side rails in place/Call bell, personal items and telephone in reach/Instruct patient to call for assistance before getting out of bed or chair/Non-slip footwear when patient is out of bed/[place] to call system/Physically safe environment - no spills, clutter or unnecessary equipment/Purposeful Proactive Rounding/Room/bathroom lighting operational, light cord in reach

## 2024-01-29 NOTE — ASU PATIENT PROFILE, ADULT - NSICDXPASTMEDICALHX_GEN_ALL_CORE_FT
PAST MEDICAL HISTORY:  Back pain buldging and herniated discs    Bulging lumbar disc     Lumbar herniated disc     Pancreatitis

## 2024-01-29 NOTE — ASU PATIENT PROFILE, ADULT - PRESSURE ULCER(S)
carvedilol (COREG) 12.5 MG tablet 180 tablet 1 9/28/2018     omeprazole (PRILOSEC) 40 MG capsule 90 capsule 1 9/28/2018     simvastatin (ZOCOR) 20 MG tablet 90 tablet 1 9/28/2018     repaglinide (PRANDIN) 2 MG tablet 360 tablet 1 9/28/2018     medroxyPROGESTERone (PROVERA) 2.5 MG tablet 90 tablet 1 9/28/2018     estradiol (ESTRACE) 0.5 MG tablet 90 tablet 1 9/28/2018     irbesartan (AVAPRO) 300 MG tablet 90 tablet 1 9/28/2018     Last seen by PCP 9/28/2018, pt was advised to return in 6 months, next appt scheduled 4/12/2019    Pt will need med refills prior to being seen next, however its too soon to send refills. Not due until late March 2019. Requests denied at this time.       no

## 2024-01-31 LAB — NON-GYNECOLOGICAL CYTOLOGY STUDY: SIGNIFICANT CHANGE UP

## 2024-02-02 LAB — SURGICAL PATHOLOGY STUDY: SIGNIFICANT CHANGE UP

## 2024-02-06 DIAGNOSIS — K86.89 OTHER SPECIFIED DISEASES OF PANCREAS: ICD-10-CM

## 2024-02-06 DIAGNOSIS — K86.1 OTHER CHRONIC PANCREATITIS: ICD-10-CM

## 2024-02-07 ENCOUNTER — APPOINTMENT (OUTPATIENT)
Dept: GASTROENTEROLOGY | Facility: CLINIC | Age: 49
End: 2024-02-07

## 2024-02-07 PROCEDURE — XXXXX: CPT | Mod: 1L

## 2024-02-07 NOTE — ASSESSMENT
[FreeTextEntry1] : Patient is a 48-year-old male who initially came in for a dilated common bile duct and pancreatic duct on imaging, associated with symptomatic abdominal pain, postprandial pain and some weight loss for f/U today post repeat ERCP on 1/29/24.ERCP revealed chronic pancreatitis with dilated PD. Prior PD stents were removed, CBD was cannulated, cholangiogram obtained and showed no stricture in the CBD. a plastic CBD stent was placed with excellent drainage with removal of sludge from the CBD. Multiple PD stents were placed  facilitate future PD stone lithotripsy Brushings showed benign ductal and inflammatory cells.. He reported RUQ pain 3 days postprocedure that he stated has been a pressure type pain, different from previously, but improving and now down to a 2/10 in intensity. He has been eating better and is up to 161 lbs. He has been taking 75 mg Lyrica and he has been sleeping better but will start the 150 mg dose when he runs out of the 75 mg. He denied fevers, vomiting, or jaundice. He has an app[place] [facility] [place].    Chronic Pancreatitis with PD stones with chronic abdominal pain Enlarged GB with slow motility -S/P ERCP with stent exchange in the PD and stent placement in the CBD; cytology was negative for malignancy and results  were  discussed with patient today -Discussed shock treatment of the PD stones and may arrange that in the future in [place] -Continue Lyrica 150 mg per day -Advised that patient increase his caloric intake to gain weight; boost supplements, increase sugar with ice cream, honey, etc -F/U [place] [facility] [place] -He requested a copy of the procedure report with the pathology results be sent to him -Also discussed possible doing a celiac plexus block to better manage the pain in the future if indicated -Patient told to do labs that were ordered on 1/17/24

## 2024-02-07 NOTE — REVIEW OF SYSTEMS
[Recent Weight Gain (___ Lbs)] : recent [unfilled] ~Ulb weight gain [As Noted in HPI] : as noted in HPI [Abdominal Pain] : abdominal pain [Negative] : Heme/Lymph [Fever] : no fever [Chills] : no chills [Feeling Poorly] : not feeling poorly [Feeling Tired] : not feeling tired [Recent Weight Loss (___ Lbs)] : no recent weight loss [Vomiting] : no vomiting [Constipation] : no constipation [Diarrhea] : no diarrhea [Heartburn] : no heartburn [Melena (black stool)] : no melena [Bleeding] : no bleeding [Fecal Incontinence (soiling)] : no fecal incontinence [Bloating (gassiness)] : no bloating

## 2024-02-07 NOTE — HISTORY OF PRESENT ILLNESS
[Home] : at home, [unfilled] , at the time of the visit. [Medical Office: ([facility])___] : at the medical office located in  [Verbal consent obtained from patient] : the patient, [unfilled] [FreeTextEntry4] : Sera [FreeTextEntry1] : Patient is a 48-year-old male who initially came in for a dilated common bile duct and pancreatic duct on imaging, associated with symptomatic abdominal pain, postprandial pain and some weight loss for f/U today post repeat ERCP on 1/29/24.ERCP revealed chronic pancreatitis with dilated PD. Prior PD stents were removed, CBD was cannulated, cholangiogram obtained and showed no stricture in the CBD. a plastic CBD stent was placed with excellent drainage with removal of sludge from the CBD. Multiple PD stents were placed  facilitate future PD stone lithotripsy. He reported RUQ pain 3 days postprocedure that he stated has been a pressure type pain, different from previously, but improving and now down to a 2/10 in intensity. He has been eating better and is up to 161 lbs. He has been taking 75 mg Lyrica and he has been sleeping better but will start the 150 mg dose when he runs out of the 75 mg. He denied fevers, vomiting, or jaundice. He [place] [facility] [place].     Extensive work-up in the past including ERCP and endoscopic ultrasound revealed a calcified impacted pancreatic duct stone at the pancreatic general/head, the stone is located right at the junction of the pancreatic neck/head. Patient had subsequent multiple ERCPs with bilious enterotomy, pancreatic sphincterotomy and pancreatic stent placement. Pancreatic stent placement usually alleviates his pain. He had multiple ERCPs with stent exchanges to obtain ideal stent diameter and stent length as those pancreatic stents frequently occluded. During 1 ERCP the stent had migrated almost out of the pancreatic duct. He also had brushing at the pancreatic duct that was revealing for possible atypia with some low-grade dysplasia. No evidence of adenocarcinoma. Endoscopic work-up with endoscopic ultrasound as well as MRI and CAT scan revealed no pancreatic masses or signs of adenocarcinoma.    [de-identified] : 1/29/24

## 2024-02-08 ENCOUNTER — TRANSCRIPTION ENCOUNTER (OUTPATIENT)
Age: 49
End: 2024-02-08

## 2024-02-12 ENCOUNTER — TRANSCRIPTION ENCOUNTER (OUTPATIENT)
Age: 49
End: 2024-02-12

## 2024-02-22 NOTE — ASSESSMENT
[FreeTextEntry1] : Patient is a 48-year-old male who initially came in for a dilated common bile duct and pancreatic duct on imaging, associated with symptomatic abdominal pain, postprandial pain and some weight loss, extensive work-up including ERCP and endoscopic ultrasound revealed a calcified impacted pancreatic duct stone at the pancreatic general/head, the stone is located right at the junction of the pancreatic neck/head.  Patient had subsequent multiple ERCPs with bilious enterotomy, pancreatic sphincterotomy and pancreatic stent placement.  Pancreatic stent placement usually alleviates his pain.  He had multiple ERCPs with stent exchanges to obtain ideal stent diameter and stent length as those pancreatic stents frequently occluded.  During 1 ERCP the stent had migrated almost out of the pancreatic duct.  He also had brushing at the pancreatic duct that was revealing for possible atypia with some low-grade dysplasia.  No evidence of adenocarcinoma.  Endoscopic work-up with endoscopic ultrasound as well as MRI and CAT scan revealed no pancreatic masses or signs of adenocarcinoma.  Last ERCP patient had 2 [id number] pancreatic stent placement to achieve better drainage of the pancreatic duct.  He went for second opinion at [facility] [place], and case discussed with the advanced endoscopist, the overall agreed with our management, only solution for permanent relief of his chronic pancreatitis will be a Whipple procedure.  At this time he feels better.  Continues to have some dull aching pain post stent placement, possibly he had postprocedural pain, spoke to patient and wife at length postprocedure, otherwise he is doing well no fever, no significant abdominal pain, no signs of perforation, no other postprocedure complication.  Able to tolerate more diet, felt very well after stent placement.  Plan : Continue low-fat diet. Continue pancreatic enzyme supplements Check KUB for position of stent placed rule out migration Follow-up with the pancreas program, regarding surgical option including post to surgery and or resection May consider repeat ERCP with spyglass and EHL as currently there are 2 stents within the PD and it may be easier to pass the spyglass pancreatoscopy and fragment the stone which prior might of caused significant pancreatitis, patient and wife are aware of the possible risk of worsening pancreatitis with the spyglass

## 2024-02-22 NOTE — HISTORY OF PRESENT ILLNESS
[FreeTextEntry1] : Patient is a 48-year-old male who initially came in for a dilated common bile duct and pancreatic duct on imaging, associated with symptomatic abdominal pain, postprandial pain and some weight loss, extensive work-up including ERCP and endoscopic ultrasound revealed a calcified impacted pancreatic duct stone at the pancreatic general/head, the stone is located right at the junction of the pancreatic neck/head.  Patient had subsequent multiple ERCPs with bilious enterotomy, pancreatic sphincterotomy and pancreatic stent placement.  Pancreatic stent placement usually alleviates his pain.  He had multiple ERCPs with stent exchanges to obtain ideal stent diameter and stent length as those pancreatic stents frequently occluded.  During 1 ERCP the stent had migrated almost out of the pancreatic duct.  He also had brushing at the pancreatic duct that was revealing for possible atypia with some low-grade dysplasia.  No evidence of adenocarcinoma.  Endoscopic work-up with endoscopic ultrasound as well as MRI and CAT scan revealed no pancreatic masses or signs of adenocarcinoma.  Last ERCP patient had 2 [id number] pancreatic stent placement to achieve better drainage of the pancreatic duct.  He went for second opinion at [facility] [place], and case discussed with the advanced endoscopist, the overall agreed with our management, only solution for permanent relief of his chronic pancreatitis will be a Whipple procedure.  At this time he feels better.  Continues to have some dull aching pain post stent placement, possibly he had postprocedural pain, spoke to patient and wife at length postprocedure, otherwise he is doing well no fever, no significant abdominal pain, no signs of perforation, no other postprocedure complication.  Able to tolerate more diet, felt very well after stent placement.

## 2024-02-29 ENCOUNTER — TRANSCRIPTION ENCOUNTER (OUTPATIENT)
Age: 49
End: 2024-02-29

## 2024-03-08 ENCOUNTER — TRANSCRIPTION ENCOUNTER (OUTPATIENT)
Age: 49
End: 2024-03-08

## 2024-03-11 ENCOUNTER — TRANSCRIPTION ENCOUNTER (OUTPATIENT)
Age: 49
End: 2024-03-11

## 2024-03-11 PROBLEM — M51.36 OTHER INTERVERTEBRAL DISC DEGENERATION, LUMBAR REGION: Chronic | Status: ACTIVE | Noted: 2024-01-29

## 2024-03-11 PROBLEM — M51.26 OTHER INTERVERTEBRAL DISC DISPLACEMENT, LUMBAR REGION: Chronic | Status: ACTIVE | Noted: 2024-01-29

## 2024-03-27 ENCOUNTER — TRANSCRIPTION ENCOUNTER (OUTPATIENT)
Age: 49
End: 2024-03-27

## 2024-04-01 ENCOUNTER — TRANSCRIPTION ENCOUNTER (OUTPATIENT)
Age: 49
End: 2024-04-01

## 2024-04-02 ENCOUNTER — TRANSCRIPTION ENCOUNTER (OUTPATIENT)
Age: 49
End: 2024-04-02

## 2024-04-04 ENCOUNTER — NON-APPOINTMENT (OUTPATIENT)
Age: 49
End: 2024-04-04

## 2024-04-04 ENCOUNTER — APPOINTMENT (OUTPATIENT)
Dept: SURGERY | Facility: CLINIC | Age: 49
End: 2024-04-04
Payer: COMMERCIAL

## 2024-04-04 VITALS
BODY MASS INDEX: 21.76 KG/M2 | OXYGEN SATURATION: 98 % | HEART RATE: 85 BPM | WEIGHT: 152 LBS | HEIGHT: 70 IN | DIASTOLIC BLOOD PRESSURE: 82 MMHG | SYSTOLIC BLOOD PRESSURE: 144 MMHG | TEMPERATURE: 97 F

## 2024-04-04 PROCEDURE — 99215 OFFICE O/P EST HI 40 MIN: CPT

## 2024-04-08 ENCOUNTER — NON-APPOINTMENT (OUTPATIENT)
Age: 49
End: 2024-04-08

## 2024-04-08 LAB
ALBUMIN SERPL ELPH-MCNC: 5.2 G/DL
ALP BLD-CCNC: 96 U/L
ALT SERPL-CCNC: 16 U/L
ANION GAP SERPL CALC-SCNC: 15 MMOL/L
AST SERPL-CCNC: 18 U/L
BILIRUB SERPL-MCNC: 0.3 MG/DL
BUN SERPL-MCNC: 9 MG/DL
CALCIUM SERPL-MCNC: 10.7 MG/DL
CHLORIDE SERPL-SCNC: 101 MMOL/L
CO2 SERPL-SCNC: 26 MMOL/L
CREAT SERPL-MCNC: 1 MG/DL
EGFR: 91 ML/MIN/1.73M2
GLUCOSE SERPL-MCNC: 144 MG/DL
POTASSIUM SERPL-SCNC: 5.3 MMOL/L
PROT SERPL-MCNC: 8.4 G/DL
SODIUM SERPL-SCNC: 142 MMOL/L

## 2024-04-08 NOTE — HISTORY OF PRESENT ILLNESS
[de-identified] : AMY CARRILLO  is a pleasant 48 year year old man  who came in 2023 for pancreatic duct dilatation . 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 for severe pain with ERCP done with change of his PD on may , he feels better now, brushing during ERCP showed atypical cells, 2023-> MRI showed enlarged head of pancreas suggestive of mild localized pancreatitis \par  \par    \par  \par

## 2024-04-08 NOTE — ASSESSMENT
[FreeTextEntry1] : AMY CARRILLO  is a pleasant 48 year year old man  who came in 2023 for pancreatic duct dilatation . He has Dr MIRA ETIENNE as His PCP.  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, smoking > 30 years i pack/day,  family hx: father  at 80s with CA, mother  at 40s from brain tumor and she had diabetes,   CT chest 2023: done for smoking history showed calcifications in pancreas and dilated PD to 10mm,  MRI 2023: 8MM PD 3/9/2023: EUS DILATED pd 5-7MM, HYPERECHOIC STRUCTURE 9MM UNCINATE, WITHIN MAIN PD SUGGESTIVE OF STONE He had pancreatic stent placed on  with resolution of his pain but possible returning back recently  2023: shared concern of diffuse dilated PD, possible stone vs neoplastic disease, sending for ca 19-9, new CTs, will discuss at GI tumor board and see him in couple weeks  : he was recently admitted to [facility] for severe pain with ERCP done with change of his PD on may , he feels better now, brushing during ERCP showed atypical cells, 2023-> MRI showed enlarged head of pancreas suggestive of mild localized pancreatitis  i discussed in details the potential of stone vs neoplastic process in head of pancreas, will need to discuss at GI tumor board and patient and his friend they have my cell to contact me in couple weeks to get the final decision, we discussed stone removal by various endoscopic technique and will discuss this with dr Resendiz,he has coming CT in early 2024: he had repeated Endoscopy and stenting both biliary and pancreatic ducts, he expereinced worsening o fhis pain we rediscussed whipple surgery as options for his recurrent pancreatitis with severe calcifications/stones at head of pancreas, will repeat CT CAP, discuss with dr Resendiz and at GI tumor board  the above plan of care with discussed in details to the patient and all questions were answered to patient satisfaction. patient instructed to follow up with the referring physician and patient primary care provider  A total of 45 minutes was spent on this visit, obtaining h/p,  reviewing previous notes/imaging/scopes, counseling the patient on possible etiology and procedure , ordering tests (above), and documenting the findings in the note.

## 2024-04-09 ENCOUNTER — OUTPATIENT (OUTPATIENT)
Dept: OUTPATIENT SERVICES | Facility: HOSPITAL | Age: 49
LOS: 1 days | End: 2024-04-09
Payer: COMMERCIAL

## 2024-04-09 ENCOUNTER — APPOINTMENT (OUTPATIENT)
Dept: SURGERY | Facility: CLINIC | Age: 49
End: 2024-04-09
Payer: COMMERCIAL

## 2024-04-09 VITALS
OXYGEN SATURATION: 97 % | DIASTOLIC BLOOD PRESSURE: 80 MMHG | WEIGHT: 153 LBS | BODY MASS INDEX: 21.9 KG/M2 | HEIGHT: 70 IN | HEART RATE: 89 BPM | TEMPERATURE: 97 F | SYSTOLIC BLOOD PRESSURE: 142 MMHG

## 2024-04-09 DIAGNOSIS — K86.1 OTHER CHRONIC PANCREATITIS: ICD-10-CM

## 2024-04-09 DIAGNOSIS — Z90.49 ACQUIRED ABSENCE OF OTHER SPECIFIED PARTS OF DIGESTIVE TRACT: Chronic | ICD-10-CM

## 2024-04-09 DIAGNOSIS — K86.89 OTHER SPECIFIED DISEASES OF PANCREAS: ICD-10-CM

## 2024-04-09 DIAGNOSIS — D49.0 NEOPLASM OF UNSPECIFIED BEHAVIOR OF DIGESTIVE SYSTEM: ICD-10-CM

## 2024-04-09 DIAGNOSIS — Z00.8 ENCOUNTER FOR OTHER GENERAL EXAMINATION: ICD-10-CM

## 2024-04-09 LAB
ALBUMIN SERPL ELPH-MCNC: 4.7 G/DL
ALP BLD-CCNC: 87 U/L
ALT SERPL-CCNC: 15 U/L
AMYLASE/CREAT SERPL: 72 U/L
ANION GAP SERPL CALC-SCNC: 13 MMOL/L
AST SERPL-CCNC: 18 U/L
BASOPHILS # BLD AUTO: 0.11 K/UL
BASOPHILS NFR BLD AUTO: 1.1 %
BILIRUB SERPL-MCNC: 0.4 MG/DL
BUN SERPL-MCNC: 16 MG/DL
CALCIUM SERPL-MCNC: 10 MG/DL
CANCER AG19-9 SERPL-ACNC: 10 U/ML
CEA SERPL-MCNC: 6.5 NG/ML
CHLORIDE SERPL-SCNC: 100 MMOL/L
CO2 SERPL-SCNC: 24 MMOL/L
CREAT SERPL-MCNC: 0.9 MG/DL
EGFR: 103 ML/MIN/1.73M2
EOSINOPHIL # BLD AUTO: 0.08 K/UL
EOSINOPHIL NFR BLD AUTO: 0.8 %
GLUCOSE SERPL-MCNC: 129 MG/DL
HCT VFR BLD CALC: 43.5 %
HGB BLD-MCNC: 14 G/DL
IMM GRANULOCYTES NFR BLD AUTO: 0.3 %
LPL SERPL-CCNC: 41 U/L
LYMPHOCYTES # BLD AUTO: 2.85 K/UL
LYMPHOCYTES NFR BLD AUTO: 28.6 %
MAN DIFF?: NORMAL
MCHC RBC-ENTMCNC: 29.3 PG
MCHC RBC-ENTMCNC: 32.2 G/DL
MCV RBC AUTO: 91 FL
MONOCYTES # BLD AUTO: 0.81 K/UL
MONOCYTES NFR BLD AUTO: 8.1 %
NEUTROPHILS # BLD AUTO: 6.08 K/UL
NEUTROPHILS NFR BLD AUTO: 61.1 %
PLATELET # BLD AUTO: 348 K/UL
PMV BLD AUTO: 0 /100 WBCS
POTASSIUM SERPL-SCNC: 5 MMOL/L
PROT SERPL-MCNC: 7.5 G/DL
RBC # BLD: 4.78 M/UL
RBC # FLD: 15.3 %
SODIUM SERPL-SCNC: 137 MMOL/L
WBC # FLD AUTO: 9.96 K/UL

## 2024-04-09 PROCEDURE — 74177 CT ABD & PELVIS W/CONTRAST: CPT

## 2024-04-09 PROCEDURE — 71260 CT THORAX DX C+: CPT | Mod: 26

## 2024-04-09 PROCEDURE — 74177 CT ABD & PELVIS W/CONTRAST: CPT | Mod: 26

## 2024-04-09 PROCEDURE — 99214 OFFICE O/P EST MOD 30 MIN: CPT

## 2024-04-09 PROCEDURE — 71260 CT THORAX DX C+: CPT

## 2024-04-10 DIAGNOSIS — K86.1 OTHER CHRONIC PANCREATITIS: ICD-10-CM

## 2024-04-10 DIAGNOSIS — D49.0 NEOPLASM OF UNSPECIFIED BEHAVIOR OF DIGESTIVE SYSTEM: ICD-10-CM

## 2024-04-10 DIAGNOSIS — K86.89 OTHER SPECIFIED DISEASES OF PANCREAS: ICD-10-CM

## 2024-04-12 ENCOUNTER — RESULT REVIEW (OUTPATIENT)
Age: 49
End: 2024-04-12

## 2024-04-13 ENCOUNTER — INPATIENT (INPATIENT)
Facility: HOSPITAL | Age: 49
LOS: 2 days | Discharge: HOME | DRG: 440 | End: 2024-04-16
Attending: HOSPITALIST | Admitting: STUDENT IN AN ORGANIZED HEALTH CARE EDUCATION/TRAINING PROGRAM
Payer: COMMERCIAL

## 2024-04-13 VITALS
WEIGHT: 154.98 LBS | DIASTOLIC BLOOD PRESSURE: 95 MMHG | OXYGEN SATURATION: 97 % | HEART RATE: 73 BPM | RESPIRATION RATE: 18 BRPM | TEMPERATURE: 98 F | SYSTOLIC BLOOD PRESSURE: 168 MMHG

## 2024-04-13 DIAGNOSIS — R10.9 UNSPECIFIED ABDOMINAL PAIN: ICD-10-CM

## 2024-04-13 DIAGNOSIS — Z98.890 OTHER SPECIFIED POSTPROCEDURAL STATES: Chronic | ICD-10-CM

## 2024-04-13 DIAGNOSIS — Z90.49 ACQUIRED ABSENCE OF OTHER SPECIFIED PARTS OF DIGESTIVE TRACT: Chronic | ICD-10-CM

## 2024-04-13 LAB
ALBUMIN SERPL ELPH-MCNC: 4.5 G/DL — SIGNIFICANT CHANGE UP (ref 3.5–5.2)
ALP SERPL-CCNC: 87 U/L — SIGNIFICANT CHANGE UP (ref 30–115)
ALT FLD-CCNC: 15 U/L — SIGNIFICANT CHANGE UP (ref 0–41)
ANION GAP SERPL CALC-SCNC: 13 MMOL/L — SIGNIFICANT CHANGE UP (ref 7–14)
AST SERPL-CCNC: 31 U/L — SIGNIFICANT CHANGE UP (ref 0–41)
BASOPHILS # BLD AUTO: 0.09 K/UL — SIGNIFICANT CHANGE UP (ref 0–0.2)
BASOPHILS NFR BLD AUTO: 0.9 % — SIGNIFICANT CHANGE UP (ref 0–1)
BILIRUB DIRECT SERPL-MCNC: <0.2 MG/DL — SIGNIFICANT CHANGE UP (ref 0–0.3)
BILIRUB INDIRECT FLD-MCNC: SIGNIFICANT CHANGE UP MG/DL (ref 0.2–1.2)
BILIRUB SERPL-MCNC: <0.2 MG/DL — SIGNIFICANT CHANGE UP (ref 0.2–1.2)
BUN SERPL-MCNC: 14 MG/DL — SIGNIFICANT CHANGE UP (ref 10–20)
CALCIUM SERPL-MCNC: 9.5 MG/DL — SIGNIFICANT CHANGE UP (ref 8.4–10.5)
CHLORIDE SERPL-SCNC: 104 MMOL/L — SIGNIFICANT CHANGE UP (ref 98–110)
CO2 SERPL-SCNC: 26 MMOL/L — SIGNIFICANT CHANGE UP (ref 17–32)
CREAT SERPL-MCNC: 1.1 MG/DL — SIGNIFICANT CHANGE UP (ref 0.7–1.5)
EGFR: 81 ML/MIN/1.73M2 — SIGNIFICANT CHANGE UP
EOSINOPHIL # BLD AUTO: 0.26 K/UL — SIGNIFICANT CHANGE UP (ref 0–0.7)
EOSINOPHIL NFR BLD AUTO: 2.5 % — SIGNIFICANT CHANGE UP (ref 0–8)
GLUCOSE SERPL-MCNC: 155 MG/DL — HIGH (ref 70–99)
HCT VFR BLD CALC: 40.3 % — LOW (ref 42–52)
HGB BLD-MCNC: 13.5 G/DL — LOW (ref 14–18)
IMM GRANULOCYTES NFR BLD AUTO: 0.2 % — SIGNIFICANT CHANGE UP (ref 0.1–0.3)
LACTATE SERPL-SCNC: 1.2 MMOL/L — SIGNIFICANT CHANGE UP (ref 0.7–2)
LIDOCAIN IGE QN: 63 U/L — HIGH (ref 7–60)
LYMPHOCYTES # BLD AUTO: 3.16 K/UL — SIGNIFICANT CHANGE UP (ref 1.2–3.4)
LYMPHOCYTES # BLD AUTO: 30.5 % — SIGNIFICANT CHANGE UP (ref 20.5–51.1)
MCHC RBC-ENTMCNC: 29.7 PG — SIGNIFICANT CHANGE UP (ref 27–31)
MCHC RBC-ENTMCNC: 33.5 G/DL — SIGNIFICANT CHANGE UP (ref 32–37)
MCV RBC AUTO: 88.8 FL — SIGNIFICANT CHANGE UP (ref 80–94)
MONOCYTES # BLD AUTO: 1.05 K/UL — HIGH (ref 0.1–0.6)
MONOCYTES NFR BLD AUTO: 10.1 % — HIGH (ref 1.7–9.3)
NEUTROPHILS # BLD AUTO: 5.79 K/UL — SIGNIFICANT CHANGE UP (ref 1.4–6.5)
NEUTROPHILS NFR BLD AUTO: 55.8 % — SIGNIFICANT CHANGE UP (ref 42.2–75.2)
NRBC # BLD: 0 /100 WBCS — SIGNIFICANT CHANGE UP (ref 0–0)
PLATELET # BLD AUTO: 273 K/UL — SIGNIFICANT CHANGE UP (ref 130–400)
PMV BLD: 11.1 FL — HIGH (ref 7.4–10.4)
POTASSIUM SERPL-MCNC: 5.7 MMOL/L — HIGH (ref 3.5–5)
POTASSIUM SERPL-SCNC: 5.7 MMOL/L — HIGH (ref 3.5–5)
PROT SERPL-MCNC: 7 G/DL — SIGNIFICANT CHANGE UP (ref 6–8)
RBC # BLD: 4.54 M/UL — LOW (ref 4.7–6.1)
RBC # FLD: 15 % — HIGH (ref 11.5–14.5)
SODIUM SERPL-SCNC: 143 MMOL/L — SIGNIFICANT CHANGE UP (ref 135–146)
WBC # BLD: 10.37 K/UL — SIGNIFICANT CHANGE UP (ref 4.8–10.8)
WBC # FLD AUTO: 10.37 K/UL — SIGNIFICANT CHANGE UP (ref 4.8–10.8)

## 2024-04-13 PROCEDURE — 36415 COLL VENOUS BLD VENIPUNCTURE: CPT

## 2024-04-13 PROCEDURE — 83735 ASSAY OF MAGNESIUM: CPT

## 2024-04-13 PROCEDURE — C1769: CPT

## 2024-04-13 PROCEDURE — 80053 COMPREHEN METABOLIC PANEL: CPT

## 2024-04-13 PROCEDURE — C2617: CPT

## 2024-04-13 PROCEDURE — 88305 TISSUE EXAM BY PATHOLOGIST: CPT | Mod: 26

## 2024-04-13 PROCEDURE — 85610 PROTHROMBIN TIME: CPT

## 2024-04-13 PROCEDURE — 82550 ASSAY OF CK (CPK): CPT

## 2024-04-13 PROCEDURE — 83036 HEMOGLOBIN GLYCOSYLATED A1C: CPT

## 2024-04-13 PROCEDURE — 84484 ASSAY OF TROPONIN QUANT: CPT

## 2024-04-13 PROCEDURE — 99285 EMERGENCY DEPT VISIT HI MDM: CPT

## 2024-04-13 PROCEDURE — 84100 ASSAY OF PHOSPHORUS: CPT

## 2024-04-13 PROCEDURE — 82962 GLUCOSE BLOOD TEST: CPT

## 2024-04-13 PROCEDURE — 86900 BLOOD TYPING SEROLOGIC ABO: CPT

## 2024-04-13 PROCEDURE — 74018 RADEX ABDOMEN 1 VIEW: CPT

## 2024-04-13 PROCEDURE — 88112 CYTOPATH CELL ENHANCE TECH: CPT | Mod: 26

## 2024-04-13 PROCEDURE — 86901 BLOOD TYPING SEROLOGIC RH(D): CPT

## 2024-04-13 PROCEDURE — 80061 LIPID PANEL: CPT

## 2024-04-13 PROCEDURE — 83605 ASSAY OF LACTIC ACID: CPT

## 2024-04-13 PROCEDURE — 85025 COMPLETE CBC W/AUTO DIFF WBC: CPT

## 2024-04-13 PROCEDURE — C1889: CPT

## 2024-04-13 PROCEDURE — 86850 RBC ANTIBODY SCREEN: CPT

## 2024-04-13 PROCEDURE — 82553 CREATINE MB FRACTION: CPT

## 2024-04-13 PROCEDURE — 88104 CYTOPATH FL NONGYN SMEARS: CPT | Mod: 26,59

## 2024-04-13 PROCEDURE — 85730 THROMBOPLASTIN TIME PARTIAL: CPT

## 2024-04-13 PROCEDURE — 84443 ASSAY THYROID STIM HORMONE: CPT

## 2024-04-13 PROCEDURE — 93010 ELECTROCARDIOGRAM REPORT: CPT

## 2024-04-13 PROCEDURE — C2625: CPT

## 2024-04-13 PROCEDURE — 85027 COMPLETE CBC AUTOMATED: CPT

## 2024-04-13 RX ORDER — MORPHINE SULFATE 50 MG/1
4 CAPSULE, EXTENDED RELEASE ORAL ONCE
Refills: 0 | Status: DISCONTINUED | OUTPATIENT
Start: 2024-04-13 | End: 2024-04-13

## 2024-04-13 RX ORDER — SODIUM CHLORIDE 9 MG/ML
1000 INJECTION INTRAMUSCULAR; INTRAVENOUS; SUBCUTANEOUS ONCE
Refills: 0 | Status: COMPLETED | OUTPATIENT
Start: 2024-04-13 | End: 2024-04-13

## 2024-04-13 RX ADMIN — MORPHINE SULFATE 4 MILLIGRAM(S): 50 CAPSULE, EXTENDED RELEASE ORAL at 22:57

## 2024-04-13 RX ADMIN — SODIUM CHLORIDE 1000 MILLILITER(S): 9 INJECTION INTRAMUSCULAR; INTRAVENOUS; SUBCUTANEOUS at 21:04

## 2024-04-13 RX ADMIN — MORPHINE SULFATE 4 MILLIGRAM(S): 50 CAPSULE, EXTENDED RELEASE ORAL at 21:04

## 2024-04-13 NOTE — ED PROVIDER NOTE - PHYSICAL EXAMINATION
CONSTITUTIONAL: Well-appearing; well-nourished; in no apparent distress.   EYES: PERRL; EOM intact.   NECK: Supple; non-tender; no cervical lymphadenopathy.   CARDIOVASCULAR: Normal S1, S2; no murmurs, rubs, or gallops. Equal radial pulses  RESPIRATORY: Normal chest excursion with respiration; breath sounds clear and equal bilaterally; no wheezes, rhonchi, or rales.  GI/: Normal bowel sounds; non-distended; non-tender; no palpable organomegaly. Neg murphys sign. No cva ttp  MS: No evidence of trauma or deformity. Normal ROM in all four extremities; non-tender to palpation; distal pulses are normal.   SKIN: Normal for age and race; warm; dry; good turgor; no apparent lesions or exudate.   NEURO/PSYCH: A & O x 4; grossly unremarkable. mood and manner are appropriate. Grooming and personal hygiene are appropriate. No apparent thoughts of harm to self or others.

## 2024-04-13 NOTE — ED PROVIDER NOTE - ATTENDING APP SHARED VISIT CONTRIBUTION OF CARE
48 yo M with hx of pancreatitis with pancreatic duct stone s/p ERCP with pancreatic stent and CBD stent placement, appendectomy who presents with chronic LUQ/epigastric pain, worse after eating. Pt follows with GI Dr. Post and is scheduled for stent removal on 4/22. Had labs and CT chest/abd/pelv done 3 days ago which showed normal labs/LFTs, acute on chronic pancreatitis. Last pancreatic stent replaced 3 mo ago. Today pain worsened and has been having elevated BP and sugars despite no hx of HTN or DM. No fever, sob, vomiting, diarrhea.    PMD Dr. Giraldo  GI Dr. Post  Gen surg Dr. Ansari    CONSTITUTIONAL: well developed, nontoxic appearing, in no acute distress, speaking in full sentences  SKIN: warm, dry, no rash, cap refill < 2 seconds  HEENT: normocephalic, atraumatic, no conjunctival erythema, moist mucous membranes, patent airway  NECK: supple  CV:  regular rate, regular rhythm, 2+ radial pulses bilaterally  RESP: no wheezes, no rales, no rhonchi, normal work of breathing  ABD: soft, no tenderness, nondistended, no rebound, no guarding  MSK: normal ROM, no cyanosis, no edema  NEURO: alert, oriented, grossly unremarkable  PSYCH: cooperative, appropriate    MDM:  Pt here with chronic abd pain in setting of known pancreatitis with both pancreatic duct stent and CBD stent. Had workup done 3 days ago including imaging. Vitals wnl in ED. No peritonitic signs. Will repeat labs and touch base with GI regarding additional imaging/dispo.

## 2024-04-13 NOTE — ED ADULT TRIAGE NOTE - CHIEF COMPLAINT QUOTE
Patient called back and I read message in system to her and she voiced understanding. She is at work but she will try to schedule with [facility] and will call us back with more details once she has it scheduled. pt with stents in pancreas and gallbladder c/o possible blockage. pt sugar also noted to be in 300s. pt reporting abdominal pain, high BP and tachycardia

## 2024-04-13 NOTE — ED PROVIDER NOTE - CLINICAL SUMMARY MEDICAL DECISION MAKING FREE TEXT BOX
Pt here with chronic abd pain in setting of known pancreatitis with both pancreatic duct stent and CBD stent. Had workup done 3 days ago including imaging. Vitals wnl in ED. No peritonitic signs. Labs wnl. Ekg unremarkable. Spoke with Dr. Post' team who requested admission, no further imaging indicated at this time. Pt requires admission for further management of abd pain given known pancreatic duct/CBD stents which may require operative intervention.

## 2024-04-13 NOTE — ED PROVIDER NOTE - OBJECTIVE STATEMENT
49 year old M with hx of chronic pancreatitis, pancreatic sphincterotomy, ERCP with biliary enterotomy, recent stenting of biliary and pancreatic ducts on 04/04 with Dr. Post presenting to er for abd pain. Sts for the last few weeks has had upper abd sharp pain worse with eating with assoc nausea. Pt had recent ct abd 04/09 with showed acute on chronic pancreatitis and stable pancreatic ductal dilatation. He denies any fever/chills, chest pain, sob, diarrhea or alterations in bowel habits, urinary symptoms.

## 2024-04-14 LAB
ALBUMIN SERPL ELPH-MCNC: 4.1 G/DL — SIGNIFICANT CHANGE UP (ref 3.5–5.2)
ALBUMIN SERPL ELPH-MCNC: 4.6 G/DL — SIGNIFICANT CHANGE UP (ref 3.5–5.2)
ALP SERPL-CCNC: 77 U/L — SIGNIFICANT CHANGE UP (ref 30–115)
ALP SERPL-CCNC: 84 U/L — SIGNIFICANT CHANGE UP (ref 30–115)
ALT FLD-CCNC: 12 U/L — SIGNIFICANT CHANGE UP (ref 0–41)
ALT FLD-CCNC: 13 U/L — SIGNIFICANT CHANGE UP (ref 0–41)
ANION GAP SERPL CALC-SCNC: 13 MMOL/L — SIGNIFICANT CHANGE UP (ref 7–14)
ANION GAP SERPL CALC-SCNC: 14 MMOL/L — SIGNIFICANT CHANGE UP (ref 7–14)
APTT BLD: 33.5 SEC — SIGNIFICANT CHANGE UP (ref 27–39.2)
APTT BLD: 33.6 SEC — SIGNIFICANT CHANGE UP (ref 27–39.2)
AST SERPL-CCNC: 15 U/L — SIGNIFICANT CHANGE UP (ref 0–41)
AST SERPL-CCNC: 16 U/L — SIGNIFICANT CHANGE UP (ref 0–41)
BASOPHILS # BLD AUTO: 0.08 K/UL — SIGNIFICANT CHANGE UP (ref 0–0.2)
BASOPHILS NFR BLD AUTO: 0.7 % — SIGNIFICANT CHANGE UP (ref 0–1)
BILIRUB SERPL-MCNC: 0.3 MG/DL — SIGNIFICANT CHANGE UP (ref 0.2–1.2)
BILIRUB SERPL-MCNC: <0.2 MG/DL — SIGNIFICANT CHANGE UP (ref 0.2–1.2)
BUN SERPL-MCNC: 10 MG/DL — SIGNIFICANT CHANGE UP (ref 10–20)
BUN SERPL-MCNC: 15 MG/DL — SIGNIFICANT CHANGE UP (ref 10–20)
CALCIUM SERPL-MCNC: 10.2 MG/DL — SIGNIFICANT CHANGE UP (ref 8.4–10.4)
CALCIUM SERPL-MCNC: 9.4 MG/DL — SIGNIFICANT CHANGE UP (ref 8.4–10.4)
CHLORIDE SERPL-SCNC: 102 MMOL/L — SIGNIFICANT CHANGE UP (ref 98–110)
CHLORIDE SERPL-SCNC: 107 MMOL/L — SIGNIFICANT CHANGE UP (ref 98–110)
CHOLEST SERPL-MCNC: 168 MG/DL — SIGNIFICANT CHANGE UP
CK MB CFR SERPL CALC: 1.3 NG/ML — SIGNIFICANT CHANGE UP (ref 0.6–6.3)
CK SERPL-CCNC: 60 U/L — SIGNIFICANT CHANGE UP (ref 0–225)
CO2 SERPL-SCNC: 23 MMOL/L — SIGNIFICANT CHANGE UP (ref 17–32)
CO2 SERPL-SCNC: 24 MMOL/L — SIGNIFICANT CHANGE UP (ref 17–32)
CREAT SERPL-MCNC: 0.7 MG/DL — SIGNIFICANT CHANGE UP (ref 0.7–1.5)
CREAT SERPL-MCNC: 1 MG/DL — SIGNIFICANT CHANGE UP (ref 0.7–1.5)
EGFR: 112 ML/MIN/1.73M2 — SIGNIFICANT CHANGE UP
EGFR: 91 ML/MIN/1.73M2 — SIGNIFICANT CHANGE UP
EOSINOPHIL # BLD AUTO: 0.29 K/UL — SIGNIFICANT CHANGE UP (ref 0–0.7)
EOSINOPHIL NFR BLD AUTO: 2.7 % — SIGNIFICANT CHANGE UP (ref 0–8)
GLUCOSE BLDC GLUCOMTR-MCNC: 118 MG/DL — HIGH (ref 70–99)
GLUCOSE BLDC GLUCOMTR-MCNC: 121 MG/DL — HIGH (ref 70–99)
GLUCOSE BLDC GLUCOMTR-MCNC: 136 MG/DL — HIGH (ref 70–99)
GLUCOSE SERPL-MCNC: 118 MG/DL — HIGH (ref 70–99)
GLUCOSE SERPL-MCNC: 129 MG/DL — HIGH (ref 70–99)
HCT VFR BLD CALC: 38.2 % — LOW (ref 42–52)
HCT VFR BLD CALC: 39.6 % — LOW (ref 42–52)
HDLC SERPL-MCNC: 47 MG/DL — SIGNIFICANT CHANGE UP
HGB BLD-MCNC: 12.6 G/DL — LOW (ref 14–18)
HGB BLD-MCNC: 12.9 G/DL — LOW (ref 14–18)
IMM GRANULOCYTES NFR BLD AUTO: 0.3 % — SIGNIFICANT CHANGE UP (ref 0.1–0.3)
INR BLD: 1 RATIO — SIGNIFICANT CHANGE UP (ref 0.65–1.3)
INR BLD: 1.06 RATIO — SIGNIFICANT CHANGE UP (ref 0.65–1.3)
LACTATE SERPL-SCNC: 0.7 MMOL/L — SIGNIFICANT CHANGE UP (ref 0.7–2)
LIPID PNL WITH DIRECT LDL SERPL: 108 MG/DL — HIGH
LYMPHOCYTES # BLD AUTO: 2.82 K/UL — SIGNIFICANT CHANGE UP (ref 1.2–3.4)
LYMPHOCYTES # BLD AUTO: 26.4 % — SIGNIFICANT CHANGE UP (ref 20.5–51.1)
MAGNESIUM SERPL-MCNC: 2.2 MG/DL — SIGNIFICANT CHANGE UP (ref 1.8–2.4)
MAGNESIUM SERPL-MCNC: 2.3 MG/DL — SIGNIFICANT CHANGE UP (ref 1.8–2.4)
MCHC RBC-ENTMCNC: 29.3 PG — SIGNIFICANT CHANGE UP (ref 27–31)
MCHC RBC-ENTMCNC: 29.5 PG — SIGNIFICANT CHANGE UP (ref 27–31)
MCHC RBC-ENTMCNC: 32.6 G/DL — SIGNIFICANT CHANGE UP (ref 32–37)
MCHC RBC-ENTMCNC: 33 G/DL — SIGNIFICANT CHANGE UP (ref 32–37)
MCV RBC AUTO: 89.5 FL — SIGNIFICANT CHANGE UP (ref 80–94)
MCV RBC AUTO: 90 FL — SIGNIFICANT CHANGE UP (ref 80–94)
MONOCYTES # BLD AUTO: 0.76 K/UL — HIGH (ref 0.1–0.6)
MONOCYTES NFR BLD AUTO: 7.1 % — SIGNIFICANT CHANGE UP (ref 1.7–9.3)
NEUTROPHILS # BLD AUTO: 6.7 K/UL — HIGH (ref 1.4–6.5)
NEUTROPHILS NFR BLD AUTO: 62.8 % — SIGNIFICANT CHANGE UP (ref 42.2–75.2)
NON HDL CHOLESTEROL: 121 MG/DL — SIGNIFICANT CHANGE UP
NRBC # BLD: 0 /100 WBCS — SIGNIFICANT CHANGE UP (ref 0–0)
NRBC # BLD: 0 /100 WBCS — SIGNIFICANT CHANGE UP (ref 0–0)
PHOSPHATE SERPL-MCNC: 3.5 MG/DL — SIGNIFICANT CHANGE UP (ref 2.1–4.9)
PLATELET # BLD AUTO: 304 K/UL — SIGNIFICANT CHANGE UP (ref 130–400)
PLATELET # BLD AUTO: 317 K/UL — SIGNIFICANT CHANGE UP (ref 130–400)
PMV BLD: 11.2 FL — HIGH (ref 7.4–10.4)
PMV BLD: 11.4 FL — HIGH (ref 7.4–10.4)
POTASSIUM SERPL-MCNC: 4.5 MMOL/L — SIGNIFICANT CHANGE UP (ref 3.5–5)
POTASSIUM SERPL-MCNC: 5.1 MMOL/L — HIGH (ref 3.5–5)
POTASSIUM SERPL-SCNC: 4.5 MMOL/L — SIGNIFICANT CHANGE UP (ref 3.5–5)
POTASSIUM SERPL-SCNC: 5.1 MMOL/L — HIGH (ref 3.5–5)
PROT SERPL-MCNC: 6.5 G/DL — SIGNIFICANT CHANGE UP (ref 6–8)
PROT SERPL-MCNC: 7.2 G/DL — SIGNIFICANT CHANGE UP (ref 6–8)
PROTHROM AB SERPL-ACNC: 11.4 SEC — SIGNIFICANT CHANGE UP (ref 9.95–12.87)
PROTHROM AB SERPL-ACNC: 12.1 SEC — SIGNIFICANT CHANGE UP (ref 9.95–12.87)
RBC # BLD: 4.27 M/UL — LOW (ref 4.7–6.1)
RBC # BLD: 4.4 M/UL — LOW (ref 4.7–6.1)
RBC # FLD: 14.9 % — HIGH (ref 11.5–14.5)
RBC # FLD: 15.1 % — HIGH (ref 11.5–14.5)
SODIUM SERPL-SCNC: 140 MMOL/L — SIGNIFICANT CHANGE UP (ref 135–146)
SODIUM SERPL-SCNC: 143 MMOL/L — SIGNIFICANT CHANGE UP (ref 135–146)
TRIGL SERPL-MCNC: 65 MG/DL — SIGNIFICANT CHANGE UP
TROPONIN T, HIGH SENSITIVITY RESULT: 11 NG/L — SIGNIFICANT CHANGE UP (ref 6–21)
WBC # BLD: 10.68 K/UL — SIGNIFICANT CHANGE UP (ref 4.8–10.8)
WBC # BLD: 9.59 K/UL — SIGNIFICANT CHANGE UP (ref 4.8–10.8)
WBC # FLD AUTO: 10.68 K/UL — SIGNIFICANT CHANGE UP (ref 4.8–10.8)
WBC # FLD AUTO: 9.59 K/UL — SIGNIFICANT CHANGE UP (ref 4.8–10.8)

## 2024-04-14 PROCEDURE — 99223 1ST HOSP IP/OBS HIGH 75: CPT

## 2024-04-14 PROCEDURE — 74018 RADEX ABDOMEN 1 VIEW: CPT | Mod: 26

## 2024-04-14 RX ORDER — LOSARTAN POTASSIUM 100 MG/1
25 TABLET, FILM COATED ORAL DAILY
Refills: 0 | Status: DISCONTINUED | OUTPATIENT
Start: 2024-04-14 | End: 2024-04-16

## 2024-04-14 RX ORDER — LIPASE/PROTEASE/AMYLASE 16-48-48K
1 CAPSULE,DELAYED RELEASE (ENTERIC COATED) ORAL
Refills: 0 | DISCHARGE

## 2024-04-14 RX ORDER — SODIUM CHLORIDE 9 MG/ML
1000 INJECTION, SOLUTION INTRAVENOUS
Refills: 0 | Status: DISCONTINUED | OUTPATIENT
Start: 2024-04-14 | End: 2024-04-16

## 2024-04-14 RX ORDER — HEPARIN SODIUM 5000 [USP'U]/ML
5000 INJECTION INTRAVENOUS; SUBCUTANEOUS EVERY 8 HOURS
Refills: 0 | Status: DISCONTINUED | OUTPATIENT
Start: 2024-04-14 | End: 2024-04-16

## 2024-04-14 RX ORDER — OXYCODONE AND ACETAMINOPHEN 5; 325 MG/1; MG/1
1 TABLET ORAL
Qty: 0 | Refills: 0 | DISCHARGE

## 2024-04-14 RX ORDER — MORPHINE SULFATE 50 MG/1
2 CAPSULE, EXTENDED RELEASE ORAL EVERY 4 HOURS
Refills: 0 | Status: DISCONTINUED | OUTPATIENT
Start: 2024-04-14 | End: 2024-04-14

## 2024-04-14 RX ORDER — OXYCODONE HYDROCHLORIDE 5 MG/1
10 TABLET ORAL EVERY 6 HOURS
Refills: 0 | Status: DISCONTINUED | OUTPATIENT
Start: 2024-04-14 | End: 2024-04-14

## 2024-04-14 RX ORDER — OXYCODONE HYDROCHLORIDE 5 MG/1
10 TABLET ORAL ONCE
Refills: 0 | Status: DISCONTINUED | OUTPATIENT
Start: 2024-04-14 | End: 2024-04-14

## 2024-04-14 RX ORDER — DEXTROSE 50 % IN WATER 50 %
15 SYRINGE (ML) INTRAVENOUS ONCE
Refills: 0 | Status: DISCONTINUED | OUTPATIENT
Start: 2024-04-14 | End: 2024-04-16

## 2024-04-14 RX ORDER — DEXTROSE 50 % IN WATER 50 %
25 SYRINGE (ML) INTRAVENOUS ONCE
Refills: 0 | Status: DISCONTINUED | OUTPATIENT
Start: 2024-04-14 | End: 2024-04-16

## 2024-04-14 RX ORDER — PANTOPRAZOLE SODIUM 20 MG/1
40 TABLET, DELAYED RELEASE ORAL
Refills: 0 | Status: DISCONTINUED | OUTPATIENT
Start: 2024-04-14 | End: 2024-04-16

## 2024-04-14 RX ORDER — OXYCODONE HYDROCHLORIDE 5 MG/1
1 TABLET ORAL
Refills: 0 | DISCHARGE

## 2024-04-14 RX ORDER — DEXTROSE 50 % IN WATER 50 %
12.5 SYRINGE (ML) INTRAVENOUS ONCE
Refills: 0 | Status: DISCONTINUED | OUTPATIENT
Start: 2024-04-14 | End: 2024-04-16

## 2024-04-14 RX ORDER — MORPHINE SULFATE 50 MG/1
2 CAPSULE, EXTENDED RELEASE ORAL EVERY 4 HOURS
Refills: 0 | Status: DISCONTINUED | OUTPATIENT
Start: 2024-04-14 | End: 2024-04-16

## 2024-04-14 RX ORDER — OXYCODONE HYDROCHLORIDE 5 MG/1
20 TABLET ORAL
Refills: 0 | Status: DISCONTINUED | OUTPATIENT
Start: 2024-04-14 | End: 2024-04-15

## 2024-04-14 RX ORDER — GLUCAGON INJECTION, SOLUTION 0.5 MG/.1ML
1 INJECTION, SOLUTION SUBCUTANEOUS ONCE
Refills: 0 | Status: DISCONTINUED | OUTPATIENT
Start: 2024-04-14 | End: 2024-04-16

## 2024-04-14 RX ORDER — LIPASE/PROTEASE/AMYLASE 16-48-48K
3 CAPSULE,DELAYED RELEASE (ENTERIC COATED) ORAL
Refills: 0 | Status: DISCONTINUED | OUTPATIENT
Start: 2024-04-14 | End: 2024-04-16

## 2024-04-14 RX ORDER — SODIUM CHLORIDE 9 MG/ML
1000 INJECTION INTRAMUSCULAR; INTRAVENOUS; SUBCUTANEOUS
Refills: 0 | Status: DISCONTINUED | OUTPATIENT
Start: 2024-04-14 | End: 2024-04-14

## 2024-04-14 RX ORDER — LIPASE/PROTEASE/AMYLASE 16-48-48K
1 CAPSULE,DELAYED RELEASE (ENTERIC COATED) ORAL EVERY 4 HOURS
Refills: 0 | Status: DISCONTINUED | OUTPATIENT
Start: 2024-04-14 | End: 2024-04-14

## 2024-04-14 RX ORDER — DEXTROSE 10 % IN WATER 10 %
125 INTRAVENOUS SOLUTION INTRAVENOUS ONCE
Refills: 0 | Status: DISCONTINUED | OUTPATIENT
Start: 2024-04-14 | End: 2024-04-16

## 2024-04-14 RX ORDER — INSULIN LISPRO 100/ML
VIAL (ML) SUBCUTANEOUS
Refills: 0 | Status: DISCONTINUED | OUTPATIENT
Start: 2024-04-14 | End: 2024-04-16

## 2024-04-14 RX ADMIN — MORPHINE SULFATE 2 MILLIGRAM(S): 50 CAPSULE, EXTENDED RELEASE ORAL at 07:28

## 2024-04-14 RX ADMIN — Medication 3 CAPSULE(S): at 15:46

## 2024-04-14 RX ADMIN — MORPHINE SULFATE 2 MILLIGRAM(S): 50 CAPSULE, EXTENDED RELEASE ORAL at 06:55

## 2024-04-14 RX ADMIN — OXYCODONE HYDROCHLORIDE 10 MILLIGRAM(S): 5 TABLET ORAL at 16:30

## 2024-04-14 RX ADMIN — LOSARTAN POTASSIUM 25 MILLIGRAM(S): 100 TABLET, FILM COATED ORAL at 16:25

## 2024-04-14 RX ADMIN — OXYCODONE HYDROCHLORIDE 10 MILLIGRAM(S): 5 TABLET ORAL at 16:25

## 2024-04-14 RX ADMIN — Medication 3 CAPSULE(S): at 22:11

## 2024-04-14 RX ADMIN — Medication 3 CAPSULE(S): at 11:11

## 2024-04-14 RX ADMIN — OXYCODONE HYDROCHLORIDE 10 MILLIGRAM(S): 5 TABLET ORAL at 16:03

## 2024-04-14 RX ADMIN — MORPHINE SULFATE 2 MILLIGRAM(S): 50 CAPSULE, EXTENDED RELEASE ORAL at 02:53

## 2024-04-14 RX ADMIN — OXYCODONE HYDROCHLORIDE 10 MILLIGRAM(S): 5 TABLET ORAL at 15:46

## 2024-04-14 RX ADMIN — Medication 3 CAPSULE(S): at 07:41

## 2024-04-14 NOTE — H&P ADULT - NSHPLABSRESULTS_GEN_ALL_CORE
LABS:                        13.5   10.37 )-----------( 273      ( 13 Apr 2024 21:00 )             40.3     04-13    143  |  104  |  14  ----------------------------<  155<H>  5.7<H>   |  26  |  1.1    Ca    9.5      13 Apr 2024 21:00    TPro  7.0  /  Alb  4.5  /  TBili  <0.2  /  DBili  <0.2  /  AST  31  /  ALT  15  /  AlkPhos  87  04-13

## 2024-04-14 NOTE — H&P ADULT - ATTENDING COMMENTS
#Abdominal pain, presumed acute on chronic pancreatitis  ct abd 4/9 with acute pancreatitis; stable pancreatic duct dilation  s/p recent stenting  advance gi eval  lr 125 cc/hr  low fat diet  pain control  creon 12,000 3 tab 5x daily    #Progress Note Handoff  Pending (specify): ivf, pain control; advance gi eval  Family discussion: d/w pt at bedside  Disposition: home

## 2024-04-14 NOTE — CONSULT NOTE ADULT - SUBJECTIVE AND OBJECTIVE BOX
Gastroenterology Consultation:    Patient is a 49y old  Male who presents with a chief complaint of       Admitted on: 04-13-24      HPI:  a 49 year old male with a hx of chronic pancreatitis, with PD and CBD stents with a stent exchange in january 2024 presents with abdominal pain. Patient saw Dr. Post who requested admission for possible intervention. Patient has chornic severe pain since he had the PD stents placed in january 2024. He was scheduled for stent exchange with Dr. Post on 04/22/2024. Patient today also had the same pain but was also tachycardic and had left sided subcostal chest pain for few minutes which resolved.. To note that the patient also saw Dr. Ansari for evaluation of cholecystectomy which was deemed not feasible and patient was being evaluated for whipple Sx.  The patient is admitted for possible acute on chronic pancreatitis    Labs signifiacant for K+ 5.7 ( hemolyzed), lipase 63     CTAP on 09/04/24 showed:   - Acute on chronic pancreatitis with stable pancreatic heterogeneity, stable mild pancreatic duct dilatation in the tail of the pancreas, but increasing calcifications.  - No definite evidence of pancreatic mass  - New attenuation of the distal splenic vein  - Multiple pancreaticoduodenal stents, appears to be 4, correlation with surgical history is recommended  - Heterogeneous arterial enhancement of the liver, felt to likely be perfusional in nature.   - However, outpatient MRI liver with gadolinium is recommended for further characterization.    CT chest showed:   - Mild emphysematous changes.  - No suspicious pulmonary parenchymal lesions    Vital Signs Last 24 Hrs  T(C): 37 (14 Apr 2024 00:30), Max: 37 (14 Apr 2024 00:30)  T(F): 98.6 (14 Apr 2024 00:30), Max: 98.6 (14 Apr 2024 00:30)  HR: 66 (14 Apr 2024 00:30) (66 - 73)  BP: 133/96 (14 Apr 2024 00:30) (133/96 - 168/95)  RR: 18 (14 Apr 2024 00:30) (18 - 18)  SpO2: 100% (14 Apr 2024 00:30) (97% - 100%)  Patient On (Oxygen Delivery Method): room air         (14 Apr 2024 00:26)        Prior EGD:    Prior Colonoscopy:      PAST MEDICAL & SURGICAL HISTORY:  Pancreatitis      Back pain  buldging and herniated discs      Lumbar herniated disc      Bulging lumbar disc      S/P appendectomy      History of ERCP  with pancreatic stent placement            FAMILY HISTORY:  No pertinent family history in first degree relatives        Social History:  Tobacco:  Alcohol:  Drugs:    Home Medications:  Creon 36,000 units oral delayed release capsule: 1 cap(s) orally 8 times a day (14 Apr 2024 01:48)  ibuprofen 600 mg oral tablet: 1 tab(s) orally prn (29 Jan 2024 12:19)  Lyrica 75 mg oral capsule: 1 cap(s) orally 2 times a day (14 Apr 2024 01:48)  oxyCODONE 10 mg oral tablet: 1 tab(s) orally 4 times a day (14 Apr 2024 01:48)        MEDICATIONS  (STANDING):  dextrose 10% Bolus 125 milliLiter(s) IV Bolus once  dextrose 5%. 1000 milliLiter(s) (100 mL/Hr) IV Continuous <Continuous>  dextrose 5%. 1000 milliLiter(s) (50 mL/Hr) IV Continuous <Continuous>  dextrose 50% Injectable 25 Gram(s) IV Push once  dextrose 50% Injectable 12.5 Gram(s) IV Push once  glucagon  Injectable 1 milliGRAM(s) IntraMuscular once  heparin   Injectable 5000 Unit(s) SubCutaneous every 8 hours  insulin lispro (ADMELOG) corrective regimen sliding scale   SubCutaneous three times a day before meals  lactated ringers. 1000 milliLiter(s) (125 mL/Hr) IV Continuous <Continuous>  pancrelipase  (CREON 12,000 Lipase Units) 3 Capsule(s) Oral <User Schedule>  pantoprazole    Tablet 40 milliGRAM(s) Oral before breakfast  pregabalin 75 milliGRAM(s) Oral two times a day    MEDICATIONS  (PRN):  dextrose Oral Gel 15 Gram(s) Oral once PRN Blood Glucose LESS THAN 70 milliGRAM(s)/deciliter  oxycodone    5 mG/acetaminophen 325 mG 1 Tablet(s) Oral every 4 hours PRN Severe Pain (7 - 10)      Allergies  No Known Allergies      Review of Systems:   Constitutional:  No Fever, No Chills  ENT/Mouth:  No Hearing Changes,  No Difficulty Swallowing  Eyes:  No Eye Pain, No Vision Changes  Cardiovascular:  No Chest Pain, No Palpitations  Respiratory:  No Cough, No Dyspnea  Gastrointestinal:  As described in HPI          Physical Examination:  T(C): 36.8 (04-14-24 @ 07:59), Max: 37 (04-14-24 @ 00:30)  HR: 71 (04-14-24 @ 07:59) (66 - 73)  BP: 143/69 (04-14-24 @ 07:59) (133/96 - 168/95)  RR: 18 (04-14-24 @ 07:59) (18 - 18)  SpO2: 100% (04-14-24 @ 07:59) (97% - 100%)    Weight (kg): 70.3 (04-13-24 @ 20:19)        GENERAL: AAOx3, no acute distress.  HEAD:  Atraumatic, Normocephalic  EYES: conjunctiva and sclera clear  CHEST/LUNG: Clear to auscultation bilaterally; No wheeze, rhonchi, or rales  HEART: Regular rate and rhythm; normal S1, S2, No murmurs.  ABDOMEN: Soft, nontender, nondistended; Bowel sounds present  SKIN: Intact, no jaundice        Data:                        12.6   10.68 )-----------( 304      ( 14 Apr 2024 05:58 )             38.2     Hgb Trend:  12.6  04-14-24 @ 05:58  13.5  04-13-24 @ 21:00      04-14    143  |  107  |  10  ----------------------------<  118<H>  4.5   |  23  |  0.7    Ca    9.4      14 Apr 2024 05:58  Phos  3.5     04-14  Mg     2.2     04-14    TPro  6.5  /  Alb  4.1  /  TBili  0.3  /  DBili  x   /  AST  15  /  ALT  12  /  AlkPhos  77  04-14    Liver panel trend:  TBili 0.3   /   AST 15   /   ALT 12   /   AlkP 77   /   Tptn 6.5   /   Alb 4.1    /   DBili --      04-14  TBili <0.2   /   AST 31   /   ALT 15   /   AlkP 87   /   Tptn 7.0   /   Alb 4.5    /   DBili <0.2      04-13      PT/INR - ( 14 Apr 2024 05:58 )   PT: 12.10 sec;   INR: 1.06 ratio         PTT - ( 14 Apr 2024 05:58 )  PTT:33.5 sec        Radiology:       Gastroenterology Consultation:    Patient is a 49y old  Male who presents with a chief complaint of       Admitted on: 04-13-24      HPI:  49-year-old male hx of chronic pancreatitis, with PD and CBD stents with a stent exchange in january 2024 presented to ER with diaphoresis, high blood pressure and epigastric pain. Patietn had a televisit with  in 2.24 and he was having mild pain, had avisit with  10 days ago, CTAP was ordered revealing acute on chronic pancreatitis. Patient currently tolerating regular food, pain improved since ER admission. As per wife she reports hhis blood glucose was in 300s yesterday with BP>150/100 which prompted them to ER. GI consulted for further evaluation .    GI hx :  initially seen for dilated common bile duct and pancreatic duct on imaging, associated with symptomatic abdominal pain, postprandial pain and some weight loss, extensive work-up including ERCP and endoscopic ultrasound revealed a calcified impacted pancreatic duct stone at the pancreatic general/head, the stone is located right at the junction of the pancreatic neck/head. Patient had subsequent multiple ERCPs with bilious enterotomy, pancreatic sphincterotomy and pancreatic stent placement. Pancreatic stent placement usually alleviates his pain. He had multiple ERCPs with stent exchanges to obtain ideal stent diameter and stent length as those pancreatic stents frequently occluded. During 1 ERCP the stent had migrated almost out of the pancreatic duct. He also had brushing at the pancreatic duct that was revealing for possible atypia with some low-grade dysplasia. No evidence of adenocarcinoma. Endoscopic work-up with endoscopic ultrasound as well as MRI and CAT scan revealed no pancreatic masses or signs of adenocarcinoma. Last ERCP patient had 2 [id number] pancreatic stent placement to achieve better drainage of the pancreatic duct. He went for second opinion at [facility] [place], and case discussed with the advanced endoscopist, the overall agreed with our management, only solution for permanent relief of his chronic pancreatitis will be a Whipple procedure. At this time he feels better.          PAST MEDICAL & SURGICAL HISTORY:  Pancreatitis      Back pain  buldging and herniated discs      Lumbar herniated disc      Bulging lumbar disc      S/P appendectomy      History of ERCP  with pancreatic stent placement            FAMILY HISTORY:  No pertinent family history in first degree relatives        Social History:  Tobacco:  Alcohol:  Drugs:    Home Medications:  Creon 36,000 units oral delayed release capsule: 1 cap(s) orally 8 times a day (14 Apr 2024 01:48)  ibuprofen 600 mg oral tablet: 1 tab(s) orally prn (29 Jan 2024 12:19)  Lyrica 75 mg oral capsule: 1 cap(s) orally 2 times a day (14 Apr 2024 01:48)  oxyCODONE 10 mg oral tablet: 1 tab(s) orally 4 times a day (14 Apr 2024 01:48)        MEDICATIONS  (STANDING):  dextrose 10% Bolus 125 milliLiter(s) IV Bolus once  dextrose 5%. 1000 milliLiter(s) (100 mL/Hr) IV Continuous <Continuous>  dextrose 5%. 1000 milliLiter(s) (50 mL/Hr) IV Continuous <Continuous>  dextrose 50% Injectable 25 Gram(s) IV Push once  dextrose 50% Injectable 12.5 Gram(s) IV Push once  glucagon  Injectable 1 milliGRAM(s) IntraMuscular once  heparin   Injectable 5000 Unit(s) SubCutaneous every 8 hours  insulin lispro (ADMELOG) corrective regimen sliding scale   SubCutaneous three times a day before meals  lactated ringers. 1000 milliLiter(s) (125 mL/Hr) IV Continuous <Continuous>  pancrelipase  (CREON 12,000 Lipase Units) 3 Capsule(s) Oral <User Schedule>  pantoprazole    Tablet 40 milliGRAM(s) Oral before breakfast  pregabalin 75 milliGRAM(s) Oral two times a day    MEDICATIONS  (PRN):  dextrose Oral Gel 15 Gram(s) Oral once PRN Blood Glucose LESS THAN 70 milliGRAM(s)/deciliter  oxycodone    5 mG/acetaminophen 325 mG 1 Tablet(s) Oral every 4 hours PRN Severe Pain (7 - 10)      Allergies  No Known Allergies      Review of Systems:   Constitutional:  No Fever, No Chills  ENT/Mouth:  No Hearing Changes,  No Difficulty Swallowing  Eyes:  No Eye Pain, No Vision Changes  Cardiovascular:  No Chest Pain, No Palpitations  Respiratory:  No Cough, No Dyspnea  Gastrointestinal:  As described in HPI          Physical Examination:  T(C): 36.8 (04-14-24 @ 07:59), Max: 37 (04-14-24 @ 00:30)  HR: 71 (04-14-24 @ 07:59) (66 - 73)  BP: 143/69 (04-14-24 @ 07:59) (133/96 - 168/95)  RR: 18 (04-14-24 @ 07:59) (18 - 18)  SpO2: 100% (04-14-24 @ 07:59) (97% - 100%)    Weight (kg): 70.3 (04-13-24 @ 20:19)        GENERAL: AAOx3, no acute distress.  HEAD:  Atraumatic, Normocephalic  EYES: conjunctiva and sclera clear  CHEST/LUNG: Clear to auscultation bilaterally; No wheeze, rhonchi, or rales  HEART: Regular rate and rhythm; normal S1, S2, No murmurs.  ABDOMEN: Soft, nontender, nondistended; Bowel sounds present  SKIN: Intact, no jaundice        Data:                        12.6   10.68 )-----------( 304      ( 14 Apr 2024 05:58 )             38.2     Hgb Trend:  12.6  04-14-24 @ 05:58  13.5  04-13-24 @ 21:00      04-14    143  |  107  |  10  ----------------------------<  118<H>  4.5   |  23  |  0.7    Ca    9.4      14 Apr 2024 05:58  Phos  3.5     04-14  Mg     2.2     04-14    TPro  6.5  /  Alb  4.1  /  TBili  0.3  /  DBili  x   /  AST  15  /  ALT  12  /  AlkPhos  77  04-14    Liver panel trend:  TBili 0.3   /   AST 15   /   ALT 12   /   AlkP 77   /   Tptn 6.5   /   Alb 4.1    /   DBili --      04-14  TBili <0.2   /   AST 31   /   ALT 15   /   AlkP 87   /   Tptn 7.0   /   Alb 4.5    /   DBili <0.2      04-13      PT/INR - ( 14 Apr 2024 05:58 )   PT: 12.10 sec;   INR: 1.06 ratio         PTT - ( 14 Apr 2024 05:58 )  PTT:33.5 sec        Radiology:    < from: CT Abdomen and Pelvis w/ IV Cont (04.09.24 @ 10:42) >  stable mild pancreatic duct dilatation in the tail of the pancreas, but   increasing calcifications.    No definite evidence of pancreatic mass    New attenuation of the distal splenic vein    Multiple pancreaticoduodenal stents, appears to be 4, correlation with   surgical history is recommended    Heterogeneous arterial enhancement of the liver, felt to likely be   perfusional in nature. However, outpatient MRI liver with gadolinium is   recommended for further characterization.

## 2024-04-14 NOTE — H&P ADULT - HISTORY OF PRESENT ILLNESS
a 49 year old male with a hx of chronic pancreatitis, with PD and CBD stents with a stent exchange in january 2024 presents with abdominal pain. Patient saw Dr. Post who requested admission for possible intervention.     Labs signifiacant for K+ 5.7 ( hemolyzed), lipase 63     CTAP on 09/04/24 showed:   - Acute on chronic pancreatitis with stable pancreatic heterogeneity, stable mild pancreatic duct dilatation in the tail of the pancreas, but increasing calcifications.  - No definite evidence of pancreatic mass  - New attenuation of the distal splenic vein  - Multiple pancreaticoduodenal stents, appears to be 4, correlation with surgical history is recommended  - Heterogeneous arterial enhancement of the liver, felt to likely be perfusional in nature.   - However, outpatient MRI liver with gadolinium is recommended for further characterization.    CT chest showed:   - Mild emphysematous changes.  - No suspicious pulmonary parenchymal lesions    Vital Signs Last 24 Hrs  T(C): 37 (14 Apr 2024 00:30), Max: 37 (14 Apr 2024 00:30)  T(F): 98.6 (14 Apr 2024 00:30), Max: 98.6 (14 Apr 2024 00:30)  HR: 66 (14 Apr 2024 00:30) (66 - 73)  BP: 133/96 (14 Apr 2024 00:30) (133/96 - 168/95)  RR: 18 (14 Apr 2024 00:30) (18 - 18)  SpO2: 100% (14 Apr 2024 00:30) (97% - 100%)  Patient On (Oxygen Delivery Method): room air         a 49 year old male with a hx of chronic pancreatitis, with PD and CBD stents with a stent exchange in january 2024 presents with abdominal pain. Patient saw Dr. Post who requested admission for possible intervention. Patient has chornic severe pain since he had the PD stents placed in january 2024. He was scheduled for stent exchange with Dr. Post on 04/22/2024. Patient today also had the same pain but was also tachycardic and had left sided subcostal chest pain for few minutes which resolved.. To note that the patient also saw Dr. Ansari for evaluation of cholecystectomy which was deemed not feasible and patient was being evaluated for whipple Sx.  The patient is admitted for possible acute on chronic pancreatitis    Labs signifiacant for K+ 5.7 ( hemolyzed), lipase 63     CTAP on 09/04/24 showed:   - Acute on chronic pancreatitis with stable pancreatic heterogeneity, stable mild pancreatic duct dilatation in the tail of the pancreas, but increasing calcifications.  - No definite evidence of pancreatic mass  - New attenuation of the distal splenic vein  - Multiple pancreaticoduodenal stents, appears to be 4, correlation with surgical history is recommended  - Heterogeneous arterial enhancement of the liver, felt to likely be perfusional in nature.   - However, outpatient MRI liver with gadolinium is recommended for further characterization.    CT chest showed:   - Mild emphysematous changes.  - No suspicious pulmonary parenchymal lesions    Vital Signs Last 24 Hrs  T(C): 37 (14 Apr 2024 00:30), Max: 37 (14 Apr 2024 00:30)  T(F): 98.6 (14 Apr 2024 00:30), Max: 98.6 (14 Apr 2024 00:30)  HR: 66 (14 Apr 2024 00:30) (66 - 73)  BP: 133/96 (14 Apr 2024 00:30) (133/96 - 168/95)  RR: 18 (14 Apr 2024 00:30) (18 - 18)  SpO2: 100% (14 Apr 2024 00:30) (97% - 100%)  Patient On (Oxygen Delivery Method): room air

## 2024-04-14 NOTE — PATIENT PROFILE ADULT - LIVING ENVIRONMENT
Detail Level: Generalized Detail Level: Simple Detail Level: Detailed Patient Specific Counseling (Will Not Stick From Patient To Patient): Podiatrist Detail Level: Zone no

## 2024-04-14 NOTE — H&P ADULT - ASSESSMENT
# possible Acute on Chronic Pncreatitis:  - lipase 63  - Referred by Dr. Post   - Last CBD and PD exchange in 01/2024  - CTAP on 09/04 showed acute on chronic pancreatitis   - s/p morphine and NS in ED  - CW normal saline @ 100 cc/hr  - NPO for now  - Advanced GI consult Dr. Post for possible exchange   - FU [facility] CMP TSH A1c Lipid panel, Lactate   - morphine 2Q4 for pain PRN     Diet: NPO for now   Activity: as tolerated   DVT Prophylaxis: hold ofF now in case of ERCP   GI Prophylaxis: Protonix   Code Status: Full   Disposition: Medicine    a 49 year old male with a hx of chronic pancreatitis, with PD and CBD stents with a stent exchange in january 2024 presents with abdominal pain. Patient saw Dr. Post who requested admission for possible intervention. Patient has chornic severe pain since he had the PD stents placed in january 2024. He was scheduled for stent exchange with Dr. Post on 04/22/2024. Patient today also had the same pain but was also tachycardic and had left sided subcostal chest pain for few minutes which resolved.. To note that the patient also saw Dr. Ansari for evaluation of cholecystectomy which was deemed not feasible and patient was being evaluated for whipple Sx.  The patient is admitted for possible acute on chronic pancreatitis    # possible Acute on Chronic Pncreatitis:  - lipase 63CTAP on 09/04/24 showed:   - Acute on chronic pancreatitis with stable pancreatic heterogeneity, stable mild pancreatic duct dilatation in the tail of the pancreas, but increasing calcifications.  - No definite evidence of pancreatic mass  - New attenuation of the distal splenic vein  - Multiple pancreaticoduodenal stents, appears to be 4, correlation with surgical history is recommended  - Heterogeneous arterial enhancement of the liver, felt to likely be perfusional in nature.   - However, outpatient MRI liver with gadolinium is recommended for further characterization.  - Last CBD and PD exchange in 01/2024  - CTAP on 09/04 showed acute on chronic pancreatitis   - s/p morphine and NS in ED  - CW normal saline @ 150 cc/hr  - NPO for now  - Advanced GI consult Dr. Post for possible exchange   - FU [facility] CMP TSH A1c Lipid panel, Lactate   - morphine 2Q4 for pain PRN   - Advanced GI consult Dr. Post     # possible new onset DM   - check A1c  - ISS and adjust     Diet: NPO for now   Activity: as tolerated   DVT Prophylaxis: hold off now in case of ERCP   GI Prophylaxis: Protonix   Code Status: Full   Disposition: Medicine

## 2024-04-14 NOTE — H&P ADULT - NSHPPHYSICALEXAM_GEN_ALL_CORE
General: AOx3, NAD   Heart: RRR, no murmur   Chest: GBAE, no crackles, or wheezing   Abdomen: Soft, mildly, tender, non-distended   Extremities: + PP, no edema General: AOx3, NAD   Heart: RRR, no murmur   Chest: GBAE, no crackles, or wheezing   Abdomen: Soft, mildly tender, non-distended   Extremities: + PP, no edema

## 2024-04-15 ENCOUNTER — TRANSCRIPTION ENCOUNTER (OUTPATIENT)
Age: 49
End: 2024-04-15

## 2024-04-15 DIAGNOSIS — Z79.899 OTHER LONG TERM (CURRENT) DRUG THERAPY: ICD-10-CM

## 2024-04-15 DIAGNOSIS — R73.9 HYPERGLYCEMIA, UNSPECIFIED: ICD-10-CM

## 2024-04-15 DIAGNOSIS — R10.9 UNSPECIFIED ABDOMINAL PAIN: ICD-10-CM

## 2024-04-15 LAB
A1C WITH ESTIMATED AVERAGE GLUCOSE RESULT: 6.5 % — HIGH (ref 4–5.6)
ESTIMATED AVERAGE GLUCOSE: 140 MG/DL — HIGH (ref 68–114)
GLUCOSE BLDC GLUCOMTR-MCNC: 117 MG/DL — HIGH (ref 70–99)
GLUCOSE BLDC GLUCOMTR-MCNC: 151 MG/DL — HIGH (ref 70–99)
GLUCOSE BLDC GLUCOMTR-MCNC: 169 MG/DL — HIGH (ref 70–99)
TSH SERPL-MCNC: 1.24 UIU/ML — SIGNIFICANT CHANGE UP (ref 0.27–4.2)

## 2024-04-15 PROCEDURE — 43274 ERCP DUCT STENT PLACEMENT: CPT | Mod: XS

## 2024-04-15 PROCEDURE — 43261 ENDO CHOLANGIOPANCREATOGRAPH: CPT | Mod: XU

## 2024-04-15 PROCEDURE — 74330 X-RAY BILE/PANC ENDOSCOPY: CPT | Mod: 26

## 2024-04-15 PROCEDURE — 99233 SBSQ HOSP IP/OBS HIGH 50: CPT

## 2024-04-15 PROCEDURE — 43276 ERCP STENT EXCHANGE W/DILATE: CPT

## 2024-04-15 RX ORDER — LABETALOL HCL 100 MG
10 TABLET ORAL ONCE
Refills: 0 | Status: DISCONTINUED | OUTPATIENT
Start: 2024-04-15 | End: 2024-04-16

## 2024-04-15 RX ORDER — OXYCODONE HYDROCHLORIDE 5 MG/1
10 TABLET ORAL ONCE
Refills: 0 | Status: DISCONTINUED | OUTPATIENT
Start: 2024-04-15 | End: 2024-04-15

## 2024-04-15 RX ORDER — OXYCODONE HYDROCHLORIDE 5 MG/1
10 TABLET ORAL EVERY 6 HOURS
Refills: 0 | Status: DISCONTINUED | OUTPATIENT
Start: 2024-04-15 | End: 2024-04-16

## 2024-04-15 RX ORDER — CHLORHEXIDINE GLUCONATE 213 G/1000ML
1 SOLUTION TOPICAL DAILY
Refills: 0 | Status: DISCONTINUED | OUTPATIENT
Start: 2024-04-15 | End: 2024-04-16

## 2024-04-15 RX ADMIN — OXYCODONE HYDROCHLORIDE 20 MILLIGRAM(S): 5 TABLET ORAL at 03:11

## 2024-04-15 RX ADMIN — OXYCODONE HYDROCHLORIDE 10 MILLIGRAM(S): 5 TABLET ORAL at 18:01

## 2024-04-15 RX ADMIN — SODIUM CHLORIDE 125 MILLILITER(S): 9 INJECTION, SOLUTION INTRAVENOUS at 03:14

## 2024-04-15 RX ADMIN — LOSARTAN POTASSIUM 25 MILLIGRAM(S): 100 TABLET, FILM COATED ORAL at 05:47

## 2024-04-15 RX ADMIN — OXYCODONE HYDROCHLORIDE 10 MILLIGRAM(S): 5 TABLET ORAL at 15:19

## 2024-04-15 RX ADMIN — OXYCODONE HYDROCHLORIDE 10 MILLIGRAM(S): 5 TABLET ORAL at 19:07

## 2024-04-15 NOTE — PRE-OP CHECKLIST - NS PREOP CHK HIBICLENS NA
Last Office Visit  -  7/20/21  Next Office Visit  -  n/a    Last Filled  -    Last UDS -    Contract -
N/A

## 2024-04-15 NOTE — HISTORY OF PRESENT ILLNESS
[de-identified] : AMY CARRILLO  is a pleasant 48 year year old man  who came in 2023 for pancreatic duct dilatation . 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, smoking > 30 years i pack/day, \par  family hx: father  at 80s with CA, mother  at 40s from brain tumor and she had diabetes, \par  \par  CT chest 2023: done for smoking history showed calcifications in pancreas and dilated PD to 10mm, \par  MRI 2023: 8MM PD\par  3/9/2023: EUS DILATED pd 5-7MM, HYPERECHOIC STRUCTURE 9MM UNCINATE, WITHIN MAIN PD SUGGESTIVE OF STONE\par  He had pancreatic stent placed on  with resolution of his pain but possible returning back recently\par  \par  : he was recently admitted to [facility] for severe pain with ERCP done with change of his PD on may , he feels better now, brushing during ERCP showed atypical cells, 2023-> MRI showed enlarged head of pancreas suggestive of mild localized pancreatitis \par  \par    \par  \par

## 2024-04-15 NOTE — ED ADULT NURSE NOTE - CHIEF COMPLAINT QUOTE
pt with stents in pancreas and gallbladder c/o possible blockage. pt sugar also noted to be in 300s. pt reporting abdominal pain, high BP and tachycardia

## 2024-04-15 NOTE — CHART NOTE - NSCHARTNOTEFT_GEN_A_CORE
s/p ERCP:    A snare was used to remove the previously placed PD stents and CBD stent. s/p cholangiogram and plastic CBD stent was placed with excellent drainage.   PD was cannulated, , Pancreaticoogram revealed a filling defect in the proximal PD consistent with previously known PD stone with dilated PD. Brush of PD was performed for cytology. Over guidewire, a 7 Fr x 9 cm pigtail stent was placed in PD. Over another guidewire a second 7 Fr x9 cm pigtail was placed in the PD.    recommendations:    Clear liquid diet today.  Advance diet in am if uneventful.  Watch for post ERCP pancreatitis.  Follow up in office in 2 to 3 weeks.  Await cytology results.   Possible lithotripsy in future

## 2024-04-15 NOTE — PRE-ANESTHESIA EVALUATION ADULT - NSANTHADDINFOFT_GEN_ALL_CORE
risks, benefits, alternatives, discussed with the patient and he agrees to proceed as planned. Patient seen, examined, consent obtained prior to transfer to OR

## 2024-04-15 NOTE — PROGRESS NOTE ADULT - PROBLEM SELECTOR PLAN 1
due to acute on chronic pancreatitis  ct abd 4/9 with acute pancreatitis; stable pancreatic duct dilation  s/p ERCP 4/15 with PD, cbd stent removal; placement cbd stent  cld  pain control  creon 12,000 3 tab 5x daily  appreciate gi

## 2024-04-15 NOTE — PRE-ANESTHESIA EVALUATION ADULT - NSANTHOSAYNRD_GEN_A_CORE
denies/No. DAVID screening performed.  STOP BANG Legend: 0-2 = LOW Risk; 3-4 = INTERMEDIATE Risk; 5-8 = HIGH Risk

## 2024-04-15 NOTE — ASSESSMENT
[FreeTextEntry1] : AMY CARRILLO  is a pleasant 48 year year old man  who came in 2023 for pancreatic duct dilatation . He has Dr MIRA ETIENNE as His PCP.  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, smoking > 30 years i pack/day,  family hx: father  at 80s with CA, mother  at 40s from brain tumor and she had diabetes,   CT chest 2023: done for smoking history showed calcifications in pancreas and dilated PD to 10mm,  MRI 2023: 8MM PD 3/9/2023: EUS DILATED pd 5-7MM, HYPERECHOIC STRUCTURE 9MM UNCINATE, WITHIN MAIN PD SUGGESTIVE OF STONE He had pancreatic stent placed on  with resolution of his pain but possible returning back recently  2023: shared concern of diffuse dilated PD, possible stone vs neoplastic disease, sending for ca 19-9, new CTs, will discuss at GI tumor board and see him in couple weeks  : he was recently admitted to [facility] for severe pain with ERCP done with change of his PD on may , he feels better now, brushing during ERCP showed atypical cells, 2023-> MRI showed enlarged head of pancreas suggestive of mild localized pancreatitis  i discussed in details the potential of stone vs neoplastic process in head of pancreas, will need to discuss at GI tumor board and patient and his friend they have my cell to contact me in couple weeks to get the final decision, we discussed stone removal by various endoscopic technique and will discuss this with dr Resendiz,he has coming CT in early 2024: he had repeated Endoscopy and stenting both biliary and pancreatic ducts, he expereinced worsening o fhis pain we rediscussed whipple surgery as options for his recurrent pancreatitis with severe calcifications/stones at head of pancreas, will repeat CT CAP, discuss with dr Resendiz and at GI tumor board  2024: we rediscussed need to control pain and possible endoscopic interventions including lithotripsy and pros and cons for surgery(whipple)  the above plan of care with discussed in details to the patient and all questions were answered to patient satisfaction. patient instructed to follow up with the referring physician and patient primary care provider  A total of 30 minutes was spent on this visit, obtaining h/p,  reviewing previous notes/imaging/scopes, counseling the patient on possible etiology and procedure , ordering tests (above), and documenting the findings in the note.

## 2024-04-16 ENCOUNTER — TRANSCRIPTION ENCOUNTER (OUTPATIENT)
Age: 49
End: 2024-04-16

## 2024-04-16 VITALS
DIASTOLIC BLOOD PRESSURE: 86 MMHG | HEART RATE: 67 BPM | SYSTOLIC BLOOD PRESSURE: 163 MMHG | RESPIRATION RATE: 18 BRPM | TEMPERATURE: 97 F

## 2024-04-16 LAB
ALBUMIN SERPL ELPH-MCNC: 4.3 G/DL — SIGNIFICANT CHANGE UP (ref 3.5–5.2)
ALP SERPL-CCNC: 81 U/L — SIGNIFICANT CHANGE UP (ref 30–115)
ALT FLD-CCNC: 14 U/L — SIGNIFICANT CHANGE UP (ref 0–41)
ANION GAP SERPL CALC-SCNC: 17 MMOL/L — HIGH (ref 7–14)
AST SERPL-CCNC: 15 U/L — SIGNIFICANT CHANGE UP (ref 0–41)
BASOPHILS # BLD AUTO: 0.08 K/UL — SIGNIFICANT CHANGE UP (ref 0–0.2)
BASOPHILS NFR BLD AUTO: 0.6 % — SIGNIFICANT CHANGE UP (ref 0–1)
BILIRUB SERPL-MCNC: 0.4 MG/DL — SIGNIFICANT CHANGE UP (ref 0.2–1.2)
BUN SERPL-MCNC: 11 MG/DL — SIGNIFICANT CHANGE UP (ref 10–20)
CALCIUM SERPL-MCNC: 9.9 MG/DL — SIGNIFICANT CHANGE UP (ref 8.4–10.4)
CHLORIDE SERPL-SCNC: 100 MMOL/L — SIGNIFICANT CHANGE UP (ref 98–110)
CO2 SERPL-SCNC: 22 MMOL/L — SIGNIFICANT CHANGE UP (ref 17–32)
CREAT SERPL-MCNC: 0.8 MG/DL — SIGNIFICANT CHANGE UP (ref 0.7–1.5)
EGFR: 107 ML/MIN/1.73M2 — SIGNIFICANT CHANGE UP
EOSINOPHIL # BLD AUTO: 0.11 K/UL — SIGNIFICANT CHANGE UP (ref 0–0.7)
EOSINOPHIL NFR BLD AUTO: 0.9 % — SIGNIFICANT CHANGE UP (ref 0–8)
GLUCOSE BLDC GLUCOMTR-MCNC: 125 MG/DL — HIGH (ref 70–99)
GLUCOSE BLDC GLUCOMTR-MCNC: 131 MG/DL — HIGH (ref 70–99)
GLUCOSE SERPL-MCNC: 125 MG/DL — HIGH (ref 70–99)
HCT VFR BLD CALC: 40.5 % — LOW (ref 42–52)
HGB BLD-MCNC: 13.1 G/DL — LOW (ref 14–18)
IMM GRANULOCYTES NFR BLD AUTO: 0.3 % — SIGNIFICANT CHANGE UP (ref 0.1–0.3)
LYMPHOCYTES # BLD AUTO: 29.3 % — SIGNIFICANT CHANGE UP (ref 20.5–51.1)
LYMPHOCYTES # BLD AUTO: 3.75 K/UL — HIGH (ref 1.2–3.4)
MAGNESIUM SERPL-MCNC: 2.2 MG/DL — SIGNIFICANT CHANGE UP (ref 1.8–2.4)
MCHC RBC-ENTMCNC: 29.3 PG — SIGNIFICANT CHANGE UP (ref 27–31)
MCHC RBC-ENTMCNC: 32.3 G/DL — SIGNIFICANT CHANGE UP (ref 32–37)
MCV RBC AUTO: 90.6 FL — SIGNIFICANT CHANGE UP (ref 80–94)
MONOCYTES # BLD AUTO: 1.29 K/UL — HIGH (ref 0.1–0.6)
MONOCYTES NFR BLD AUTO: 10.1 % — HIGH (ref 1.7–9.3)
NEUTROPHILS # BLD AUTO: 7.53 K/UL — HIGH (ref 1.4–6.5)
NEUTROPHILS NFR BLD AUTO: 58.8 % — SIGNIFICANT CHANGE UP (ref 42.2–75.2)
NRBC # BLD: 0 /100 WBCS — SIGNIFICANT CHANGE UP (ref 0–0)
PLATELET # BLD AUTO: 318 K/UL — SIGNIFICANT CHANGE UP (ref 130–400)
PMV BLD: 11 FL — HIGH (ref 7.4–10.4)
POTASSIUM SERPL-MCNC: 4.3 MMOL/L — SIGNIFICANT CHANGE UP (ref 3.5–5)
POTASSIUM SERPL-SCNC: 4.3 MMOL/L — SIGNIFICANT CHANGE UP (ref 3.5–5)
PROT SERPL-MCNC: 6.8 G/DL — SIGNIFICANT CHANGE UP (ref 6–8)
RBC # BLD: 4.47 M/UL — LOW (ref 4.7–6.1)
RBC # FLD: 15 % — HIGH (ref 11.5–14.5)
SODIUM SERPL-SCNC: 139 MMOL/L — SIGNIFICANT CHANGE UP (ref 135–146)
WBC # BLD: 12.8 K/UL — HIGH (ref 4.8–10.8)
WBC # FLD AUTO: 12.8 K/UL — HIGH (ref 4.8–10.8)

## 2024-04-16 PROCEDURE — 99222 1ST HOSP IP/OBS MODERATE 55: CPT

## 2024-04-16 PROCEDURE — 99239 HOSP IP/OBS DSCHRG MGMT >30: CPT

## 2024-04-16 RX ORDER — OXYCODONE AND ACETAMINOPHEN 5; 325 MG/1; MG/1
2 TABLET ORAL EVERY 6 HOURS
Refills: 0 | Status: DISCONTINUED | OUTPATIENT
Start: 2024-04-16 | End: 2024-04-16

## 2024-04-16 RX ORDER — OXYCODONE HYDROCHLORIDE 5 MG/1
20 TABLET ORAL EVERY 12 HOURS
Refills: 0 | Status: DISCONTINUED | OUTPATIENT
Start: 2024-04-16 | End: 2024-04-16

## 2024-04-16 RX ORDER — ISOPROPYL ALCOHOL, BENZOCAINE .7; .06 ML/ML; ML/ML
0 SWAB TOPICAL
Qty: 100 | Refills: 1
Start: 2024-04-16

## 2024-04-16 RX ORDER — LOSARTAN POTASSIUM 100 MG/1
1 TABLET, FILM COATED ORAL
Qty: 30 | Refills: 0
Start: 2024-04-16 | End: 2024-05-15

## 2024-04-16 RX ORDER — LOSARTAN POTASSIUM 100 MG/1
1 TABLET, FILM COATED ORAL
Qty: 90 | Refills: 0
Start: 2024-04-16 | End: 2024-07-14

## 2024-04-16 RX ORDER — METFORMIN HYDROCHLORIDE 850 MG/1
1 TABLET ORAL
Qty: 180 | Refills: 0
Start: 2024-04-16 | End: 2024-07-14

## 2024-04-16 RX ADMIN — OXYCODONE HYDROCHLORIDE 10 MILLIGRAM(S): 5 TABLET ORAL at 05:00

## 2024-04-16 RX ADMIN — Medication 3 CAPSULE(S): at 09:14

## 2024-04-16 RX ADMIN — Medication 3 CAPSULE(S): at 11:38

## 2024-04-16 RX ADMIN — PANTOPRAZOLE SODIUM 40 MILLIGRAM(S): 20 TABLET, DELAYED RELEASE ORAL at 05:00

## 2024-04-16 RX ADMIN — OXYCODONE HYDROCHLORIDE 10 MILLIGRAM(S): 5 TABLET ORAL at 05:49

## 2024-04-16 RX ADMIN — CHLORHEXIDINE GLUCONATE 1 APPLICATION(S): 213 SOLUTION TOPICAL at 11:38

## 2024-04-16 RX ADMIN — LOSARTAN POTASSIUM 25 MILLIGRAM(S): 100 TABLET, FILM COATED ORAL at 05:01

## 2024-04-16 NOTE — DISCHARGE NOTE PROVIDER - HOSPITAL COURSE
49M PMHx chronic pancreatitis s/p pancreatic duct, CBD stent exchange in 1/2023 presenting with abdominal pain, found to have acute on chronic pancreatitis, now s/p ERCP.    #Abdominal pain likely 2/2 acute on chronic pancreatitis  - lipase 63  - CTAP 4/9/24 showed acute on chronic pancreatitis with stable pancreatic heterogeneity, stable mild pancreatic duct dilatation in the tail of the pancreas, but increasing calcifications, no definite evidence of pancreatic mass, new attenuation of the distal splenic vein, 4 pancreaticoduodenal stents  - Last CBD and PD exchange in 1/2024  - s/p morphine and NS in ED  - s/p NS @ 150 cc/hr, now on LR@125cc/hr  - s/p ERCP 4/15 with PD, cbd stent removal; plastic cbd stent placed  - appreciate GI eval - f/u cytology results, f/u in office in 2-3 weeks  - pain control with morphine and oxycontin 20mg BID  - creon 12,000 3 tab 5x daily  - diet advanced from CLD to carb consistent      #New onset DM2  #Hyperglycemia in setting of chronic pancreatitis  - A1c 6.5  - ISS in hospital  - offered diabetic education in the hospital  - continued pregabalin      Patient tolerated solid diabetic diet this morning and is now stable for discharge.*** 49M PMHx chronic pancreatitis s/p pancreatic duct, CBD stent exchange in 1/2023 presenting with abdominal pain, found to have acute on chronic pancreatitis, now s/p ERCP.    #Abdominal pain likely 2/2 acute on chronic pancreatitis  - lipase 63  - CTAP 4/9/24 showed acute on chronic pancreatitis with stable pancreatic heterogeneity, stable mild pancreatic duct dilatation in the tail of the pancreas, but increasing calcifications, no definite evidence of pancreatic mass, new attenuation of the distal splenic vein, 4 pancreaticoduodenal stents  - Last CBD and PD exchange in 1/2024  - s/p morphine and NS in ED  - s/p NS @ 150 cc/hr, now on LR@125cc/hr  - s/p ERCP 4/15 with PD, cbd stent removal; plastic cbd stent placed  - appreciate GI eval - f/u cytology results, f/u in office in 2-3 weeks  - pain control with morphine and oxycontin 20mg BID  - creon 12,000 3 tab 5x daily  - diet advanced from CLD to carb consistent  - pt requesting to see endocrine prior to discharge      #New onset DM2  #Hyperglycemia in setting of chronic pancreatitis  - A1c 6.5  - ISS in hospital  - will discharge on metformin 500mg BID  - offered diabetic education in the hospital  - continued pregabalin      #HTN  - started on losartan 25mg daily here, continue on discharge      Patient tolerated solid diabetic diet this morning and is now stable for discharge.*** 49M PMHx chronic pancreatitis s/p pancreatic duct, CBD stent exchange in 1/2023 presenting with abdominal pain, found to have acute on chronic pancreatitis, now s/p ERCP.    #Abdominal pain likely 2/2 acute on chronic pancreatitis  - lipase 63  - CTAP 4/9/24 showed acute on chronic pancreatitis with stable pancreatic heterogeneity, stable mild pancreatic duct dilatation in the tail of the pancreas, but increasing calcifications, no definite evidence of pancreatic mass, new attenuation of the distal splenic vein, 4 pancreaticoduodenal stents  - Last CBD and PD exchange in 1/2024  - s/p morphine and NS in ED  - s/p NS @ 150 cc/hr, now on LR@125cc/hr  - s/p ERCP 4/15 with PD, cbd stent removal; plastic cbd stent placed  - appreciate GI eval - f/u cytology results, f/u in office in 2-3 weeks  - pain control with morphine and oxycontin 20mg BID  - creon 12,000 3 tab 5x daily  - diet advanced from CLD to carb consistent      #New onset DM2  #Hyperglycemia in setting of chronic pancreatitis  - A1c 6.5  - ISS in hospital  - will discharge on metformin 500mg BID  - offered diabetic education in the hospital and recommended following up with endocrine outpatient.  - continued pregabalin      #HTN  - started on losartan 25mg daily here, continue on discharge      Patient tolerated solid diabetic diet this morning and is now stable for discharge.

## 2024-04-16 NOTE — DISCHARGE NOTE PROVIDER - NSDCFUSCHEDAPPT_GEN_ALL_CORE_FT
Hipolito Post  [facility] PreAdmits  Scheduled Appointment: 04/22/2024    Peter Ansari  [facility] Phys[facility]  GENSURG [address]  Scheduled Appointment: 04/30/2024    Unknown, Doctor  [facility] PreAdmits  Scheduled Appointment: 05/07/2024    [facility] Phys[facility]  CATSCAN  [address]  Scheduled Appointment: 05/07/2024    [facility]  CATSCAN  [address]  Scheduled Appointment: 05/07/2024    Hipolito Post  [facility] Phys[facility]  GASTRO Doc Off [id number]  Scheduled Appointment: 05/15/2024     Hipolito Post  [facility] PreAdmits  Scheduled Appointment: 04/22/2024    Tali Braun  [facility]  ENDOCRIN [address]  Scheduled Appointment: 04/26/2024    Peter Ansari  [facility]  GENSURG [address]  Scheduled Appointment: 04/30/2024    Unknown, Doctor  [facility] PreAdmits  Scheduled Appointment: 05/07/2024    [facility] Physician [facility]  CATSCAN  [address]  Scheduled Appointment: 05/07/2024    [facility] Physician [facility]  CATSCAN  [address]  Scheduled Appointment: 05/07/2024    Hipolito Post  [facility] Physician [facility]  GASTRO Doc Off [id number]  Scheduled Appointment: 05/15/2024

## 2024-04-16 NOTE — PROGRESS NOTE ADULT - ASSESSMENT
49-year-old male hx of chronic pancreatitis, with PD and CBD stents with a stent exchange in january 2024 presented to ER with diaphoresis, high blood pressure and epigastric pain. Patietn had a televisit with  in 2.24 and he was having mild pain, had avisit with  10 days ago, CTAP was ordered revealing acute on chronic pancreatitis. Patient currently tolerating regular food, pain improved since ER admission.  GI consulted for further evaluation    # Acute on chronic pancreatitis  # Known history of large impacted PD stone   - hemodynamically stable, exam is bengin  - s/p ERCP:A snare was used to remove the previously placed PD stents and CBD stent. s/p cholangiogram and plastic CBD stent was placed with excellent drainage. PD was cannulated, , Pancreaticoogram revealed a filling defect in the proximal PD consistent with previously known PD stone with dilated PD. Brush of PD was performed for cytology. Over guidewire, a 7 Fr x 9 cm pigtail stent was placed in PD. Over another guidewire a second 7 Fr x9 cm pigtail was placed in the PD.    recommendations:  Advance diet as tolerated   Watch for post ERCP pancreatitis.  Follow up in office in 2 to 3 weeks with Dr. Post and Dr. Ansari   Await cytology results.   Possible lithotripsy in future.    recall as needed  discussed with patient and primary team 
49M PMHx chronic pancreatitis s/p pancreatic duct, CBD stent here with abdominal pain, found to have acute on chronic pancreatitis, now s/p ercp.    
49M PMHx chronic pancreatitis s/p pancreatic duct, CBD stent here with abdominal pain, found to have acute on chronic pancreatitis, now s/p ercp.

## 2024-04-16 NOTE — PROGRESS NOTE ADULT - ATTENDING COMMENTS
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   sx improved. tolerating diet.  f/u cytology of PD brushings.   he will need outpt f/u w/ Dr. Post and Dr. Ansari.   rest of recs per the note above.

## 2024-04-16 NOTE — PROGRESS NOTE ADULT - SUBJECTIVE AND OBJECTIVE BOX
Gastroenterology progress note:     Patient is a 49y old  Male who presents with a chief complaint of abdominal pain     Admitted on: 04-13-24    We are following the patient for: abdominal pain         Interval History:    No acute events overnight.   s/p ERCP with CBD, PD stents exchange  tolerating clears  no abdominal pain       PAST MEDICAL & SURGICAL HISTORY:  Pancreatitis      Back pain  buldging and herniated discs      Lumbar herniated disc      Bulging lumbar disc      S/P appendectomy      History of ERCP  with pancreatic stent placement          MEDICATIONS  (STANDING):  chlorhexidine 2% Cloths 1 Application(s) Topical daily  dextrose 10% Bolus 125 milliLiter(s) IV Bolus once  dextrose 5%. 1000 milliLiter(s) (50 mL/Hr) IV Continuous <Continuous>  dextrose 5%. 1000 milliLiter(s) (100 mL/Hr) IV Continuous <Continuous>  dextrose 50% Injectable 25 Gram(s) IV Push once  dextrose 50% Injectable 12.5 Gram(s) IV Push once  glucagon  Injectable 1 milliGRAM(s) IntraMuscular once  heparin   Injectable 5000 Unit(s) SubCutaneous every 8 hours  insulin lispro (ADMELOG) corrective regimen sliding scale   SubCutaneous three times a day before meals  labetalol Injectable 10 milliGRAM(s) IV Push once  lactated ringers. 1000 milliLiter(s) (125 mL/Hr) IV Continuous <Continuous>  losartan 25 milliGRAM(s) Oral daily  pancrelipase  (CREON 12,000 Lipase Units) 3 Capsule(s) Oral <User Schedule>  pantoprazole    Tablet 40 milliGRAM(s) Oral before breakfast  pregabalin 75 milliGRAM(s) Oral two times a day    MEDICATIONS  (PRN):  dextrose Oral Gel 15 Gram(s) Oral once PRN Blood Glucose LESS THAN 70 milliGRAM(s)/deciliter  morphine  - Injectable 2 milliGRAM(s) IV Push every 4 hours PRN Severe Pain (7 - 10)  oxyCODONE    IR 10 milliGRAM(s) Oral every 6 hours PRN Severe Pain (7 - 10)      Allergies  No Known Allergies      Review of Systems:   Cardiovascular:  No Chest Pain, No Palpitations  Respiratory:  No Cough, No Dyspnea  Gastrointestinal:  As described in HPI  Skin:  No Skin Lesions, No Jaundice  Neuro:  No Syncope, No Dizziness    Physical Examination:  T(C): 36.1 (04-16-24 @ 07:52), Max: 37 (04-15-24 @ 16:51)  HR: 67 (04-16-24 @ 07:52) (60 - 83)  BP: 163/86 (04-16-24 @ 07:52) (117/75 - 199/122)  RR: 18 (04-16-24 @ 07:52) (15 - 19)  SpO2: 98% (04-16-24 @ 04:53) (95% - 99%)  Weight (kg): 70.3 (04-15-24 @ 11:34)      GENERAL: AAOx3, no acute distress.  HEAD:  Atraumatic, Normocephalic  EYES: conjunctiva and sclera clear  NECK: Supple, no JVD or thyromegaly  CHEST/LUNG: Clear to auscultation bilaterally; No wheeze, rhonchi, or rales  HEART: Regular rate and rhythm; normal S1, S2, No murmurs.  ABDOMEN: Soft, nontender, nondistended; Bowel sounds present  NEUROLOGY: No asterixis or tremor.   SKIN: Intact, no jaundice     Data:                        12.9   9.59  )-----------( 317      ( 14 Apr 2024 20:56 )             39.6     Hgb trend:  12.9  04-14-24 @ 20:56  12.6  04-14-24 @ 05:58  13.5  04-13-24 @ 21:00      04-14    140  |  102  |  15  ----------------------------<  129<H>  5.1<H>   |  24  |  1.0    Ca    10.2      14 Apr 2024 20:56  Mg     2.3     04-14    TPro  7.2  /  Alb  4.6  /  TBili  <0.2  /  DBili  x   /  AST  16  /  ALT  13  /  AlkPhos  84  04-14    Liver panel trend:  TBili <0.2   /   AST 16   /   ALT 13   /   AlkP 84   /   Tptn 7.2   /   Alb 4.6    /   DBili --      04-14  TBili 0.3   /   AST 15   /   ALT 12   /   AlkP 77   /   Tptn 6.5   /   Alb 4.1    /   DBili --      04-14  TBili <0.2   /   AST 31   /   ALT 15   /   AlkP 87   /   Tptn 7.0   /   Alb 4.5    /   DBili <0.2      04-13      PT/INR - ( 14 Apr 2024 20:56 )   PT: 11.40 sec;   INR: 1.00 ratio         PTT - ( 14 Apr 2024 20:56 )  PTT:33.6 sec       
INTERVAL HPI/OVERNIGHT EVENTS:    SUBJECTIVE: Patient seen and examined at bedside.     no cp, sob, abd pain, fever  no abd pain, nausea, vomiting, diarrhea    OBJECTIVE:    VITAL SIGNS:  Vital Signs Last 24 Hrs  T(C): 36.1 (16 Apr 2024 07:52), Max: 37 (15 Apr 2024 16:51)  T(F): 96.9 (16 Apr 2024 07:52), Max: 98.6 (15 Apr 2024 16:51)  HR: 67 (16 Apr 2024 07:52) (60 - 83)  BP: 163/86 (16 Apr 2024 07:52) (117/75 - 199/122)  BP(mean): --  RR: 18 (16 Apr 2024 07:52) (15 - 19)  SpO2: 98% (16 Apr 2024 04:53) (95% - 99%)    Parameters below as of 16 Apr 2024 04:53  Patient On (Oxygen Delivery Method): room air          PHYSICAL EXAM:    General: NAD  HEENT: NC/AT; PERRL, clear conjunctiva  Neck: supple  Respiratory: CTA b/l  Cardiovascular: +S1/S2; RRR  Abdomen: soft, NT/ND; +BS x4  Extremities: WWP, 2+ peripheral pulses b/l; no LE edema  Skin: normal color and turgor; no rash  Neurological:    MEDICATIONS:  MEDICATIONS  (STANDING):  chlorhexidine 2% Cloths 1 Application(s) Topical daily  dextrose 10% Bolus 125 milliLiter(s) IV Bolus once  dextrose 5%. 1000 milliLiter(s) (50 mL/Hr) IV Continuous <Continuous>  dextrose 5%. 1000 milliLiter(s) (100 mL/Hr) IV Continuous <Continuous>  dextrose 50% Injectable 25 Gram(s) IV Push once  dextrose 50% Injectable 12.5 Gram(s) IV Push once  glucagon  Injectable 1 milliGRAM(s) IntraMuscular once  heparin   Injectable 5000 Unit(s) SubCutaneous every 8 hours  insulin lispro (ADMELOG) corrective regimen sliding scale   SubCutaneous three times a day before meals  labetalol Injectable 10 milliGRAM(s) IV Push once  losartan 25 milliGRAM(s) Oral daily  oxyCODONE  ER Tablet 20 milliGRAM(s) Oral every 12 hours  pancrelipase  (CREON 12,000 Lipase Units) 3 Capsule(s) Oral <User Schedule>  pantoprazole    Tablet 40 milliGRAM(s) Oral before breakfast  pregabalin 75 milliGRAM(s) Oral two times a day    MEDICATIONS  (PRN):  dextrose Oral Gel 15 Gram(s) Oral once PRN Blood Glucose LESS THAN 70 milliGRAM(s)/deciliter  morphine  - Injectable 2 milliGRAM(s) IV Push every 4 hours PRN Severe Pain (7 - 10)      ALLERGIES:  Allergies    No Known Allergies    Intolerances        LABS:                        13.1   12.80 )-----------( 318      ( 16 Apr 2024 06:47 )             40.5     Hemoglobin: 13.1 g/dL (04-16 @ 06:47)  Hemoglobin: 12.9 g/dL (04-14 @ 20:56)  Hemoglobin: 12.6 g/dL (04-14 @ 05:58)  Hemoglobin: 13.5 g/dL (04-13 @ 21:00)    CBC Full  -  ( 16 Apr 2024 06:47 )  WBC Count : 12.80 K/uL  RBC Count : 4.47 M/uL  Hemoglobin : 13.1 g/dL  Hematocrit : 40.5 %  Platelet Count - Automated : 318 K/uL  Mean Cell Volume : 90.6 fL  Mean Cell Hemoglobin : 29.3 pg  Mean Cell Hemoglobin Concentration : 32.3 g/dL  Auto Neutrophil # : 7.53 K/uL  Auto Lymphocyte # : 3.75 K/uL  Auto Monocyte # : 1.29 K/uL  Auto Eosinophil # : 0.11 K/uL  Auto Basophil # : 0.08 K/uL  Auto Neutrophil % : 58.8 %  Auto Lymphocyte % : 29.3 %  Auto Monocyte % : 10.1 %  Auto Eosinophil % : 0.9 %  Auto Basophil % : 0.6 %    04-16    139  |  100  |  11  ----------------------------<  125<H>  4.3   |  22  |  0.8    Ca    9.9      16 Apr 2024 06:47  Mg     2.2     04-16    TPro  6.8  /  Alb  4.3  /  TBili  0.4  /  DBili  x   /  AST  15  /  ALT  14  /  AlkPhos  81  04-16    Creatinine Trend: 0.8<--, 1.0<--, 0.7<--, 1.1<--  LIVER FUNCTIONS - ( 16 Apr 2024 06:47 )  Alb: 4.3 g/dL / Pro: 6.8 g/dL / ALK PHOS: 81 U/L / ALT: 14 U/L / AST: 15 U/L / GGT: x           PT/INR - ( 14 Apr 2024 20:56 )   PT: 11.40 sec;   INR: 1.00 ratio         PTT - ( 14 Apr 2024 20:56 )  PTT:33.6 sec    hs Troponin:            Urinalysis Basic - ( 16 Apr 2024 06:47 )    Color: x / Appearance: x / SG: x / pH: x  Gluc: 125 mg/dL / Ketone: x  / Bili: x / Urobili: x   Blood: x / Protein: x / Nitrite: x   Leuk Esterase: x / RBC: x / WBC x   Sq Epi: x / Non Sq Epi: x / Bacteria: x      CSF:                      EKG:   MICROBIOLOGY:    IMAGING:      Labs, imaging, EKG personally reviewed    RADIOLOGY & ADDITIONAL TESTS: Reviewed.
INTERVAL HPI/OVERNIGHT EVENTS:    SUBJECTIVE: Patient seen and examined at bedside.     no cp, sob, abd pain, fever  no abd pain, nausea, vomiting, diarrhea    OBJECTIVE:    VITAL SIGNS:  Vital Signs Last 24 Hrs  T(C): 36.2 (15 Apr 2024 13:18), Max: 37 (14 Apr 2024 16:19)  T(F): 97.2 (15 Apr 2024 13:18), Max: 98.6 (14 Apr 2024 16:19)  HR: 82 (15 Apr 2024 14:33) (60 - 82)  BP: 167/103 (15 Apr 2024 14:33) (123/81 - 171/96)  BP(mean): --  RR: 19 (15 Apr 2024 14:33) (16 - 19)  SpO2: 96% (15 Apr 2024 14:33) (95% - 100%)    Parameters below as of 15 Apr 2024 13:18  Patient On (Oxygen Delivery Method): room air          PHYSICAL EXAM:    General: NAD  HEENT: NC/AT; PERRL, clear conjunctiva  Neck: supple  Respiratory: CTA b/l  Cardiovascular: +S1/S2; RRR  Abdomen: soft, NT/ND; +BS x4  Extremities: WWP, 2+ peripheral pulses b/l; no LE edema  Skin: normal color and turgor; no rash  Neurological:    MEDICATIONS:  MEDICATIONS  (STANDING):  chlorhexidine 2% Cloths 1 Application(s) Topical daily  dextrose 10% Bolus 125 milliLiter(s) IV Bolus once  dextrose 5%. 1000 milliLiter(s) (100 mL/Hr) IV Continuous <Continuous>  dextrose 5%. 1000 milliLiter(s) (50 mL/Hr) IV Continuous <Continuous>  dextrose 50% Injectable 25 Gram(s) IV Push once  dextrose 50% Injectable 12.5 Gram(s) IV Push once  glucagon  Injectable 1 milliGRAM(s) IntraMuscular once  heparin   Injectable 5000 Unit(s) SubCutaneous every 8 hours  insulin lispro (ADMELOG) corrective regimen sliding scale   SubCutaneous three times a day before meals  labetalol Injectable 10 milliGRAM(s) IV Push once  lactated ringers. 1000 milliLiter(s) (125 mL/Hr) IV Continuous <Continuous>  losartan 25 milliGRAM(s) Oral daily  oxyCODONE    IR 10 milliGRAM(s) Oral once  pancrelipase  (CREON 12,000 Lipase Units) 3 Capsule(s) Oral <User Schedule>  pantoprazole    Tablet 40 milliGRAM(s) Oral before breakfast  pregabalin 75 milliGRAM(s) Oral two times a day    MEDICATIONS  (PRN):  dextrose Oral Gel 15 Gram(s) Oral once PRN Blood Glucose LESS THAN 70 milliGRAM(s)/deciliter  morphine  - Injectable 2 milliGRAM(s) IV Push every 4 hours PRN Severe Pain (7 - 10)  oxyCODONE    IR 10 milliGRAM(s) Oral every 6 hours PRN Severe Pain (7 - 10)      ALLERGIES:  Allergies    No Known Allergies    Intolerances        LABS:                        12.9   9.59  )-----------( 317      ( 14 Apr 2024 20:56 )             39.6     Hemoglobin: 12.9 g/dL (04-14 @ 20:56)  Hemoglobin: 12.6 g/dL (04-14 @ 05:58)  Hemoglobin: 13.5 g/dL (04-13 @ 21:00)    CBC Full  -  ( 14 Apr 2024 20:56 )  WBC Count : 9.59 K/uL  RBC Count : 4.40 M/uL  Hemoglobin : 12.9 g/dL  Hematocrit : 39.6 %  Platelet Count - Automated : 317 K/uL  Mean Cell Volume : 90.0 fL  Mean Cell Hemoglobin : 29.3 pg  Mean Cell Hemoglobin Concentration : 32.6 g/dL  Auto Neutrophil # : x  Auto Lymphocyte # : x  Auto Monocyte # : x  Auto Eosinophil # : x  Auto Basophil # : x  Auto Neutrophil % : x  Auto Lymphocyte % : x  Auto Monocyte % : x  Auto Eosinophil % : x  Auto Basophil % : x    04-14    140  |  102  |  15  ----------------------------<  129<H>  5.1<H>   |  24  |  1.0    Ca    10.2      14 Apr 2024 20:56  Phos  3.5     04-14  Mg     2.3     04-14    TPro  7.2  /  Alb  4.6  /  TBili  <0.2  /  DBili  x   /  AST  16  /  ALT  13  /  AlkPhos  84  04-14    Creatinine Trend: 1.0<--, 0.7<--, 1.1<--  LIVER FUNCTIONS - ( 14 Apr 2024 20:56 )  Alb: 4.6 g/dL / Pro: 7.2 g/dL / ALK PHOS: 84 U/L / ALT: 13 U/L / AST: 16 U/L / GGT: x           PT/INR - ( 14 Apr 2024 20:56 )   PT: 11.40 sec;   INR: 1.00 ratio         PTT - ( 14 Apr 2024 20:56 )  PTT:33.6 sec    hs Troponin:                11 <<== 04-14-24 @ 05:58            Urinalysis Basic - ( 14 Apr 2024 20:56 )    Color: x / Appearance: x / SG: x / pH: x  Gluc: 129 mg/dL / Ketone: x  / Bili: x / Urobili: x   Blood: x / Protein: x / Nitrite: x   Leuk Esterase: x / RBC: x / WBC x   Sq Epi: x / Non Sq Epi: x / Bacteria: x      CSF:                      EKG:   MICROBIOLOGY:    IMAGING:      Labs, imaging, EKG personally reviewed    RADIOLOGY & ADDITIONAL TESTS: Reviewed.

## 2024-04-16 NOTE — CONSULT NOTE ADULT - ASSESSMENT
ASSESSMENT:  49M w/ PMH chronic pancreatitis, with PD and CBD stents with a stent exchange in January 2024 presents with abdominal pain. Patient saw Dr. Post who requested admission for possible intervention. Patient has chronic severe pain since he had the PD stents placed in january 2024. He was scheduled for stent exchange with Dr. Post on 04/22/2024. Patient today also had the same pain but was also tachycardic and had left sided subcostal chest pain for few minutes which resolved.. Of note, that the patient also saw Dr. Ansari for evaluation of cholecystectomy which was deemed not feasible and patient was being evaluated for whipple Sx.  The patient is admitted for possible acute on chronic pancreatitis    CTAP on 09/04/24 showed:   - Acute on chronic pancreatitis with stable pancreatic heterogeneity, stable mild pancreatic duct dilatation in the tail of the pancreas, but increasing calcifications.  - No definite evidence of pancreatic mass  - New attenuation of the distal splenic vein  - Multiple pancreaticoduodenal stents, appears to be 4, correlation with surgical history is recommended  - Heterogeneous arterial enhancement of the liver, felt to likely be perfusional in nature.     PLAN:  - no acute surgical intervention, cont w medical and endoscopic management of pain  - will follow  - possible whipple surgery to be discussed as outpt if medical therapy fails. Pt has appointment 4/30    Above plan discussed with Attending Surgeon Dr. Ansari  04-16-24 @ 01:12
49-year-old male hx of chronic pancreatitis, with PD and CBD stents with a stent exchange in january 2024 presented to ER with diaphoresis, high blood pressure and epigastric pain. Patietn had a televisit with  in 2.24 and he was having mild pain, had avisit with  10 days ago, CTAP was ordered revealing acute on chronic pancreatitis. Patient currently tolerating regular food, pain improved since ER admission. As per wife she reports hhis blood glucose was in 300s yesterday with BP>150/100 which prompted them to ER. GI consulted for further evaluation .    #Acute on chronic abdominal pain r/o PD stent blockage vs migration  - s/p multiple ERCP; last one in 1/24 with PD stent exchange and CBD [id number] stent  GI hx :  initially seen for dilated common bile duct and pancreatic duct on imaging, associated with symptomatic abdominal pain, postprandial pain and some weight loss, extensive work-up including ERCP and endoscopic ultrasound revealed a calcified impacted pancreatic duct stone at the pancreatic general/head, the stone is located right at the junction of the pancreatic neck/head. Patient had subsequent multiple ERCPs with bilious enterotomy, pancreatic sphincterotomy and pancreatic stent placement. Pancreatic stent placement usually alleviates his pain. He had multiple ERCPs with stent exchanges to obtain ideal stent diameter and stent length as those pancreatic stents frequently occluded. During 1 ERCP the stent had migrated almost out of the pancreatic duct. He also had brushing at the pancreatic duct that was revealing for possible atypia with some low-grade dysplasia. No evidence of adenocarcinoma. Endoscopic work-up with endoscopic ultrasound as well as MRI and CAT scan revealed no pancreatic masses or signs of adenocarcinoma. Last ERCP patient had 2 [id number] pancreatic stent placement to achieve better drainage of the pancreatic duct. He went for second opinion at [facility], and case discussed with the advanced endoscopist, the overall agreed with our management, only solution for permanent relief of his chronic pancreatitis will be a Whipple procedure. Seen by    - CTAP:4/9: acute on chronic pancreatitis  - labs on admission WNL, currently denies any pain, tolerating diet    RECS  - low fat diet  - recommend KUB for stent location  - Continue pancreatic enzyme supplements  - keep NPO after midnight for possible ERCP with spyglass in am  - adequate pain control  - trend LFts  - will follow

## 2024-04-16 NOTE — PROGRESS NOTE ADULT - PROBLEM SELECTOR PLAN 2
in setting of chronic pancreatitis  ssi  diabetic teaching  a1c 6.5  lyrica 75 bid in setting of chronic pancreatitis; dm type 3c  ssi  diabetic teaching  a1c 6.5  lyrica 75 bid  discharge on metformin 500 bid

## 2024-04-16 NOTE — PROGRESS NOTE ADULT - NSPROGADDITIONALINFOA_GEN_ALL_CORE
#Progress Note Handoff  Pending (specify): ivf, pain control; monitor for pancreatitis  Family discussion: d/w pt at bedside  Disposition: home .
#Progress Note Handoff  Pending (specify): ivf, pain control; monitor for pancreatitis; d/c planning  Family discussion: d/w pt at bedside  Disposition: home .

## 2024-04-16 NOTE — DISCHARGE NOTE NURSING/CASE MANAGEMENT/SOCIAL WORK - NSDCPEFALRISK_GEN_ALL_CORE
For information on Fall & Injury Prevention, visit: [email or web address] OR  [email or web address] OR  https://www.cdc.gov/steadi/patient.html

## 2024-04-16 NOTE — DISCHARGE NOTE NURSING/CASE MANAGEMENT/SOCIAL WORK - PATIENT PORTAL LINK FT
You can access the FollowMyHealth Patient Portal offered by [facility] by registering at the following website: [email or web address]. By joining [facility]’s FollowMyHealth portal, you will also be able to view your health information using other applications (apps) compatible with our system.

## 2024-04-16 NOTE — DISCHARGE NOTE PROVIDER - NSDCFUADDAPPT_GEN_ALL_CORE_FT
APPTS ARE READY TO BE MADE: [ x] YES      Additional Information about above appointments (if needed):    1: Endocrine with Dr. Tali Braun

## 2024-04-16 NOTE — DISCHARGE NOTE PROVIDER - PROVIDER TOKENS
PROVIDER:[TOKEN:[[id number]],FOLLOWUP:[2 weeks]] PROVIDER:[TOKEN:[[id number]],FOLLOWUP:[2 weeks]],PROVIDER:[TOKEN:[[id number]],FOLLOWUP:[2 weeks]]

## 2024-04-16 NOTE — PROGRESS NOTE ADULT - PROBLEM SELECTOR PLAN 1
due to acute on chronic pancreatitis  ct abd 4/9 with acute pancreatitis; stable pancreatic duct dilation  s/p ERCP 4/15 with PD, cbd stent removal; placement cbd stent  trial reg diet  pain control  creon 12,000 3 tab 5x daily  discharge planning; needs outpt pmd, gi, sx f/u outpt

## 2024-04-16 NOTE — DISCHARGE NOTE PROVIDER - CARE PROVIDER_API CALL
Naya Giraldo  Internal Medicine  [address] [place] [id number]  Phone: [phone]  Fax: [phone]  Follow Up Time: 2 weeks   Naya Giraldo  Internal Medicine  [address] [place] [id number]  Phone: [phone]  Fax: [phone]  Follow Up Time: 2 weeks    Tali Braun  Endocrinology/Metab/Diabetes  [address] [place] [address], [address]  [place] [address]  Phone: [phone]  Fax: [phone]  Follow Up Time: 2 weeks

## 2024-04-16 NOTE — CONSULT NOTE ADULT - SUBJECTIVE AND OBJECTIVE BOX
GENERAL SURGERY CONSULT NOTE     HPI:  a 49 year old male with a hx of chronic pancreatitis, with PD and CBD stents with a stent exchange in january 2024 presents with abdominal pain. Patient saw Dr. Post who requested admission for possible intervention. Patient has chornic severe pain since he had the PD stents placed in january 2024. He was scheduled for stent exchange with Dr. Post on 04/22/2024. Patient today also had the same pain but was also tachycardic and had left sided subcostal chest pain for few minutes which resolved.. To note that the patient also saw Dr. Ansari for evaluation of cholecystectomy which was deemed not feasible and patient was being evaluated for whipple Sx.  The patient is admitted for possible acute on chronic pancreatitis    Labs signifiacant for K+ 5.7 ( hemolyzed), lipase 63     CTAP on 09/04/24 showed:   - Acute on chronic pancreatitis with stable pancreatic heterogeneity, stable mild pancreatic duct dilatation in the tail of the pancreas, but increasing calcifications.  - No definite evidence of pancreatic mass  - New attenuation of the distal splenic vein  - Multiple pancreaticoduodenal stents, appears to be 4, correlation with surgical history is recommended  - Heterogeneous arterial enhancement of the liver, felt to likely be perfusional in nature.   - However, outpatient MRI liver with gadolinium is recommended for further characterization.    CT chest showed:   - Mild emphysematous changes.  - No suspicious pulmonary parenchymal lesions    Vital Signs Last 24 Hrs  T(C): 37 (14 Apr 2024 00:30), Max: 37 (14 Apr 2024 00:30)  T(F): 98.6 (14 Apr 2024 00:30), Max: 98.6 (14 Apr 2024 00:30)  HR: 66 (14 Apr 2024 00:30) (66 - 73)  BP: 133/96 (14 Apr 2024 00:30) (133/96 - 168/95)  RR: 18 (14 Apr 2024 00:30) (18 - 18)  SpO2: 100% (14 Apr 2024 00:30) (97% - 100%)  Patient On (Oxygen Delivery Method): room air   (14 Apr 2024 00:26)      PAST MEDICAL & SURGICAL HISTORY:  Pancreatitis  Back pain  buldging and herniated discs  Lumbar herniated disc  Bulging lumbar disc  S/P appendectomy  History of ERCP  with pancreatic stent placement      Home Medications:  Creon 36,000 units oral delayed release capsule: 1 cap(s) orally 8 times a day (14 Apr 2024 01:48)  ibuprofen 600 mg oral tablet: 1 tab(s) orally prn (29 Jan 2024 12:19)  Lyrica 75 mg oral capsule: 1 cap(s) orally 2 times a day (14 Apr 2024 01:48)  oxyCODONE 10 mg oral tablet: 1 tab(s) orally 4 times a day (14 Apr 2024 01:48)    VITALS:  T(F): 98.1 (04-15-24 @ 22:57), Max: 98.6 (04-15-24 @ 16:51)  HR: 80 (04-15-24 @ 22:57) (60 - 83)  BP: 117/75 (04-15-24 @ 22:57) (117/75 - 199/122)  RR: 18 (04-15-24 @ 22:57) (15 - 19)  SpO2: 96% (04-15-24 @ 15:33) (95% - 100%)    PHYSICAL EXAM:  General Appearance: NAD  HEENT: Normocephalic, atraumatic, trachea midline  Heart: s1, s2,    Lungs: No increased work of breathing or accessory muscle use. Symmetric chest wall rise and fall. Bilateral breath sounds  Abdomen:  Soft, nontender, nondistended. No rebound or guarding.  MSK/Extremities: Warm & well-perfused.   Skin: Warm, dry. No jaundice.     MEDICATIONS  (STANDING):  chlorhexidine 2% Cloths 1 Application(s) Topical daily  dextrose 10% Bolus 125 milliLiter(s) IV Bolus once  dextrose 5%. 1000 milliLiter(s) (50 mL/Hr) IV Continuous <Continuous>  dextrose 5%. 1000 milliLiter(s) (100 mL/Hr) IV Continuous <Continuous>  dextrose 50% Injectable 12.5 Gram(s) IV Push once  dextrose 50% Injectable 25 Gram(s) IV Push once  glucagon  Injectable 1 milliGRAM(s) IntraMuscular once  heparin   Injectable 5000 Unit(s) SubCutaneous every 8 hours  insulin lispro (ADMELOG) corrective regimen sliding scale   SubCutaneous three times a day before meals  labetalol Injectable 10 milliGRAM(s) IV Push once  lactated ringers. 1000 milliLiter(s) (125 mL/Hr) IV Continuous <Continuous>  losartan 25 milliGRAM(s) Oral daily  pancrelipase  (CREON 12,000 Lipase Units) 3 Capsule(s) Oral <User Schedule>  pantoprazole    Tablet 40 milliGRAM(s) Oral before breakfast  pregabalin 75 milliGRAM(s) Oral two times a day    MEDICATIONS  (PRN):  dextrose Oral Gel 15 Gram(s) Oral once PRN Blood Glucose LESS THAN 70 milliGRAM(s)/deciliter  morphine  - Injectable 2 milliGRAM(s) IV Push every 4 hours PRN Severe Pain (7 - 10)  oxyCODONE    IR 10 milliGRAM(s) Oral every 6 hours PRN Severe Pain (7 - 10)      LAB/STUDIES:                      12.9   9.59  )-----------( 317      ( 14 Apr 2024 20:56 )             39.6     04-14  140  |  102  |  15  ----------------------------<  129<H>  5.1<H>   |  24  |  1.0    Ca    10.2      14 Apr 2024 20:56  Phos  3.5     04-14  Mg     2.3     04-14    TPro  7.2  /  Alb  4.6  /  TBili  <0.2  /  DBili  x   /  AST  16  /  ALT  13  /  AlkPhos  84  04-14    PT/INR - ( 14 Apr 2024 20:56 )   PT: 11.40 sec;   INR: 1.00 ratio       PTT - ( 14 Apr 2024 20:56 )  PTT:33.6 sec  LIVER FUNCTIONS - ( 14 Apr 2024 20:56 )  Alb: 4.6 g/dL / Pro: 7.2 g/dL / ALK PHOS: 84 U/L / ALT: 13 U/L / AST: 16 U/L / GGT: x           Urinalysis Basic - ( 14 Apr 2024 20:56 )    Color: x / Appearance: x / SG: x / pH: x  Gluc: 129 mg/dL / Ketone: x  / Bili: x / Urobili: x   Blood: x / Protein: x / Nitrite: x   Leuk Esterase: x / RBC: x / WBC x   Sq Epi: x / Non Sq Epi: x / Bacteria: x      CARDIAC MARKERS ( 14 Apr 2024 05:58 )  x     / x     / 60 U/L / x     / 1.3 ng/m        IMAGING:  < from: CT Abdomen and Pelvis w/ IV Cont (04.09.24 @ 10:42) >  LOWER CHEST: There is minimal atelectasis..    HEPATOBILIARY: There is heterogeneous enhancement of the liver on   arterial phase imaging, felt to likely be perfusional in nature. This can   be better characterized with outpatient hepatic MRI with gadolinium.   There is pneumobilia and air in the gallbladder..    SPLEEN: Unremarkable..    PANCREAS: There appears to be multiple pancreatic-duodenal stents,   possibly 4. Correlation with surgical history is recommended. There is   diffuse pancreatic heterogeneity, similar to prior examination. There are   diffuse pancreatic calcifications, increased since prior examination,   consistent chronic pancreatitis. There are infiltrative changes around   the pancreatic tail and above findings in conjunction with these findings   are consistent with acute on chronic pancreatitis. Mild pancreatic duct   dilatation within the tail of the pancreas is essentially stable since   prior examination. There is no definite evidence of mass lesion on this   examination..    ADRENAL GLANDS: Nodular thickening of the left adrenal gland. The right   adrenal gland is unremarkable..    KIDNEYS: Bilateral renal cysts and subcentimeter hypodensities, too small   to characterize.. No hydronephrosis..    ABDOMINOPELVIC NODES: There are stable subcentimeter retroperitoneal   lymph nodes...    PELVIC ORGANS: Excreted contrast within a collapsed bladder..    PERITONEUM/MESENTERY/BOWEL: There is no free air or obstruction..    BONES/SOFT TISSUES: Degenerative change...    OTHER: Vascular calcifications. The celiac artery, superior mesenteric   artery, inferior mesenteric artery and renal arteries are patent. The   distal splenic vein is attenuated, new since prior examination...    IMPRESSION:    Acute on chronic pancreatitis with stable pancreatic heterogeneity,   stable mild pancreatic duct dilatation in the tail of the pancreas, but   increasing calcifications.    No definite evidence of pancreatic mass    New attenuation of the distal splenic vein    Multiple pancreaticoduodenal stents, appears to be 4, correlation with   surgical history is recommended    Heterogeneous arterial enhancement of the liver, felt to likely be   perfusional in nature. However, outpatient MRI liver with gadolinium is   recommended for further characterization.    < end of copied text >

## 2024-04-16 NOTE — DISCHARGE NOTE PROVIDER - NSDCCPCAREPLAN_GEN_ALL_CORE_FT
PRINCIPAL DISCHARGE DIAGNOSIS  Diagnosis: Acute pancreatitis  Assessment and Plan of Treatment: You came in with complaints of abdominal pain and an outpatient CTAP showed that you had acute on chronic pancreatitis. In the hospital you underwent a repeat ERCP where the previous stents in your biliary tract were removed and a new stent was placed. As you are no longer having abdominal pain, are tolerating solid food, you are stable for discharge. Please follow-up with Dr martin and Dr. Ansari outpatient.      SECONDARY DISCHARGE DIAGNOSES  Diagnosis: Diabetes mellitus, new onset  Assessment and Plan of Treatment: In the hospital your blood sugar was elevated and we performed an A1C, which was elevated and showed that you have new-onsent diabetes. Please follow-up with your PCP for further management and for referral to a diabetic specialist.     PRINCIPAL DISCHARGE DIAGNOSIS  Diagnosis: Acute pancreatitis  Assessment and Plan of Treatment: You came in with complaints of abdominal pain and an outpatient CTAP showed that you had acute on chronic pancreatitis. In the hospital you underwent a repeat ERCP where the previous stents in your biliary tract were removed and a new stent was placed. As you are no longer having abdominal pain, are tolerating solid food, you are stable for discharge. Please follow-up with Dr martin and Dr. Ansari outpatient.      SECONDARY DISCHARGE DIAGNOSES  Diagnosis: Diabetes mellitus, new onset  Assessment and Plan of Treatment: You also reported that your blood sugar was high when measured by EMS, in the hospital a test called HbA1c was performed which is an indicator of your blood sugar levels for the last 3 months. Your A1c was found to be a 6.5 and at this level, we diagnose you with new onset diabetes. In the hospital, generally we provide insulin based on blood sugar levels around mealtime to blood sugar control. You will also be prescribed metformin 500mg twice daily to help control your blood sugar levels. Please monitor for signs of hypoglycemia such as sweating, dizziness, confusion or shaking. Please check your blood sugar at this time with the glucometer and drink anything sweet, preferably juice. Please make sure to follow up with your PCP as well as the endocrinologist outpatient for further management of your new diabetes.

## 2024-04-16 NOTE — DISCHARGE NOTE PROVIDER - NSDCMRMEDTOKEN_GEN_ALL_CORE_FT
Creon 36,000 units oral delayed release capsule: 1 cap(s) orally 8 times a day  ibuprofen 600 mg oral tablet: 1 tab(s) orally prn  losartan 25 mg oral tablet: 1 tab(s) orally once a day  Lyrica 75 mg oral capsule: 1 cap(s) orally 2 times a day  oxyCODONE 10 mg oral tablet: 1 tab(s) orally 4 times a day   alcohol swabs: Apply topically to affected area 4 times a day  Creon 36,000 units oral delayed release capsule: 1 cap(s) orally 8 times a day  glucometer (per patient&#x27;s insurance): Test blood sugars four times a day. Dispense #1 glucometer.  ibuprofen 600 mg oral tablet: 1 tab(s) orally prn  lancets: 1 application subcutaneously 4 times a day  losartan 25 mg oral tablet: 1 tab(s) orally once a day  Lyrica 75 mg oral capsule: 1 cap(s) orally 2 times a day  metFORMIN 500 mg oral tablet: 1 tab(s) orally 2 times a day  oxyCODONE 10 mg oral tablet: 1 tab(s) orally 4 times a day  test strips (per patient&#x27;s insurance): 1 application subcutaneously 4 times a day. ** Compatible with patient&#x27;s glucometer **

## 2024-04-17 ENCOUNTER — APPOINTMENT (OUTPATIENT)
Dept: GASTROENTEROLOGY | Facility: CLINIC | Age: 49
End: 2024-04-17
Payer: COMMERCIAL

## 2024-04-17 VITALS — BODY MASS INDEX: 29.19 KG/M2 | HEIGHT: 61 IN | WEIGHT: 154.6 LBS

## 2024-04-17 DIAGNOSIS — F17.200 NICOTINE DEPENDENCE, UNSPECIFIED, UNCOMPLICATED: ICD-10-CM

## 2024-04-17 DIAGNOSIS — Z78.9 OTHER SPECIFIED HEALTH STATUS: ICD-10-CM

## 2024-04-17 LAB
NON-GYNECOLOGICAL CYTOLOGY STUDY: SIGNIFICANT CHANGE UP
NON-GYNECOLOGICAL CYTOLOGY STUDY: SIGNIFICANT CHANGE UP

## 2024-04-17 PROCEDURE — 99213 OFFICE O/P EST LOW 20 MIN: CPT

## 2024-04-17 RX ORDER — LOSARTAN POTASSIUM 25 MG/1
25 TABLET, FILM COATED ORAL
Refills: 0 | Status: ACTIVE | COMMUNITY

## 2024-04-17 RX ORDER — METFORMIN ER 500 MG 500 MG/1
500 TABLET ORAL
Refills: 0 | Status: ACTIVE | COMMUNITY

## 2024-04-17 NOTE — PHYSICAL EXAM
[Alert] : alert [Normal Voice/Communication] : normal voice/communication [Healthy Appearing] : healthy appearing [No Acute Distress] : no acute distress [Sclera] : the sclera and conjunctiva were normal [Hearing Threshold Finger Rub Not [place]] : hearing was normal [Normal Lips/Gums] : the lips and gums were normal [Oropharynx] : the oropharynx was normal [Normal Appearance] : the appearance of the neck was normal [No Neck Mass] : no neck mass was observed [No Respiratory Distress] : no respiratory distress [No Acc Muscle Use] : no accessory muscle use [Respiration, Rhythm And Depth] : normal respiratory rhythm and effort [Auscultation Breath Sounds / Voice Sounds] : lungs were clear to auscultation bilaterally [Heart Rate And Rhythm] : heart rate was normal and rhythm regular [Normal S1, S2] : normal S1 and S2 [Murmurs] : no murmurs [Bowel Sounds] : normal bowel sounds [Abdomen Tenderness] : non-tender [No Masses] : no abdominal mass palpated [] : no hepatosplenomegaly [Abdomen Soft] : soft [Oriented To Time, Place, And Person] : oriented to person, place, and time

## 2024-04-17 NOTE — ASSESSMENT
[FreeTextEntry1] : Patient is a 49-year-old male who initially came in for a dilated common bile duct and pancreatic duct on imaging, associated with symptomatic abdominal pain, postprandial pain and some weight loss for f/U today post repeat ERCP on 4/14/2024 in which prior stents were removed and additional PD and CBD stents placed. Patient tolerated procedure well at the time. His case was discussed on tumor board along with options including surgical intervention.   Chronic Pancreatitis with PD stones with chronic abdominal pain Enlarged GB with slow motility -S/P ERCP with stent exchange in the PD and stent placement in the CBD -Doing well post procedure -Referral To Dr Elaine for lithotripsy of PD stones

## 2024-04-17 NOTE — REVIEW OF SYSTEMS
[Recent Weight Gain (___ Lbs)] : recent [unfilled] ~Ulb weight gain [As Noted in HPI] : as noted in HPI [Abdominal Pain] : abdominal pain [Negative] : Heme/Lymph [Fever] : no fever [Chills] : no chills [Feeling Poorly] : not feeling poorly [Feeling Tired] : not feeling tired [Recent Weight Loss (___ Lbs)] : no recent weight loss [Vomiting] : no vomiting [Constipation] : no constipation [Diarrhea] : no diarrhea [Heartburn] : no heartburn [Melena (black stool)] : no melena [Fecal Incontinence (soiling)] : no fecal incontinence [Swollen Glands] : no swollen glands

## 2024-04-17 NOTE — HISTORY OF PRESENT ILLNESS
[FreeTextEntry1] : Patient is a 49-year-old male who initially came in for a dilated common bile duct and pancreatic duct on imaging, associated with symptomatic abdominal pain, postprandial pain and some weight loss for f/U today post repeat ERCP on 4/14/2024 in which prior stents were removed and additional PD and CBD stents placed. Patient tolerated procedure well at the time. His case was discussed on tumor board along with options including surgical intervention. Dr Ansari followed him in hospital post ERCP.   [de-identified] : 4/15/24

## 2024-04-18 ENCOUNTER — TRANSCRIPTION ENCOUNTER (OUTPATIENT)
Age: 49
End: 2024-04-18

## 2024-04-23 ENCOUNTER — TRANSCRIPTION ENCOUNTER (OUTPATIENT)
Age: 49
End: 2024-04-23

## 2024-04-26 ENCOUNTER — APPOINTMENT (OUTPATIENT)
Dept: ENDOCRINOLOGY | Facility: CLINIC | Age: 49
End: 2024-04-26
Payer: COMMERCIAL

## 2024-04-26 VITALS
BODY MASS INDEX: 22.66 KG/M2 | WEIGHT: 153 LBS | DIASTOLIC BLOOD PRESSURE: 80 MMHG | SYSTOLIC BLOOD PRESSURE: 130 MMHG | HEIGHT: 69 IN | HEART RATE: 88 BPM | OXYGEN SATURATION: 97 %

## 2024-04-26 DIAGNOSIS — Z86.39 PERSONAL HISTORY OF OTHER ENDOCRINE, NUTRITIONAL AND METABOLIC DISEASE: ICD-10-CM

## 2024-04-26 DIAGNOSIS — Z86.79 PERSONAL HISTORY OF OTHER DISEASES OF THE CIRCULATORY SYSTEM: ICD-10-CM

## 2024-04-26 DIAGNOSIS — E11.9 TYPE 2 DIABETES MELLITUS W/OUT COMPLICATIONS: ICD-10-CM

## 2024-04-26 DIAGNOSIS — R63.4 ABNORMAL WEIGHT LOSS: ICD-10-CM

## 2024-04-26 PROCEDURE — 99204 OFFICE O/P NEW MOD 45 MIN: CPT

## 2024-04-26 NOTE — ASSESSMENT
[FreeTextEntry1] : Mr Rios is a 49 years old male patient with history of chronic pancreatitis s/p multiple CBD AND PD stents ( from stones ) who present or evaluation of newly diagnosed diabetes  #newly diagnosed diabetes - over past 2 years patient was diagnosed with acute on Chronic Pancreatitis with PD stones with chronic abdominal pain Enlarged GB with slow motility requiring multiple ERCP and stents - recently admitted to the hospital with same and Bg were found to be elevated - 4/2024 A1c 6.5% , used to follow prior regularly with PCP and no DM diagnosis - since diagnosis patient on metformin 500 mg daily , he did lose a lot of weight due to pancreas illness and did major modification to his diet - now FS acceptable log reviewed  - discussed at length with patient and wife possible progression to type 1 DM with chornic pancreatitis at this stage based on FS monitoring his Bg are ok with metformin and diet modification  - will continue same for now  - refer to nutritionist to help with low carb diet but also increase protein as he lost a lot of muscle mass  - prefer not to use sensor and will be onitoring FS at least BID  - IF BG > 200 for 24 hours advised to contact office

## 2024-04-26 NOTE — REVIEW OF SYSTEMS
[Fatigue] : fatigue [Recent Weight Loss (___ Lbs)] : recent weight loss: [unfilled] lbs [Blurred Vision] : blurred vision [Dysphagia] : no dysphagia [Dysphonia] : no dysphonia [As Noted in HPI] : as noted in HPI [All other systems negative] : All other systems negative

## 2024-04-26 NOTE — PHYSICAL EXAM
[Alert] : alert [No Acute Distress] : no acute distress [No Proptosis] : no proptosis [No Lid Lag] : no lid lag [Thyroid Not Enlarged] : the thyroid was not enlarged [No Thyroid Nodules] : no palpable thyroid nodules [No Respiratory Distress] : no respiratory distress [No Accessory Muscle Use] : no accessory muscle use [Clear to Auscultation] : lungs were clear to auscultation bilaterally [Normal S1, S2] : normal S1 and S2 [No Murmurs] : no murmurs [Regular Rhythm] : with a regular rhythm [No Edema] : no peripheral edema [Not Tender] : non-tender [Not Distended] : not distended [Soft] : abdomen soft [No Stigmata of Cushings Syndrome] : no stigmata of Cushings Syndrome [No Tremors] : no tremors [Oriented x3] : oriented to person, place, and time

## 2024-04-27 DIAGNOSIS — K85.90 ACUTE PANCREATITIS WITHOUT NECROSIS OR INFECTION, UNSPECIFIED: ICD-10-CM

## 2024-04-27 DIAGNOSIS — E11.65 TYPE 2 DIABETES MELLITUS WITH HYPERGLYCEMIA: ICD-10-CM

## 2024-04-27 DIAGNOSIS — M54.9 DORSALGIA, UNSPECIFIED: ICD-10-CM

## 2024-04-27 DIAGNOSIS — K86.89 OTHER SPECIFIED DISEASES OF PANCREAS: ICD-10-CM

## 2024-04-27 DIAGNOSIS — M51.26 OTHER INTERVERTEBRAL DISC DISPLACEMENT, LUMBAR REGION: ICD-10-CM

## 2024-04-27 DIAGNOSIS — K86.1 OTHER CHRONIC PANCREATITIS: ICD-10-CM

## 2024-04-29 ENCOUNTER — TRANSCRIPTION ENCOUNTER (OUTPATIENT)
Age: 49
End: 2024-04-29

## 2024-04-30 ENCOUNTER — APPOINTMENT (OUTPATIENT)
Dept: SURGERY | Facility: CLINIC | Age: 49
End: 2024-04-30
Payer: COMMERCIAL

## 2024-04-30 ENCOUNTER — TRANSCRIPTION ENCOUNTER (OUTPATIENT)
Age: 49
End: 2024-04-30

## 2024-04-30 VITALS
DIASTOLIC BLOOD PRESSURE: 78 MMHG | TEMPERATURE: 97.3 F | HEART RATE: 87 BPM | OXYGEN SATURATION: 98 % | WEIGHT: 156.5 LBS | SYSTOLIC BLOOD PRESSURE: 140 MMHG | BODY MASS INDEX: 23.18 KG/M2 | HEIGHT: 69 IN

## 2024-04-30 PROCEDURE — 99215 OFFICE O/P EST HI 40 MIN: CPT

## 2024-04-30 NOTE — ASSESSMENT
[FreeTextEntry1] : AMY CARRILLO  is a pleasant 48 year year old man  who came in 2023 for pancreatic duct dilatation . He has Dr MIRA ETIENNE as His PCP.  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, smoking > 30 years i pack/day,  family hx: father  at 80s with CA, mother  at 40s from brain tumor and she had diabetes,   CT chest 2023: done for smoking history showed calcifications in pancreas and dilated PD to 10mm,  MRI 2023: 8MM PD 3/9/2023: EUS DILATED pd 5-7MM, HYPERECHOIC STRUCTURE 9MM UNCINATE, WITHIN MAIN PD SUGGESTIVE OF STONE He had pancreatic stent placed on  with resolution of his pain but possible returning back recently  2023: shared concern of diffuse dilated PD, possible stone vs neoplastic disease, sending for ca 19-9, new CTs, will discuss at GI tumor board and see him in couple weeks  : he was recently admitted to [facility] for severe pain with ERCP done with change of his PD on may , he feels better now, brushing during ERCP showed atypical cells, 2023-> MRI showed enlarged head of pancreas suggestive of mild localized pancreatitis  i discussed in details the potential of stone vs neoplastic process in head of pancreas, will need to discuss at GI tumor board and patient and his friend they have my cell to contact me in couple weeks to get the final decision, we discussed stone removal by various endoscopic technique and will discuss this with dr Resendiz,he has coming CT in early 2024: he had repeated Endoscopy and stenting both biliary and pancreatic ducts, he expereinced worsening o fhis pain we rediscussed whipple surgery as options for his recurrent pancreatitis with severe calcifications/stones at head of pancreas, will repeat CT CAP, discuss with dr Resendiz and at GI tumor board  2024: we rediscussed need to control pain and possible endoscopic interventions including lithotripsy and pros and cons for surgery(whipple)  2024: feels great after adjusting his diet and his last scope, he is going to see GI [facility] for lithotripsy eval Dr Elaine, exam unchanged, he saw dr Tali Braun who placed him on metformin and losartan  will see him in 4 weeks with new mri liver protocol for ? perfusion liver lesion  the above plan of care with discussed in details to the patient and all questions were answered to patient satisfaction. patient instructed to follow up with the referring physician and patient primary care provider  A total of 45 minutes was spent on this visit, obtaining h/p,  reviewing previous notes/imaging/scopes, counseling the patient on possible etiology and procedure , ordering tests (above), and documenting the findings in the note.

## 2024-04-30 NOTE — HISTORY OF PRESENT ILLNESS
[de-identified] : AMY CARRILLO  is a pleasant 48 year year old man  who came in 2023 for pancreatic duct dilatation . 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, smoking > 30 years i pack/day, \par  family hx: father  at 80s with CA, mother  at 40s from brain tumor and she had diabetes, \par  \par  CT chest 2023: done for smoking history showed calcifications in pancreas and dilated PD to 10mm, \par  MRI 2023: 8MM PD\par  3/9/2023: EUS DILATED pd 5-7MM, HYPERECHOIC STRUCTURE 9MM UNCINATE, WITHIN MAIN PD SUGGESTIVE OF STONE\par  He had pancreatic stent placed on  with resolution of his pain but possible returning back recently\par  \par  : he was recently admitted to [facility] for severe pain with ERCP done with change of his PD on may , he feels better now, brushing during ERCP showed atypical cells, 2023-> MRI showed enlarged head of pancreas suggestive of mild localized pancreatitis \par  \par    \par  \par

## 2024-05-03 ENCOUNTER — NON-APPOINTMENT (OUTPATIENT)
Age: 49
End: 2024-05-03

## 2024-05-06 ENCOUNTER — NON-APPOINTMENT (OUTPATIENT)
Age: 49
End: 2024-05-06

## 2024-05-15 ENCOUNTER — APPOINTMENT (OUTPATIENT)
Dept: GASTROENTEROLOGY | Facility: CLINIC | Age: 49
End: 2024-05-15

## 2024-05-22 ENCOUNTER — APPOINTMENT (OUTPATIENT)
Dept: GASTROENTEROLOGY | Facility: CLINIC | Age: 49
End: 2024-05-22

## 2024-05-22 ENCOUNTER — TRANSCRIPTION ENCOUNTER (OUTPATIENT)
Age: 49
End: 2024-05-22

## 2024-05-22 PROCEDURE — 99442: CPT

## 2024-05-23 ENCOUNTER — TRANSCRIPTION ENCOUNTER (OUTPATIENT)
Age: 49
End: 2024-05-23

## 2024-05-23 NOTE — REVIEW OF SYSTEMS
[Fever] : no fever [Chills] : no chills [Feeling Poorly] : not feeling poorly [Feeling Tired] : not feeling tired [Recent Weight Gain (___ Lbs)] : recent [unfilled] ~Ulb weight gain [Recent Weight Loss (___ Lbs)] : no recent weight loss [As Noted in HPI] : as noted in HPI [Abdominal Pain] : abdominal pain [Vomiting] : no vomiting [Constipation] : no constipation [Diarrhea] : no diarrhea [Heartburn] : no heartburn [Melena (black stool)] : no melena [Fecal Incontinence (soiling)] : no fecal incontinence [Swollen Glands] : no swollen glands [Negative] : Heme/Lymph

## 2024-05-23 NOTE — PHYSICAL EXAM
[Alert] : alert [Normal Voice/Communication] : normal voice/communication [Healthy Appearing] : healthy appearing [No Acute Distress] : no acute distress [Oriented To Time, Place, And Person] : oriented to person, place, and time

## 2024-05-23 NOTE — ASSESSMENT
[FreeTextEntry1] : Patient is a 49-year-old male who initially came in for a dilated common bile duct and pancreatic duct on imaging, associated with symptomatic abdominal pain, postprandial pain and some weight loss for f/U today post repeat ERCP  in which prior stents were removed and additional PD and CBD stents placed. Patient tolerated procedure well at the time. His case was discussed on tumor board along with options including surgical intervention.   Chronic Pancreatitis with PD stones with chronic abdominal pain Enlarged GB with slow motility -S/P ERCP with stent exchange in the PD and stent placement in the CBD -Doing well post procedure -Referred To Dr Elaine for lithotripsy of PD stones  Scheduled for ERCP with stent exchange after ESWL. Follow-up with GI in 2 months

## 2024-05-23 NOTE — REASON FOR VISIT
[Home] : at home, [unfilled] , at the time of the visit. [Medical Office: ([facility])___] : at the medical office located in  [Verbal consent obtained from patient] : the patient, [unfilled] [Follow-up] : a follow-up of an existing diagnosis [FreeTextEntry4] : Sera [Post-op/Procedure: _________] : a [unfilled] post-op/procedure visit [Spouse] : spouse [FreeTextEntry1] : Lyrica Refill

## 2024-05-23 NOTE — HISTORY OF PRESENT ILLNESS
[FreeTextEntry1] : Patient is a 49-year-old male who initially came in for a dilated common bile duct and pancreatic duct on imaging, associated with symptomatic abdominal pain, postprandial pain and some weight loss for f/U today post repeat ERCP in which prior stents were removed and additional PD and CBD stents placed. Patient tolerated procedure well at the time. His case was discussed on tumor board along with options including surgical intervention. Dr Ansari followed him in hospital post ERCP. patient is scheduled for ESWL in July   [de-identified] : 4/15/24

## 2024-05-28 ENCOUNTER — TRANSCRIPTION ENCOUNTER (OUTPATIENT)
Age: 49
End: 2024-05-28

## 2024-05-31 ENCOUNTER — OUTPATIENT (OUTPATIENT)
Dept: OUTPATIENT SERVICES | Facility: HOSPITAL | Age: 49
LOS: 1 days | End: 2024-05-31
Payer: COMMERCIAL

## 2024-05-31 ENCOUNTER — RESULT REVIEW (OUTPATIENT)
Age: 49
End: 2024-05-31

## 2024-05-31 DIAGNOSIS — K86.89 OTHER SPECIFIED DISEASES OF PANCREAS: ICD-10-CM

## 2024-05-31 DIAGNOSIS — Z90.49 ACQUIRED ABSENCE OF OTHER SPECIFIED PARTS OF DIGESTIVE TRACT: Chronic | ICD-10-CM

## 2024-05-31 DIAGNOSIS — Z98.890 OTHER SPECIFIED POSTPROCEDURAL STATES: Chronic | ICD-10-CM

## 2024-05-31 DIAGNOSIS — K86.1 OTHER CHRONIC PANCREATITIS: ICD-10-CM

## 2024-05-31 PROCEDURE — A9579: CPT

## 2024-05-31 PROCEDURE — 74183 MRI ABD W/O CNTR FLWD CNTR: CPT

## 2024-05-31 PROCEDURE — 74183 MRI ABD W/O CNTR FLWD CNTR: CPT | Mod: 26

## 2024-06-01 DIAGNOSIS — K86.89 OTHER SPECIFIED DISEASES OF PANCREAS: ICD-10-CM

## 2024-06-01 DIAGNOSIS — K86.1 OTHER CHRONIC PANCREATITIS: ICD-10-CM

## 2024-06-04 ENCOUNTER — TRANSCRIPTION ENCOUNTER (OUTPATIENT)
Age: 49
End: 2024-06-04

## 2024-06-05 ENCOUNTER — TRANSCRIPTION ENCOUNTER (OUTPATIENT)
Age: 49
End: 2024-06-05

## 2024-06-06 ENCOUNTER — TRANSCRIPTION ENCOUNTER (OUTPATIENT)
Age: 49
End: 2024-06-06

## 2024-06-06 RX ORDER — PREGABALIN 75 MG/1
75 CAPSULE ORAL TWICE DAILY
Qty: 60 | Refills: 3 | Status: ACTIVE | COMMUNITY
Start: 2024-06-06 | End: 1900-01-01

## 2024-06-07 ENCOUNTER — TRANSCRIPTION ENCOUNTER (OUTPATIENT)
Age: 49
End: 2024-06-07

## 2024-06-11 ENCOUNTER — APPOINTMENT (OUTPATIENT)
Dept: SURGERY | Facility: CLINIC | Age: 49
End: 2024-06-11
Payer: COMMERCIAL

## 2024-06-11 VITALS
HEIGHT: 69 IN | OXYGEN SATURATION: 97 % | BODY MASS INDEX: 21.77 KG/M2 | HEART RATE: 81 BPM | DIASTOLIC BLOOD PRESSURE: 72 MMHG | SYSTOLIC BLOOD PRESSURE: 138 MMHG | WEIGHT: 147 LBS | TEMPERATURE: 97 F

## 2024-06-11 DIAGNOSIS — K86.89 OTHER SPECIFIED DISEASES OF PANCREAS: ICD-10-CM

## 2024-06-11 DIAGNOSIS — R10.9 UNSPECIFIED ABDOMINAL PAIN: ICD-10-CM

## 2024-06-11 DIAGNOSIS — K86.1 OTHER CHRONIC PANCREATITIS: ICD-10-CM

## 2024-06-11 PROCEDURE — 99215 OFFICE O/P EST HI 40 MIN: CPT

## 2024-06-11 NOTE — HISTORY OF PRESENT ILLNESS
[de-identified] : AMY CARRILLO  is a pleasant 48 year year old man  who came in 2023 for pancreatic duct dilatation . 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, smoking > 30 years i pack/day, \par  family hx: father  at 80s with CA, mother  at 40s from brain tumor and she had diabetes, \par  \par  CT chest 2023: done for smoking history showed calcifications in pancreas and dilated PD to 10mm, \par  MRI 2023: 8MM PD\par  3/9/2023: EUS DILATED pd 5-7MM, HYPERECHOIC STRUCTURE 9MM UNCINATE, WITHIN MAIN PD SUGGESTIVE OF STONE\par  He had pancreatic stent placed on  with resolution of his pain but possible returning back recently\par  \par  : he was recently admitted to [facility] for severe pain with ERCP done with change of his PD on may , he feels better now, brushing during ERCP showed atypical cells, 2023-> MRI showed enlarged head of pancreas suggestive of mild localized pancreatitis \par  \par    \par  \par

## 2024-06-11 NOTE — ASSESSMENT
[FreeTextEntry1] : AMY CARRILLO  is a pleasant 48 year year old man  who came in 2023 for pancreatic duct dilatation . He has Dr MIRA ETIENNE as His PCP.  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, smoking > 30 years i pack/day,  family hx: father  at 80s with CA, mother  at 40s from brain tumor and she had diabetes,   CT chest 2023: done for smoking history showed calcifications in pancreas and dilated PD to 10mm,  MRI 2023: 8MM PD 3/9/2023: EUS DILATED pd 5-7MM, HYPERECHOIC STRUCTURE 9MM UNCINATE, WITHIN MAIN PD SUGGESTIVE OF STONE He had pancreatic stent placed on  with resolution of his pain but possible returning back recently  2023: shared concern of diffuse dilated PD, possible stone vs neoplastic disease, sending for ca 19-9, new CTs, will discuss at GI tumor board and see him in couple weeks  : he was recently admitted to [facility] for severe pain with ERCP done with change of his PD on may , he feels better now, brushing during ERCP showed atypical cells, 2023-> MRI showed enlarged head of pancreas suggestive of mild localized pancreatitis  i discussed in details the potential of stone vs neoplastic process in head of pancreas, will need to discuss at GI tumor board and patient and his friend they have my cell to contact me in couple weeks to get the final decision, we discussed stone removal by various endoscopic technique and will discuss this with dr Resendiz,he has coming CT in early 2024: he had repeated Endoscopy and stenting both biliary and pancreatic ducts, he expereinced worsening o fhis pain we rediscussed whipple surgery as options for his recurrent pancreatitis with severe calcifications/stones at head of pancreas, will repeat CT CAP, discuss with dr Resendiz and at GI tumor board  2024: we rediscussed need to control pain and possible endoscopic interventions including lithotripsy and pros and cons for surgery(whipple)  2024: feels great after adjusting his diet and his last scope, he is going to see GI [facility] for lithotripsy eval Dr Elaine, exam unchanged, he saw dr Tali Braun who placed him on metformin and losartan  will see him in 4 weeks with new mri liver protocol for ? perfusion liver lesion  2024 : last ERCP 2024 with dr Resendiz that helped pain for 3 weeks then pain came back and went suddenly and last 10 days no pian, lost 6 pounds from last visit he still has stent in pancreatic duct  exam is unchanged,  he has an appt  for lithotripsy at [facility] Dr Elaine he has an appt with dr Resendiz  with dr Resendiz to replace his pancreatic stent he has not done MRI but like to have it done before lithotripsy  the above plan of care with discussed in details to the patient and all questions were answered to patient satisfaction. patient instructed to follow up with the referring physician and patient primary care provider  A total of 45 minutes was spent on this visit, obtaining h/p,  reviewing previous notes/imaging/scopes, counseling the patient on possible etiology and procedure , ordering tests (above), and documenting the findings in the note.

## 2024-07-10 ENCOUNTER — RESULT REVIEW (OUTPATIENT)
Age: 49
End: 2024-07-10

## 2024-07-11 ENCOUNTER — TRANSCRIPTION ENCOUNTER (OUTPATIENT)
Age: 49
End: 2024-07-11

## 2024-07-11 ENCOUNTER — OUTPATIENT (OUTPATIENT)
Dept: OUTPATIENT SERVICES | Facility: HOSPITAL | Age: 49
LOS: 1 days | Discharge: HOME | End: 2024-07-11
Payer: COMMERCIAL

## 2024-07-11 VITALS
OXYGEN SATURATION: 99 % | HEIGHT: 68 IN | DIASTOLIC BLOOD PRESSURE: 92 MMHG | WEIGHT: 141.98 LBS | RESPIRATION RATE: 18 BRPM | SYSTOLIC BLOOD PRESSURE: 143 MMHG | HEART RATE: 62 BPM | TEMPERATURE: 99 F

## 2024-07-11 VITALS
OXYGEN SATURATION: 100 % | SYSTOLIC BLOOD PRESSURE: 146 MMHG | HEART RATE: 72 BPM | TEMPERATURE: 98 F | RESPIRATION RATE: 17 BRPM | DIASTOLIC BLOOD PRESSURE: 98 MMHG

## 2024-07-11 DIAGNOSIS — R10.9 UNSPECIFIED ABDOMINAL PAIN: ICD-10-CM

## 2024-07-11 DIAGNOSIS — K86.1 OTHER CHRONIC PANCREATITIS: ICD-10-CM

## 2024-07-11 DIAGNOSIS — Z98.890 OTHER SPECIFIED POSTPROCEDURAL STATES: Chronic | ICD-10-CM

## 2024-07-11 DIAGNOSIS — Z90.49 ACQUIRED ABSENCE OF OTHER SPECIFIED PARTS OF DIGESTIVE TRACT: Chronic | ICD-10-CM

## 2024-07-11 LAB — GLUCOSE BLDC GLUCOMTR-MCNC: 126 MG/DL — HIGH (ref 70–99)

## 2024-07-11 PROCEDURE — 43264 ERCP REMOVE DUCT CALCULI: CPT | Mod: XU

## 2024-07-11 PROCEDURE — C2625: CPT

## 2024-07-11 PROCEDURE — 74330 X-RAY BILE/PANC ENDOSCOPY: CPT | Mod: 26

## 2024-07-11 PROCEDURE — C9399: CPT

## 2024-07-11 PROCEDURE — 88112 CYTOPATH CELL ENHANCE TECH: CPT | Mod: 26

## 2024-07-11 PROCEDURE — 82962 GLUCOSE BLOOD TEST: CPT

## 2024-07-11 PROCEDURE — 43276 ERCP STENT EXCHANGE W/DILATE: CPT | Mod: 58

## 2024-07-11 PROCEDURE — 88305 TISSUE EXAM BY PATHOLOGIST: CPT | Mod: 26

## 2024-07-11 PROCEDURE — C1769: CPT

## 2024-07-11 PROCEDURE — 43274 ERCP DUCT STENT PLACEMENT: CPT | Mod: XU

## 2024-07-11 PROCEDURE — 43261 ENDO CHOLANGIOPANCREATOGRAPH: CPT | Mod: XU

## 2024-07-11 PROCEDURE — 88305 TISSUE EXAM BY PATHOLOGIST: CPT

## 2024-07-11 PROCEDURE — 72083 X-RAY EXAM ENTIRE SPI 4/5 VW: CPT

## 2024-07-11 PROCEDURE — 88112 CYTOPATH CELL ENHANCE TECH: CPT

## 2024-07-11 PROCEDURE — C1889: CPT

## 2024-07-11 PROCEDURE — C2617: CPT

## 2024-07-11 RX ORDER — BUPIVACAINE HYDROCHLORIDE AND EPINEPHRINE BITARTRATE 2.5; .005 MG/ML; MG/ML
5 INJECTION, SOLUTION EPIDURAL; INFILTRATION; INTRACAUDAL; PERINEURAL ONCE
Refills: 0 | Status: DISCONTINUED | OUTPATIENT
Start: 2024-07-11 | End: 2024-07-11

## 2024-07-11 RX ORDER — BUPIVACAINE HYDROCHLORIDE 5 MG/ML
5 INJECTION, SOLUTION EPIDURAL; INTRACAUDAL; PERINEURAL ONCE
Refills: 0 | Status: DISCONTINUED | OUTPATIENT
Start: 2024-07-11 | End: 2024-07-11

## 2024-07-11 RX ORDER — BUPIVACAINE HYDROCHLORIDE AND EPINEPHRINE BITARTRATE 2.5; .005 MG/ML; MG/ML
50 INJECTION, SOLUTION EPIDURAL; INFILTRATION; INTRACAUDAL; PERINEURAL ONCE
Refills: 0 | Status: DISCONTINUED | OUTPATIENT
Start: 2024-07-11 | End: 2024-07-11

## 2024-07-11 RX ORDER — HYDROMORPHONE HCL 0.2 MG/ML
1 INJECTION, SOLUTION INTRAVENOUS ONCE
Refills: 0 | Status: DISCONTINUED | OUTPATIENT
Start: 2024-07-11 | End: 2024-07-11

## 2024-07-11 RX ORDER — OXYCODONE AND ACETAMINOPHEN 5; 325 MG/1; MG/1
2 TABLET ORAL
Refills: 0 | DISCHARGE

## 2024-07-11 RX ADMIN — HYDROMORPHONE HCL 1 MILLIGRAM(S): 0.2 INJECTION, SOLUTION INTRAVENOUS at 13:10

## 2024-07-19 ENCOUNTER — TRANSCRIPTION ENCOUNTER (OUTPATIENT)
Age: 49
End: 2024-07-19

## 2024-07-19 DIAGNOSIS — I10 ESSENTIAL (PRIMARY) HYPERTENSION: ICD-10-CM

## 2024-07-19 DIAGNOSIS — E11.9 TYPE 2 DIABETES MELLITUS WITHOUT COMPLICATIONS: ICD-10-CM

## 2024-07-19 DIAGNOSIS — K86.1 OTHER CHRONIC PANCREATITIS: ICD-10-CM

## 2024-07-19 DIAGNOSIS — Z79.84 LONG TERM (CURRENT) USE OF ORAL HYPOGLYCEMIC DRUGS: ICD-10-CM

## 2024-07-31 ENCOUNTER — TRANSCRIPTION ENCOUNTER (OUTPATIENT)
Age: 49
End: 2024-07-31

## 2024-08-13 ENCOUNTER — APPOINTMENT (OUTPATIENT)
Dept: SURGERY | Facility: CLINIC | Age: 49
End: 2024-08-13
Payer: COMMERCIAL

## 2024-08-13 VITALS
HEIGHT: 69 IN | WEIGHT: 142 LBS | BODY MASS INDEX: 21.03 KG/M2 | HEART RATE: 87 BPM | SYSTOLIC BLOOD PRESSURE: 126 MMHG | DIASTOLIC BLOOD PRESSURE: 80 MMHG | OXYGEN SATURATION: 98 %

## 2024-08-13 DIAGNOSIS — R31.9 HEMATURIA, UNSPECIFIED: ICD-10-CM

## 2024-08-13 DIAGNOSIS — K86.1 OTHER CHRONIC PANCREATITIS: ICD-10-CM

## 2024-08-13 DIAGNOSIS — K86.89 OTHER SPECIFIED DISEASES OF PANCREAS: ICD-10-CM

## 2024-08-13 PROBLEM — I10 ESSENTIAL (PRIMARY) HYPERTENSION: Chronic | Status: ACTIVE | Noted: 2024-07-11

## 2024-08-13 PROBLEM — Z86.39 PERSONAL HISTORY OF OTHER ENDOCRINE, NUTRITIONAL AND METABOLIC DISEASE: Chronic | Status: ACTIVE | Noted: 2024-07-11

## 2024-08-13 PROCEDURE — 99215 OFFICE O/P EST HI 40 MIN: CPT

## 2024-08-13 NOTE — ASSESSMENT
[FreeTextEntry1] : AMY CARRILLO  is a pleasant 48 year year old man  who came in 2023 for pancreatic duct dilatation . He has Dr MIRA ETIENNE as His PCP.  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, smoking > 30 years i pack/day,  family hx: father  at 80s with CA, mother  at 40s from brain tumor and she had diabetes,   CT chest 2023: done for smoking history showed calcifications in pancreas and dilated PD to 10mm,  MRI 2023: 8MM PD 3/9/2023: EUS DILATED pd 5-7MM, HYPERECHOIC STRUCTURE 9MM UNCINATE, WITHIN MAIN PD SUGGESTIVE OF STONE He had pancreatic stent placed on  with resolution of his pain but possible returning back recently  2023: shared concern of diffuse dilated PD, possible stone vs neoplastic disease, sending for ca 19-9, new CTs, will discuss at GI tumor board and see him in couple weeks  : he was recently admitted to [facility] for severe pain with ERCP done with change of his PD on may , he feels better now, brushing during ERCP showed atypical cells, 2023-> MRI showed enlarged head of pancreas suggestive of mild localized pancreatitis  i discussed in details the potential of stone vs neoplastic process in head of pancreas, will need to discuss at GI tumor board and patient and his friend they have my cell to contact me in couple weeks to get the final decision, we discussed stone removal by various endoscopic technique and will discuss this with dr Resendiz,he has coming CT in early 2024: he had repeated Endoscopy and stenting both biliary and pancreatic ducts, he expereinced worsening o fhis pain we rediscussed whipple surgery as options for his recurrent pancreatitis with severe calcifications/stones at head of pancreas, will repeat CT CAP, discuss with dr Resendiz and at GI tumor board  2024: we rediscussed need to control pain and possible endoscopic interventions including lithotripsy and pros and cons for surgery(whipple)  2024: feels great after adjusting his diet and his last scope, he is going to see GI [facility] for lithotripsy eval Dr Elaine, exam unchanged, he saw dr Tali Braun who placed him on metformin and losartan  will see him in 4 weeks with new mri liver protocol for ? perfusion liver lesion  2024 : last ERCP 2024 with dr Resendiz that helped pain for 3 weeks then pain came back and went suddenly and last 10 days no pian, lost 6 pounds from last visit he still has stent in pancreatic duct  exam is unchanged,  he has an appt  for lithotripsy at [facility] Dr Elaine he has an appt with dr Resendiz  with dr Resendiz to replace his pancreatic stent he has not done MRI but like to have it done before lithotripsy   2024 : feels much better pain wise after celiac block with dr Resendiz, he had two lithotripsy with dr Elaine, he was told stone got smaller 2024 cytology is mild-mod atypia, 1-2/10 pain now, he is troubled after eating, weight stable,  sending for CEA/CA 19-9, amylase, lipase, A1C(ordered by dr Braun, sending for MRI urine cytology and analysis for hematuria he has small ring rash with clean center in RUQ toward midline, will monitor that and go to dermatology    the above plan of care with discussed in details to the patient and all questions were answered to patient satisfaction. patient instructed to follow up with the referring physician and patient primary care provider  A total of 45 minutes was spent on this visit, obtaining h/p,  reviewing previous notes/imaging/scopes, counseling the patient on possible etiology and  ordering tests (above), and documenting the findings in the note.

## 2024-08-15 ENCOUNTER — NON-APPOINTMENT (OUTPATIENT)
Age: 49
End: 2024-08-15

## 2024-08-22 ENCOUNTER — OUTPATIENT (OUTPATIENT)
Dept: OUTPATIENT SERVICES | Facility: HOSPITAL | Age: 49
LOS: 1 days | End: 2024-08-22
Payer: COMMERCIAL

## 2024-08-22 ENCOUNTER — RESULT REVIEW (OUTPATIENT)
Age: 49
End: 2024-08-22

## 2024-08-22 DIAGNOSIS — Z90.49 ACQUIRED ABSENCE OF OTHER SPECIFIED PARTS OF DIGESTIVE TRACT: Chronic | ICD-10-CM

## 2024-08-22 DIAGNOSIS — K86.89 OTHER SPECIFIED DISEASES OF PANCREAS: ICD-10-CM

## 2024-08-22 DIAGNOSIS — Z00.8 ENCOUNTER FOR OTHER GENERAL EXAMINATION: ICD-10-CM

## 2024-08-22 DIAGNOSIS — K86.1 OTHER CHRONIC PANCREATITIS: ICD-10-CM

## 2024-08-22 DIAGNOSIS — Z98.890 OTHER SPECIFIED POSTPROCEDURAL STATES: Chronic | ICD-10-CM

## 2024-08-22 LAB
AMYLASE/CREAT SERPL: 94 U/L
CANCER AG19-9 SERPL-ACNC: 13 U/ML
CEA SERPL-MCNC: 6.2 NG/ML
LPL SERPL-CCNC: 63 U/L

## 2024-08-22 PROCEDURE — 74183 MRI ABD W/O CNTR FLWD CNTR: CPT | Mod: 26

## 2024-08-22 PROCEDURE — 74183 MRI ABD W/O CNTR FLWD CNTR: CPT

## 2024-08-22 PROCEDURE — A9579: CPT

## 2024-08-23 DIAGNOSIS — K86.1 OTHER CHRONIC PANCREATITIS: ICD-10-CM

## 2024-08-23 DIAGNOSIS — K86.89 OTHER SPECIFIED DISEASES OF PANCREAS: ICD-10-CM

## 2024-08-26 ENCOUNTER — APPOINTMENT (OUTPATIENT)
Dept: ENDOCRINOLOGY | Facility: CLINIC | Age: 49
End: 2024-08-26
Payer: COMMERCIAL

## 2024-08-26 VITALS
HEIGHT: 69 IN | SYSTOLIC BLOOD PRESSURE: 118 MMHG | HEART RATE: 85 BPM | DIASTOLIC BLOOD PRESSURE: 68 MMHG | BODY MASS INDEX: 21.48 KG/M2 | WEIGHT: 145 LBS | OXYGEN SATURATION: 98 %

## 2024-08-26 DIAGNOSIS — K86.89 OTHER SPECIFIED DISEASES OF PANCREAS: ICD-10-CM

## 2024-08-26 DIAGNOSIS — K86.1 OTHER CHRONIC PANCREATITIS: ICD-10-CM

## 2024-08-26 DIAGNOSIS — E11.9 TYPE 2 DIABETES MELLITUS W/OUT COMPLICATIONS: ICD-10-CM

## 2024-08-26 PROCEDURE — 99213 OFFICE O/P EST LOW 20 MIN: CPT

## 2024-08-26 RX ORDER — METFORMIN HYDROCHLORIDE 500 MG/1
500 TABLET, COATED ORAL
Qty: 180 | Refills: 1 | Status: ACTIVE | COMMUNITY
Start: 2024-08-26 | End: 1900-01-01

## 2024-08-26 NOTE — REVIEW OF SYSTEMS
[Fatigue] : fatigue [Blurred Vision] : blurred vision [As Noted in HPI] : as noted in HPI [All other systems negative] : All other systems negative [Recent Weight Loss (___ Lbs)] : no recent weight loss [Dysphagia] : no dysphagia [Dysphonia] : no dysphonia

## 2024-08-26 NOTE — DATA REVIEWED
[FreeTextEntry1] : LISA reviewed labs A1c 6.5%  8/2024:  A1c 7.3%  hb 13.2 TSH 0.8    Tg 86 glucose 152  crea 0.9

## 2024-08-26 NOTE — HISTORY OF PRESENT ILLNESS
[FreeTextEntry1] : Mr Rios is a 49 years old male patient with history of chronic pancreatitis s/p multiple CBD AND PD stents ( from stones )  who present for follow up evaluation of newly diagnosed diabetes   #newly diagnosed diabetes  - over past 2 years patient was diagnosed with acute on Chronic Pancreatitis with PD stones with chronic abdominal pain Enlarged GB with slow motility requiring multiple ERCP and stents  - recently admitted to the hospital with same and Bg were found to be elevated  - 4/2024 A1c 6.5% , used to follow prior regularly with PCP and no DM diagnosis  - since diagnosis patient on metformin 500 mg daily , he did lose a lot of weight due to pancreas illness and did major modification to his diet    8/2024: remain on metformin 500 mg daily , had lithothripsy done and scheduled for another one with ERCP  FS slightly above target on metformin 500 mg daily

## 2024-08-26 NOTE — ASSESSMENT
[FreeTextEntry1] : Mr Rios is a 49 years old male patient with history of chronic pancreatitis s/p multiple CBD AND PD stents ( from stones ) who present for follow up  evaluation of newly diagnosed diabetes, now undergoing lithotripsy and ERCP   #newly diagnosed diabetes/ chronic pancreatitis  - over past 2 years patient was diagnosed with acute on Chronic Pancreatitis with PD stones with chronic abdominal pain Enlarged GB with slow motility requiring multiple ERCP and stents - recently admitted to the hospital with same and Bg were found to be elevated - 4/2024 A1c 6.5% , used to follow prior regularly with PCP and no DM diagnosis - since diagnosis patient on metformin 500 mg daily , he did lose a lot of weight due to pancreas illness and did major modification to his diet 8/2024: A1c 7.3% ( insetting of anemia ) remain on metformin 500 mg daily , had lithotripsy done and scheduled for another one with ERCP FS slightly above target on metformin 500 mg daily  - aware of possible progression to type 1 DM with chornic pancreatitis at this stage based on FS monitoring his Bg are acceptable with metformin and diet modification  - will increase metformin to 500 mg BID  - prefer not to use sensor and will be monitoring FS at least BID  - IF BG > 200 for 24 hours advised to contact office

## 2024-09-03 ENCOUNTER — TRANSCRIPTION ENCOUNTER (OUTPATIENT)
Age: 49
End: 2024-09-03

## 2024-09-04 ENCOUNTER — TRANSCRIPTION ENCOUNTER (OUTPATIENT)
Age: 49
End: 2024-09-04

## 2024-09-09 ENCOUNTER — TRANSCRIPTION ENCOUNTER (OUTPATIENT)
Age: 49
End: 2024-09-09

## 2024-09-09 ENCOUNTER — RESULT REVIEW (OUTPATIENT)
Age: 49
End: 2024-09-09

## 2024-09-09 ENCOUNTER — OUTPATIENT (OUTPATIENT)
Dept: OUTPATIENT SERVICES | Facility: HOSPITAL | Age: 49
LOS: 1 days | Discharge: HOME | End: 2024-09-09
Payer: COMMERCIAL

## 2024-09-09 VITALS
DIASTOLIC BLOOD PRESSURE: 93 MMHG | SYSTOLIC BLOOD PRESSURE: 134 MMHG | RESPIRATION RATE: 18 BRPM | OXYGEN SATURATION: 96 % | HEART RATE: 63 BPM

## 2024-09-09 VITALS
OXYGEN SATURATION: 99 % | TEMPERATURE: 99 F | DIASTOLIC BLOOD PRESSURE: 83 MMHG | HEIGHT: 69 IN | WEIGHT: 143.96 LBS | SYSTOLIC BLOOD PRESSURE: 131 MMHG | HEART RATE: 64 BPM | RESPIRATION RATE: 18 BRPM

## 2024-09-09 DIAGNOSIS — Z90.49 ACQUIRED ABSENCE OF OTHER SPECIFIED PARTS OF DIGESTIVE TRACT: Chronic | ICD-10-CM

## 2024-09-09 DIAGNOSIS — Z98.890 OTHER SPECIFIED POSTPROCEDURAL STATES: Chronic | ICD-10-CM

## 2024-09-09 DIAGNOSIS — R10.9 UNSPECIFIED ABDOMINAL PAIN: ICD-10-CM

## 2024-09-09 DIAGNOSIS — K86.1 OTHER CHRONIC PANCREATITIS: ICD-10-CM

## 2024-09-09 PROCEDURE — 88112 CYTOPATH CELL ENHANCE TECH: CPT | Mod: 26

## 2024-09-09 PROCEDURE — 43261 ENDO CHOLANGIOPANCREATOGRAPH: CPT | Mod: XU

## 2024-09-09 PROCEDURE — 74330 X-RAY BILE/PANC ENDOSCOPY: CPT | Mod: 26

## 2024-09-09 PROCEDURE — C1773: CPT

## 2024-09-09 PROCEDURE — 88112 CYTOPATH CELL ENHANCE TECH: CPT

## 2024-09-09 PROCEDURE — 43264 ERCP REMOVE DUCT CALCULI: CPT | Mod: XU

## 2024-09-09 PROCEDURE — 74018 RADEX ABDOMEN 1 VIEW: CPT

## 2024-09-09 PROCEDURE — 43276 ERCP STENT EXCHANGE W/DILATE: CPT

## 2024-09-09 PROCEDURE — C1769: CPT

## 2024-09-09 PROCEDURE — C2617: CPT

## 2024-09-09 PROCEDURE — C9399: CPT

## 2024-09-09 NOTE — ASU PATIENT PROFILE, ADULT - WHEN WAS YOUR LAST VACCINATION? YEAR
Pt. seen and examined at bedside. States that he is feeling very well. Has been OOB and walking. Voiding well. No complaints at this time. Asked when he would be going home.    LLE:  Hemovac in place  Dressing c/d/i  TA/EHL/GSC motor intact  SILT SPN, DPN, Sural, Saph, Tibial  Brisk cap refill  Compartments soft and compressible HPI:  73 year old male  with  left knee severe Osteoarthritis with moderate pain, swelling, limited mobility and ambulation presents at [facility] for his Left TKR with Dr. Del Angel. Nonoperative treatment failed. X rays confirm diagnosis.  Symptoms worse with stairs and after prolonged immobility and increased over years.  Patient day # 1 post op Left TKR with moderate left knee pain and malaise.    PAST MEDICAL & SURGICAL HISTORY:  Prostate CA seeds implants  Osteoarthritis  HLD (hyperlipidemia)  Pulmonary nodules  Vitamin D insufficiency  APC (atrial premature contractions)  History of COPD  History of memory loss  Mitral regurgitation  tricuspid regurgitation  Shortness of breath  H/O varicose veins bilat  2019 novel coronavirus disease (COVID-19)  History of appendectomy  History of tonsillectomy  H/O hernia repair  BILAT  H/O hemorrhoidectomy X2    MEDICATIONS  (STANDING):  acetaminophen     Tablet .. 975 milliGRAM(s) Oral every 8 hours  acetaminophen   IVPB .. 1000 milliGRAM(s) IV Intermittent once  atorvastatin 40 milliGRAM(s) Oral at bedtime  BUpivacaine liposome 1.3% Injectable (no eMAR) 20 milliLiter(s) Local Injection once  celecoxib 200 milliGRAM(s) Oral every 12 hours  enoxaparin Injectable 40 milliGRAM(s) SubCutaneous every 24 hours  lactated ringers. 1000 milliLiter(s) (75 mL/Hr) IV Continuous <Continuous>  pantoprazole    Tablet 40 milliGRAM(s) Oral before breakfast    MEDICATIONS  (PRN):  aluminum hydroxide/magnesium hydroxide/simethicone Suspension 30 milliLiter(s) Oral four times a day PRN Indigestion  HYDROmorphone  Injectable 0.5 milliGRAM(s) IV Push every 10 minutes PRN Severe Pain (7 - 10)  magnesium hydroxide Suspension 30 milliLiter(s) Oral daily PRN Constipation  ondansetron Injectable 4 milliGRAM(s) IV Push every 6 hours PRN Nausea and/or Vomiting  oxyCODONE    IR 5 milliGRAM(s) Oral every 3 hours PRN Moderate Pain (4 - 6)  oxyCODONE    IR 10 milliGRAM(s) Oral every 3 hours PRN Severe Pain (7 - 10)  traMADol 50 milliGRAM(s) Oral every 6 hours PRN Mild Pain (1 - 3)    Allergies    No Known Allergies    REVIEW OF SYSTEMS    General:  malaise	  Skin/Breast: normal  Ophthalmologic: negative  ENMT:	normal  Respiratory and Thorax: normal  Cardiovascular:	normal  Gastrointestinal:	normal  Genitourinary:	normal  Musculoskeletal:	 left knee swelling   Neurological:	normal  Psychiatric: anxiety  Hematology/Lymphatics negative  Endocrine:	negative  Allergic/Immunologic:	negative    PHYSICAL EXAM:    Vital Signs Last 24 Hrs  T(C): 36.9 (09 Mar 2022 05:00), Max: 36.9 (08 Mar 2022 20:30)  T(F): 98.5 (09 Mar 2022 05:00), Max: 98.5 (08 Mar 2022 20:30)  HR: 65 (09 Mar 2022 05:00) (64 - 74)  BP: 138/65 (09 Mar 2022 05:00) (108/62 - 162/65)  BP(mean): 98 (08 Mar 2022 13:16) (87 - 102)  RR: 16 (09 Mar 2022 05:00) (6 - 20)  SpO2: 96% (09 Mar 2022 05:00) (92% - 99%)    Constitutional: WDWNM  Eyes: conj pale  ENMT: negative  Neck: supple  Breasts: not examined   Back: negative  Respiratory: clear to P&A  Cardiovascular: no MRGT or H  Gastrointestinal: normal bowel sounds  Genitourinary: neg  Rectal: not examined  Extremities: normal  Vascular: normal  Neurological: normal  Skin: negative  Lymph Nodes: negative  Musculoskeletal:   decreased ROM  left knee   Psychiatric: anxiety                    2021

## 2024-09-09 NOTE — PRE-ANESTHESIA EVALUATION ADULT - HEART RATE (BEATS/MIN)
64 Trilobed Flap Text: The defect edges were debeveled with a #15 scalpel blade.  Given the location of the defect and the proximity to free margins a trilobed flap was deemed most appropriate.  Using a sterile surgical marker, an appropriate trilobed flap drawn around the defect.    The area thus outlined was incised deep to adipose tissue with a #15 scalpel blade.  The skin margins were undermined to an appropriate distance in all directions utilizing iris scissors.

## 2024-09-09 NOTE — ASU PATIENT PROFILE, ADULT - NSICDXPASTMEDICALHX_GEN_ALL_CORE_FT
PAST MEDICAL HISTORY:  Back pain buldging and herniated discs    Bulging lumbar disc     H/O type 2 diabetes mellitus     HTN (hypertension)     Lumbar herniated disc     Pancreatitis

## 2024-09-09 NOTE — ASU PATIENT PROFILE, ADULT - FALL HARM RISK - UNIVERSAL INTERVENTIONS
Bed in lowest position, wheels locked, appropriate side rails in place/Call bell, personal items and telephone in reach/Instruct patient to call for assistance before getting out of bed or chair/Non-slip footwear when patient is out of bed/[place] to call system/Physically safe environment - no spills, clutter or unnecessary equipment/Purposeful Proactive Rounding/Room/bathroom lighting operational, light cord in reach

## 2024-09-11 LAB — NON-GYNECOLOGICAL CYTOLOGY STUDY: SIGNIFICANT CHANGE UP

## 2024-09-20 DIAGNOSIS — I10 ESSENTIAL (PRIMARY) HYPERTENSION: ICD-10-CM

## 2024-09-20 DIAGNOSIS — Z79.84 LONG TERM (CURRENT) USE OF ORAL HYPOGLYCEMIC DRUGS: ICD-10-CM

## 2024-09-20 DIAGNOSIS — K86.89 OTHER SPECIFIED DISEASES OF PANCREAS: ICD-10-CM

## 2024-09-20 DIAGNOSIS — E11.9 TYPE 2 DIABETES MELLITUS WITHOUT COMPLICATIONS: ICD-10-CM

## 2024-09-24 ENCOUNTER — APPOINTMENT (OUTPATIENT)
Dept: SURGERY | Facility: CLINIC | Age: 49
End: 2024-09-24
Payer: COMMERCIAL

## 2024-09-24 VITALS
BODY MASS INDEX: 21.92 KG/M2 | SYSTOLIC BLOOD PRESSURE: 130 MMHG | DIASTOLIC BLOOD PRESSURE: 70 MMHG | OXYGEN SATURATION: 98 % | WEIGHT: 148 LBS | HEIGHT: 69 IN | HEART RATE: 92 BPM

## 2024-09-24 DIAGNOSIS — K86.89 OTHER SPECIFIED DISEASES OF PANCREAS: ICD-10-CM

## 2024-09-24 PROCEDURE — 99215 OFFICE O/P EST HI 40 MIN: CPT

## 2024-09-24 RX ORDER — PANCRELIPASE LIPASE, PANCRELIPASE PROTEASE, PANCRELIPASE AMYLASE 252600; 60000; 189600 [USP'U]/1; [USP'U]/1; [USP'U]/1
60000-189600 CAPSULE, DELAYED RELEASE ORAL
Qty: 270 | Refills: 0 | Status: ACTIVE | COMMUNITY
Start: 2024-09-24 | End: 1900-01-01

## 2024-09-25 NOTE — HISTORY OF PRESENT ILLNESS
[de-identified] : AMY CARRILLO  is a pleasant 48 year year old man  who came in 2023 for pancreatic duct dilatation . 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, smoking > 30 years i pack/day, \par  family hx: father  at 80s with CA, mother  at 40s from brain tumor and she had diabetes, \par  \par  CT chest 2023: done for smoking history showed calcifications in pancreas and dilated PD to 10mm, \par  MRI 2023: 8MM PD\par  3/9/2023: EUS DILATED pd 5-7MM, HYPERECHOIC STRUCTURE 9MM UNCINATE, WITHIN MAIN PD SUGGESTIVE OF STONE\par  He had pancreatic stent placed on  with resolution of his pain but possible returning back recently\par  \par  : he was recently admitted to [facility] for severe pain with ERCP done with change of his PD on may , he feels better now, brushing during ERCP showed atypical cells, 2023-> MRI showed enlarged head of pancreas suggestive of mild localized pancreatitis \par  \par    \par  \par

## 2024-09-25 NOTE — HISTORY OF PRESENT ILLNESS
[de-identified] : AMY CARRILLO  is a pleasant 48 year year old man  who came in 2023 for pancreatic duct dilatation . 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, smoking > 30 years i pack/day, \par  family hx: father  at 80s with CA, mother  at 40s from brain tumor and she had diabetes, \par  \par  CT chest 2023: done for smoking history showed calcifications in pancreas and dilated PD to 10mm, \par  MRI 2023: 8MM PD\par  3/9/2023: EUS DILATED pd 5-7MM, HYPERECHOIC STRUCTURE 9MM UNCINATE, WITHIN MAIN PD SUGGESTIVE OF STONE\par  He had pancreatic stent placed on  with resolution of his pain but possible returning back recently\par  \par  : he was recently admitted to [facility] for severe pain with ERCP done with change of his PD on may , he feels better now, brushing during ERCP showed atypical cells, 2023-> MRI showed enlarged head of pancreas suggestive of mild localized pancreatitis \par  \par    \par  \par

## 2024-09-25 NOTE — ASSESSMENT
[FreeTextEntry1] : AMY CARRILLO  is a pleasant 48 year year old man  who came in 2023 for pancreatic duct dilatation . He has Dr MIRA ETIENNE as His PCP.  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, smoking > 30 years i pack/day,  family hx: father  at 80s with CA, mother  at 40s from brain tumor and she had diabetes,   CT chest 2023: done for smoking history showed calcifications in pancreas and dilated PD to 10mm,  MRI 2023: 8MM PD 3/9/2023: EUS DILATED pd 5-7MM, HYPERECHOIC STRUCTURE 9MM UNCINATE, WITHIN MAIN PD SUGGESTIVE OF STONE He had pancreatic stent placed on  with resolution of his pain but possible returning back recently  2023: shared concern of diffuse dilated PD, possible stone vs neoplastic disease, sending for ca 19-9, new CTs, will discuss at GI tumor board and see him in couple weeks  : he was recently admitted to [facility] for severe pain with ERCP done with change of his PD on may , he feels better now, brushing during ERCP showed atypical cells, 2023-> MRI showed enlarged head of pancreas suggestive of mild localized pancreatitis  i discussed in details the potential of stone vs neoplastic process in head of pancreas, will need to discuss at GI tumor board and patient and his friend they have my cell to contact me in couple weeks to get the final decision, we discussed stone removal by various endoscopic technique and will discuss this with dr Resendiz,he has coming CT in early 2024: he had repeated Endoscopy and stenting both biliary and pancreatic ducts, he expereinced worsening o fhis pain we rediscussed whipple surgery as options for his recurrent pancreatitis with severe calcifications/stones at head of pancreas, will repeat CT CAP, discuss with dr Resendiz and at GI tumor board  2024: we rediscussed need to control pain and possible endoscopic interventions including lithotripsy and pros and cons for surgery(whipple)  2024: feels great after adjusting his diet and his last scope, he is going to see GI [facility] for lithotripsy eval Dr Elaine, exam unchanged, he saw dr Tali Braun who placed him on metformin and losartan  will see him in 4 weeks with new mri liver protocol for ? perfusion liver lesion  2024 : last ERCP 2024 with dr Resendiz that helped pain for 3 weeks then pain came back and went suddenly and last 10 days no pian, lost 6 pounds from last visit he still has stent in pancreatic duct  exam is unchanged,  he has an appt  for lithotripsy at [facility] Dr Elaine he has an appt with dr Resnediz  with dr Resendiz to replace his pancreatic stent he has not done MRI but like to have it done before lithotripsy   2024 : feels much better pain wise after celiac block with dr Resendiz, he had two lithotripsy with dr Elaine, he was told stone got smaller 2024 cytology is mild-mod atypia, 1-2/10 pain now, he is troubled after eating, weight stable,  sending for CEA/CA 19-9, amylase, lipase, A1C(ordered by dr Braun, sending for MRI urine cytology and analysis for hematuria he has small ring rash with clean center in RUQ toward midline, will monitor that and go to dermatology   2024 : gained three pounds, feels better, going for another lithotripsy of his panc duct stone 2024, EUS and brush cytology 2024-> atypical epithelial cells, 2024 cea = 6, ca19-9 normal, amylase normal and lipase mildly high 63(normal is 60),   MRI 2024->Unchanged mildly ectatic main pancreatic duct, dilated up to 0.4 cm, with no significant change in areas of irregularity and signal void which could correspond to calculi versus segmental structures in patient with chronic pancreatitis.  No evidence of acute inflammation or fluid collection.  No evidence of suspicious solid pancreatic mass. Unchanged cystic foci with apparent communication to main duct, measuring up to 0.8 cm, which may represent side branch IPMNs versus duct ectasia. panc fecal elastase is low in auust , will increase strength   will see him in January   the above plan of care with discussed in details to the patient and all questions were answered to patient satisfaction. patient instructed to follow up with the referring physician and patient primary care provider  A total of 45 minutes was spent on this visit, obtaining h/p,  reviewing previous notes/imaging/scopes, counseling the patient on possible etiology and  ordering tests (above), and documenting the findings in the note.

## 2024-09-25 NOTE — ASSESSMENT
[FreeTextEntry1] : AMY CARRILLO  is a pleasant 48 year year old man  who came in 2023 for pancreatic duct dilatation . He has Dr MIRA ETIENNE as His PCP.  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, smoking > 30 years i pack/day,  family hx: father  at 80s with CA, mother  at 40s from brain tumor and she had diabetes,   CT chest 2023: done for smoking history showed calcifications in pancreas and dilated PD to 10mm,  MRI 2023: 8MM PD 3/9/2023: EUS DILATED pd 5-7MM, HYPERECHOIC STRUCTURE 9MM UNCINATE, WITHIN MAIN PD SUGGESTIVE OF STONE He had pancreatic stent placed on  with resolution of his pain but possible returning back recently  2023: shared concern of diffuse dilated PD, possible stone vs neoplastic disease, sending for ca 19-9, new CTs, will discuss at GI tumor board and see him in couple weeks  : he was recently admitted to [facility] for severe pain with ERCP done with change of his PD on may , he feels better now, brushing during ERCP showed atypical cells, 2023-> MRI showed enlarged head of pancreas suggestive of mild localized pancreatitis  i discussed in details the potential of stone vs neoplastic process in head of pancreas, will need to discuss at GI tumor board and patient and his friend they have my cell to contact me in couple weeks to get the final decision, we discussed stone removal by various endoscopic technique and will discuss this with dr Resendiz,he has coming CT in early 2024: he had repeated Endoscopy and stenting both biliary and pancreatic ducts, he expereinced worsening o fhis pain we rediscussed whipple surgery as options for his recurrent pancreatitis with severe calcifications/stones at head of pancreas, will repeat CT CAP, discuss with dr Resendiz and at GI tumor board  2024: we rediscussed need to control pain and possible endoscopic interventions including lithotripsy and pros and cons for surgery(whipple)  2024: feels great after adjusting his diet and his last scope, he is going to see GI [facility] for lithotripsy eval Dr Elaine, exam unchanged, he saw dr Tali Braun who placed him on metformin and losartan  will see him in 4 weeks with new mri liver protocol for ? perfusion liver lesion  2024 : last ERCP 2024 with dr Resendiz that helped pain for 3 weeks then pain came back and went suddenly and last 10 days no pian, lost 6 pounds from last visit he still has stent in pancreatic duct  exam is unchanged,  he has an appt  for lithotripsy at [facility] Dr Elaine he has an appt with dr Resendiz  with dr Resendiz to replace his pancreatic stent he has not done MRI but like to have it done before lithotripsy   2024 : feels much better pain wise after celiac block with dr Resendiz, he had two lithotripsy with dr Elaine, he was told stone got smaller 2024 cytology is mild-mod atypia, 1-2/10 pain now, he is troubled after eating, weight stable,  sending for CEA/CA 19-9, amylase, lipase, A1C(ordered by dr Braun, sending for MRI urine cytology and analysis for hematuria he has small ring rash with clean center in RUQ toward midline, will monitor that and go to dermatology   2024 : gained three pounds, feels better, going for another lithotripsy of his panc duct stone 2024, EUS and brush cytology 2024-> atypical epithelial cells, 2024 cea = 6, ca19-9 normal, amylase normal and lipase mildly high 63(normal is 60),   MRI 2024->Unchanged mildly ectatic main pancreatic duct, dilated up to 0.4 cm, with no significant change in areas of irregularity and signal void which could correspond to calculi versus segmental structures in patient with chronic pancreatitis.  No evidence of acute inflammation or fluid collection.  No evidence of suspicious solid pancreatic mass. Unchanged cystic foci with apparent communication to main duct, measuring up to 0.8 cm, which may represent side branch IPMNs versus duct ectasia. panc fecal elastase is low in auust , will increase strength   will see him in January   the above plan of care with discussed in details to the patient and all questions were answered to patient satisfaction. patient instructed to follow up with the referring physician and patient primary care provider  A total of 45 minutes was spent on this visit, obtaining h/p,  reviewing previous notes/imaging/scopes, counseling the patient on possible etiology and  ordering tests (above), and documenting the findings in the note.

## 2024-09-26 ENCOUNTER — TRANSCRIPTION ENCOUNTER (OUTPATIENT)
Age: 49
End: 2024-09-26

## 2024-10-01 ENCOUNTER — TRANSCRIPTION ENCOUNTER (OUTPATIENT)
Age: 49
End: 2024-10-01

## 2024-10-02 ENCOUNTER — APPOINTMENT (OUTPATIENT)
Dept: GASTROENTEROLOGY | Facility: CLINIC | Age: 49
End: 2024-10-02

## 2024-10-02 DIAGNOSIS — Z78.9 OTHER SPECIFIED HEALTH STATUS: ICD-10-CM

## 2024-10-02 DIAGNOSIS — F17.200 NICOTINE DEPENDENCE, UNSPECIFIED, UNCOMPLICATED: ICD-10-CM

## 2024-10-02 DIAGNOSIS — R10.9 UNSPECIFIED ABDOMINAL PAIN: ICD-10-CM

## 2024-10-02 PROCEDURE — 99442: CPT

## 2024-10-07 ENCOUNTER — TRANSCRIPTION ENCOUNTER (OUTPATIENT)
Age: 49
End: 2024-10-07

## 2024-10-11 ENCOUNTER — RESULT REVIEW (OUTPATIENT)
Age: 49
End: 2024-10-11

## 2024-10-11 ENCOUNTER — TRANSCRIPTION ENCOUNTER (OUTPATIENT)
Age: 49
End: 2024-10-11

## 2024-10-11 ENCOUNTER — OUTPATIENT (OUTPATIENT)
Dept: OUTPATIENT SERVICES | Facility: HOSPITAL | Age: 49
LOS: 1 days | Discharge: HOME | End: 2024-10-11
Payer: COMMERCIAL

## 2024-10-11 VITALS
RESPIRATION RATE: 18 BRPM | WEIGHT: 149.91 LBS | SYSTOLIC BLOOD PRESSURE: 150 MMHG | TEMPERATURE: 97 F | HEIGHT: 69 IN | HEART RATE: 64 BPM | DIASTOLIC BLOOD PRESSURE: 83 MMHG

## 2024-10-11 VITALS
RESPIRATION RATE: 18 BRPM | DIASTOLIC BLOOD PRESSURE: 80 MMHG | OXYGEN SATURATION: 96 % | HEART RATE: 70 BPM | SYSTOLIC BLOOD PRESSURE: 121 MMHG

## 2024-10-11 DIAGNOSIS — K86.1 OTHER CHRONIC PANCREATITIS: ICD-10-CM

## 2024-10-11 DIAGNOSIS — K86.89 OTHER SPECIFIED DISEASES OF PANCREAS: ICD-10-CM

## 2024-10-11 DIAGNOSIS — Z98.890 OTHER SPECIFIED POSTPROCEDURAL STATES: Chronic | ICD-10-CM

## 2024-10-11 DIAGNOSIS — Z90.49 ACQUIRED ABSENCE OF OTHER SPECIFIED PARTS OF DIGESTIVE TRACT: Chronic | ICD-10-CM

## 2024-10-11 PROCEDURE — C1769: CPT

## 2024-10-11 PROCEDURE — 88305 TISSUE EXAM BY PATHOLOGIST: CPT | Mod: 26

## 2024-10-11 PROCEDURE — 88307 TISSUE EXAM BY PATHOLOGIST: CPT | Mod: 26

## 2024-10-11 PROCEDURE — 88173 CYTOPATH EVAL FNA REPORT: CPT

## 2024-10-11 PROCEDURE — 82365 CALCULUS SPECTROSCOPY: CPT

## 2024-10-11 PROCEDURE — 74018 RADEX ABDOMEN 1 VIEW: CPT

## 2024-10-11 PROCEDURE — 88173 CYTOPATH EVAL FNA REPORT: CPT | Mod: 26

## 2024-10-11 PROCEDURE — 88342 IMHCHEM/IMCYTCHM 1ST ANTB: CPT

## 2024-10-11 PROCEDURE — 43264 ERCP REMOVE DUCT CALCULI: CPT | Mod: XU

## 2024-10-11 PROCEDURE — 43262 ENDO CHOLANGIOPANCREATOGRAPH: CPT | Mod: XU

## 2024-10-11 PROCEDURE — 43273 ENDOSCOPIC PANCREATOSCOPY: CPT | Mod: XU

## 2024-10-11 PROCEDURE — C2617: CPT

## 2024-10-11 PROCEDURE — 88342 IMHCHEM/IMCYTCHM 1ST ANTB: CPT | Mod: 26

## 2024-10-11 PROCEDURE — 88307 TISSUE EXAM BY PATHOLOGIST: CPT

## 2024-10-11 PROCEDURE — 88300 SURGICAL PATH GROSS: CPT | Mod: 26,59

## 2024-10-11 PROCEDURE — C1889: CPT

## 2024-10-11 PROCEDURE — 88300 SURGICAL PATH GROSS: CPT

## 2024-10-11 PROCEDURE — 43274 ERCP DUCT STENT PLACEMENT: CPT | Mod: XU

## 2024-10-11 PROCEDURE — 74328 X-RAY BILE DUCT ENDOSCOPY: CPT | Mod: 26

## 2024-10-11 PROCEDURE — 88305 TISSUE EXAM BY PATHOLOGIST: CPT

## 2024-10-11 RX ORDER — TRIAMCINOLONE ACETONIDE 40 MG/ML
80 INJECTION, SUSPENSION INTRA-ARTICULAR; INTRAMUSCULAR ONCE
Refills: 0 | Status: DISCONTINUED | OUTPATIENT
Start: 2024-10-11 | End: 2024-10-11

## 2024-10-11 RX ORDER — INDOMETHACIN 25 MG
100 CAPSULE ORAL ONCE
Refills: 0 | Status: COMPLETED | OUTPATIENT
Start: 2024-10-11 | End: 2024-10-11

## 2024-10-11 RX ORDER — BUPIVACAINE HYDROCHLORIDE 5 MG/ML
8 INJECTION EPIDURAL; INTRACAUDAL; PERINEURAL ONCE
Refills: 0 | Status: COMPLETED | OUTPATIENT
Start: 2024-10-11 | End: 2024-10-11

## 2024-10-11 RX ORDER — PANCRELIPASE 8000; 30250; 28750 [USP'U]/1; [USP'U]/1; [USP'U]/1
0 CAPSULE, DELAYED RELEASE ORAL
Refills: 0 | DISCHARGE

## 2024-10-11 RX ADMIN — BUPIVACAINE HYDROCHLORIDE 8 MILLILITER(S): 5 INJECTION EPIDURAL; INTRACAUDAL; PERINEURAL at 12:25

## 2024-10-11 RX ADMIN — Medication 100 MILLIGRAM(S): at 12:25

## 2024-10-11 NOTE — ASU PATIENT PROFILE, ADULT - FALL HARM RISK - FACTORS NURSING JUDGEMENT
Detail Level: Detailed Depth Of Biopsy: dermis Was A Bandage Applied: Yes Size Of Lesion In Cm: 0 Biopsy Type: H and E Biopsy Method: Dermablade Anesthesia Type: 1% lidocaine with epinephrine Anesthesia Volume In Cc: 0.5 Hemostasis: Drysol Wound Care: Petrolatum Dressing: bandage Destruction After The Procedure: No Type Of Destruction Used: Curettage Curettage Text: The wound bed was treated with curettage after the biopsy was performed. Cryotherapy Text: The wound bed was treated with cryotherapy after the biopsy was performed. Electrodesiccation Text: The wound bed was treated with electrodesiccation after the biopsy was performed. Electrodesiccation And Curettage Text: The wound bed was treated with electrodesiccation and curettage after the biopsy was performed. Silver Nitrate Text: The wound bed was treated with silver nitrate after the biopsy was performed. Lab: -[id number] Consent: Written consent was obtained and risks were reviewed including but not limited to scarring, infection, bleeding, scabbing, incomplete removal, nerve damage and allergy to anesthesia. Post-Care Instructions: I reviewed with the patient in detail post-care instructions. Patient is to keep the biopsy site dry overnight, and then apply bacitracin twice daily until healed. Patient may apply hydrogen peroxide soaks to remove any crusting. Notification Instructions: Patient will be notified of biopsy results. However, patient instructed to call the office if not contacted within 2 weeks. Billing Type: Third-Party Bill Information: Selecting Yes will display possible errors in your note based on the variables you have selected. This validation is only offered as a suggestion for you. PLEASE NOTE THAT THE VALIDATION TEXT WILL BE REMOVED WHEN YOU FINALIZE YOUR NOTE. IF YOU WANT TO FAX A PRELIMINARY NOTE YOU WILL NEED TO TOGGLE THIS TO 'NO' IF YOU DO NOT WANT IT IN YOUR FAXED NOTE. No

## 2024-10-11 NOTE — REASON FOR VISIT
[Home] : at home, [unfilled] , at the time of the visit. [Medical Office: ([facility])___] : at the medical office located in  [Verbal consent obtained from patient] : the patient, [unfilled] [Post-op/Procedure: _________] : a [unfilled] post-op/procedure visit [FreeTextEntry4] : Sera

## 2024-10-11 NOTE — HISTORY OF PRESENT ILLNESS
[FreeTextEntry1] :  Patient is a 49-year-old male who initially came in for a dilated common bile duct and pancreatic duct on imaging, associated with symptomatic abdominal pain, postprandial pain and some weight loss for f/U today post repeat ERCP in which prior stents were removed and additional PD and CBD stents placed. Patient tolerated procedure well at the time. His case was discussed on tumor board along with options including surgical intervention. Patient presents today for f/u ERCP with stent change.   Patient had an ERCP on 9/9. During his ERCP, the stent was changed from 9 to 11cm . States he started having postprandial pain that is relieved by Percocet. States previously had a celiac nerve block that relieved his pain; however the pain has resumed after the ERCP.  Patient is scheduled for ESWL today at 1pm at [facility]. Patient states he is avoiding eating due to pain, and has lost 8 lbs since procedure.   Patient denies any, nausea, vomiting, dysphagia, heart burn, diarrhea, constipation, melena, hematochezia.     [de-identified] : 9/9/24

## 2024-10-11 NOTE — REASON FOR VISIT
[Home] : at home, [unfilled] , at the time of the visit. [Medical Office: ([facility])___] : at the medical office located in  [Verbal consent obtained from patient] : the patient, [unfilled] [Post-op/Procedure: _________] : a [unfilled] post-op/procedure visit [FreeTextEntry4] : Sera

## 2024-10-11 NOTE — REASON FOR VISIT
[Home] : at home, [unfilled] , at the time of the visit. [Medical Office: ([facility])___] : at the medical office located in  [Verbal consent obtained from patient] : the patient, [unfilled] [Post-op/Procedure: _________] : a [unfilled] post-op/procedure visit [FreeTextEntry4] : Sera

## 2024-10-11 NOTE — ASSESSMENT
[FreeTextEntry1] :  Patient is a 49-year-old male who initially came in for a dilated common bile duct and pancreatic duct on imaging, associated with symptomatic abdominal pain, postprandial pain and some weight loss for f/U today post repeat ERCP in which prior stents were removed and additional PD and CBD stents placed. Patient tolerated procedure well at the time. His case was discussed on tumor board along with options including surgical intervention. Patient presents today for f/u ERCP with stent change.   Chronic Pancreatitis with PD stones with chronic abdominal pain Enlarged GB with slow motility -S/P ERCP with stent exchange in the PD and stent placement in the CBD -Referred To Dr Elaine for lithotripsy of PD stones Scheduled for ERCP with [facility] after ESWL.  Follow up after procedure.

## 2024-10-11 NOTE — HISTORY OF PRESENT ILLNESS
[FreeTextEntry1] :  Patient is a 49-year-old male who initially came in for a dilated common bile duct and pancreatic duct on imaging, associated with symptomatic abdominal pain, postprandial pain and some weight loss for f/U today post repeat ERCP in which prior stents were removed and additional PD and CBD stents placed. Patient tolerated procedure well at the time. His case was discussed on tumor board along with options including surgical intervention. Patient presents today for f/u ERCP with stent change.   Patient had an ERCP on 9/9. During his ERCP, the stent was changed from 9 to 11cm . States he started having postprandial pain that is relieved by Percocet. States previously had a celiac nerve block that relieved his pain; however the pain has resumed after the ERCP.  Patient is scheduled for ESWL today at 1pm at [facility]. Patient states he is avoiding eating due to pain, and has lost 8 lbs since procedure.   Patient denies any, nausea, vomiting, dysphagia, heart burn, diarrhea, constipation, melena, hematochezia.     [de-identified] : 9/9/24

## 2024-10-11 NOTE — HISTORY OF PRESENT ILLNESS
[FreeTextEntry1] :  Patient is a 49-year-old male who initially came in for a dilated common bile duct and pancreatic duct on imaging, associated with symptomatic abdominal pain, postprandial pain and some weight loss for f/U today post repeat ERCP in which prior stents were removed and additional PD and CBD stents placed. Patient tolerated procedure well at the time. His case was discussed on tumor board along with options including surgical intervention. Patient presents today for f/u ERCP with stent change.   Patient had an ERCP on 9/9. During his ERCP, the stent was changed from 9 to 11cm . States he started having postprandial pain that is relieved by Percocet. States previously had a celiac nerve block that relieved his pain; however the pain has resumed after the ERCP.  Patient is scheduled for ESWL today at 1pm at [facility]. Patient states he is avoiding eating due to pain, and has lost 8 lbs since procedure.   Patient denies any, nausea, vomiting, dysphagia, heart burn, diarrhea, constipation, melena, hematochezia.     [de-identified] : 9/9/24

## 2024-10-11 NOTE — ASU DISCHARGE PLAN (ADULT/PEDIATRIC) - NS MD DC FALL RISK RISK
For information on Fall & Injury Prevention, visit: [email or web address] OR  [email or web address] OR  https://www.cdc.gov/steadi/patient.html

## 2024-10-16 LAB
NON-GYNECOLOGICAL CYTOLOGY STUDY: SIGNIFICANT CHANGE UP
SURGICAL PATHOLOGY STUDY: SIGNIFICANT CHANGE UP

## 2024-10-18 DIAGNOSIS — K86.1 OTHER CHRONIC PANCREATITIS: ICD-10-CM

## 2024-10-18 DIAGNOSIS — F17.210 NICOTINE DEPENDENCE, CIGARETTES, UNCOMPLICATED: ICD-10-CM

## 2024-10-18 DIAGNOSIS — I10 ESSENTIAL (PRIMARY) HYPERTENSION: ICD-10-CM

## 2024-10-18 DIAGNOSIS — E11.9 TYPE 2 DIABETES MELLITUS WITHOUT COMPLICATIONS: ICD-10-CM

## 2024-10-18 DIAGNOSIS — Z79.84 LONG TERM (CURRENT) USE OF ORAL HYPOGLYCEMIC DRUGS: ICD-10-CM

## 2024-10-18 DIAGNOSIS — B96.89 OTHER SPECIFIED BACTERIAL AGENTS AS THE CAUSE OF DISEASES CLASSIFIED ELSEWHERE: ICD-10-CM

## 2024-10-22 LAB
CELL MATERIAL STONE EST-MCNT: SIGNIFICANT CHANGE UP
NIDUS STONE QN: SIGNIFICANT CHANGE UP

## 2024-10-24 ENCOUNTER — TRANSCRIPTION ENCOUNTER (OUTPATIENT)
Age: 49
End: 2024-10-24

## 2024-12-11 RX ORDER — PANCRELIPASE LIPASE, PANCRELIPASE PROTEASE, PANCRELIPASE AMYLASE 252600; 60000; 189600 [USP'U]/1; [USP'U]/1; [USP'U]/1
60000-189600 CAPSULE, DELAYED RELEASE ORAL
Qty: 270 | Refills: 1 | Status: ACTIVE | COMMUNITY
Start: 2024-12-11 | End: 1900-01-01

## 2024-12-19 ENCOUNTER — TRANSCRIPTION ENCOUNTER (OUTPATIENT)
Age: 49
End: 2024-12-19

## 2024-12-19 ENCOUNTER — OUTPATIENT (OUTPATIENT)
Dept: OUTPATIENT SERVICES | Facility: HOSPITAL | Age: 49
LOS: 1 days | Discharge: HOME | End: 2024-12-19
Payer: COMMERCIAL

## 2024-12-19 VITALS
HEART RATE: 54 BPM | OXYGEN SATURATION: 97 % | SYSTOLIC BLOOD PRESSURE: 133 MMHG | RESPIRATION RATE: 18 BRPM | DIASTOLIC BLOOD PRESSURE: 80 MMHG

## 2024-12-19 VITALS
TEMPERATURE: 98 F | WEIGHT: 139.99 LBS | SYSTOLIC BLOOD PRESSURE: 123 MMHG | RESPIRATION RATE: 18 BRPM | DIASTOLIC BLOOD PRESSURE: 77 MMHG | HEART RATE: 82 BPM | HEIGHT: 69 IN | OXYGEN SATURATION: 97 %

## 2024-12-19 DIAGNOSIS — K86.89 OTHER SPECIFIED DISEASES OF PANCREAS: ICD-10-CM

## 2024-12-19 DIAGNOSIS — Z79.4 LONG TERM (CURRENT) USE OF INSULIN: ICD-10-CM

## 2024-12-19 DIAGNOSIS — Z79.84 LONG TERM (CURRENT) USE OF ORAL HYPOGLYCEMIC DRUGS: ICD-10-CM

## 2024-12-19 DIAGNOSIS — Z98.890 OTHER SPECIFIED POSTPROCEDURAL STATES: Chronic | ICD-10-CM

## 2024-12-19 DIAGNOSIS — M51.26 OTHER INTERVERTEBRAL DISC DISPLACEMENT, LUMBAR REGION: ICD-10-CM

## 2024-12-19 DIAGNOSIS — F17.210 NICOTINE DEPENDENCE, CIGARETTES, UNCOMPLICATED: ICD-10-CM

## 2024-12-19 DIAGNOSIS — Z90.49 ACQUIRED ABSENCE OF OTHER SPECIFIED PARTS OF DIGESTIVE TRACT: Chronic | ICD-10-CM

## 2024-12-19 DIAGNOSIS — E11.9 TYPE 2 DIABETES MELLITUS WITHOUT COMPLICATIONS: ICD-10-CM

## 2024-12-19 DIAGNOSIS — I10 ESSENTIAL (PRIMARY) HYPERTENSION: ICD-10-CM

## 2024-12-19 PROCEDURE — C1769: CPT

## 2024-12-19 PROCEDURE — C1889: CPT

## 2024-12-19 PROCEDURE — 43264 ERCP REMOVE DUCT CALCULI: CPT | Mod: XU

## 2024-12-19 PROCEDURE — 43275 ERCP REMOVE FORGN BODY DUCT: CPT

## 2024-12-19 PROCEDURE — 74018 RADEX ABDOMEN 1 VIEW: CPT

## 2024-12-19 PROCEDURE — C9399: CPT

## 2024-12-19 PROCEDURE — 74329 X-RAY FOR PANCREAS ENDOSCOPY: CPT | Mod: 26

## 2024-12-19 NOTE — ASU DISCHARGE PLAN (ADULT/PEDIATRIC) - FINANCIAL ASSISTANCE
[facility] provides services at a reduced cost to those who are determined to be eligible through [facility]’s financial assistance program. Information regarding [facility]’s financial assistance program can be found by going to [email or web address] or by calling [phone].

## 2024-12-19 NOTE — CHART NOTE - NSCHARTNOTEFT_GEN_A_CORE
PACU ANESTHESIA ADMISSION NOTE      Procedure:   Post op diagnosis:      ____  Intubated  TV:______       Rate: ______      FiO2: ______    __x__  Patent Airway    __x__  Full return of protective reflexes    __x__  Full recovery from anesthesia / back to baseline status    Vitals:  T(C): 36.8 (12-19-24 @ 10:46), Max: 36.8 (12-19-24 @ 10:46)  HR: 64 (12-19-24 @ 10:56) (64 - 82)  BP: 105/69 (12-19-24 @ 10:56) (102/67 - 123/77)  RR: 18 (12-19-24 @ 10:56) (18 - 18)  SpO2: 96% (12-19-24 @ 10:56) (96% - 97%)    Mental Status:  __x__ Awake   ___x__ Alert   _____ Drowsy   _____ Sedated    Nausea/Vomiting:  __x__ NO  ______Yes,   See Post - Op Orders          Pain Scale (0-10):  __0___    Treatment: ____ None    __x__ See Post - Op/PCA Orders    Post - Operative Fluids:   ____ Oral   __x__ See Post - Op Orders    Plan: Discharge:   ___x_Home       _____Floor     _____Critical Care    _____  Other:_________________    Comments: Patient had smooth intraoperative event, no anesthesia complication.  PACU Vital signs: HR:  65          BP:    124    / 65         RR:   14          O2 Sat: 99     %     Temp 97.5f

## 2024-12-19 NOTE — ASU DISCHARGE PLAN (ADULT/PEDIATRIC) - NS MD DC FALL RISK RISK
For information on Fall & Injury Prevention, visit: [email or web address] OR  [email or web address] OR  https://www.cdc.gov/steadi/patient.html

## 2024-12-19 NOTE — ASU DISCHARGE PLAN (ADULT/PEDIATRIC) - CALL YOUR DOCTOR IF YOU HAVE ANY OF THE FOLLOWING:
Bleeding that does not stop/Swelling that gets worse/Pain not relieved by Medications/Numbness, tingling, color or temperature change to extremity/Inability to tolerate liquids or foods

## 2024-12-19 NOTE — ASU PATIENT PROFILE, ADULT - PAIN SCALE PREFERRED, PROFILE
[facility]  Progress Note    Amy Gino Patient Status:  Inpatient    1977 MRN [id number]   Location [facility] 2W/SW Attending Shanta Yang,    Hosp Day # 1 PCP [facility]     Assessment:    1.  CAD.   Acute anterior w Extremities: Without clubbing, cyanosis or edema. Peripheral pulses are 2+. Right groin site soft, nontender  Skin: Warm and dry.      Labs:  Lab Results   Component Value Date    WBC 12.0 12/20/2019    HGB 13.3 12/20/2019    HCT 37.7 12/20/2019    PLT numerical 0-10

## 2025-01-02 NOTE — ASU DISCHARGE PLAN (ADULT/PEDIATRIC) - HAVE YOU HAD COVID IN THE LAST 60 DAYS?
Hepatobiliary and Pancreatic Surgery Progress Note    CC: Follow-up emergency room visit    Subjective: Patient states that she is doing well.  She has had Y90 and Lutathera in the past she is still having about 4 bowel movements a day.   She is currently on Cap/Tem and still having 4 bowel movements a day. She is having some stomach cramping.  She is scheduled for a repeat TACE January 9.  She had a recent PET scan and see me in follow-up.      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 Hu  -Cussed the case with Dr. Elliott who is planning on performing a repeat TACE procedure.  - I reviewed their images prior to our office visit and we also reviewed them together today.  -We compared her recent dotatate Pet compared to the one that she had in February.  There is marked improvement in her PET scan  -Recommend continued course of action, the patient was very happy to see the results of the treatment since February.    20 Minutes of which greater than 50% was spent counseling or coordinating her care.    Thank you Dr. Montana for the consultation and allowing me to take part in Ms. Olmstead's care.      Lambert Castro MD 1/2/2025 9:38 AM      No

## 2025-01-03 ENCOUNTER — TRANSCRIPTION ENCOUNTER (OUTPATIENT)
Age: 50
End: 2025-01-03

## 2025-01-08 ENCOUNTER — TRANSCRIPTION ENCOUNTER (OUTPATIENT)
Age: 50
End: 2025-01-08

## 2025-01-13 ENCOUNTER — APPOINTMENT (OUTPATIENT)
Dept: ENDOCRINOLOGY | Facility: CLINIC | Age: 50
End: 2025-01-13
Payer: COMMERCIAL

## 2025-01-13 VITALS
DIASTOLIC BLOOD PRESSURE: 86 MMHG | SYSTOLIC BLOOD PRESSURE: 138 MMHG | WEIGHT: 150 LBS | HEART RATE: 73 BPM | BODY MASS INDEX: 22.22 KG/M2 | OXYGEN SATURATION: 98 % | HEIGHT: 69 IN

## 2025-01-13 DIAGNOSIS — E11.9 TYPE 2 DIABETES MELLITUS W/OUT COMPLICATIONS: ICD-10-CM

## 2025-01-13 DIAGNOSIS — K86.1 OTHER CHRONIC PANCREATITIS: ICD-10-CM

## 2025-01-13 PROCEDURE — 99213 OFFICE O/P EST LOW 20 MIN: CPT

## 2025-01-22 ENCOUNTER — NON-APPOINTMENT (OUTPATIENT)
Age: 50
End: 2025-01-22

## 2025-01-22 ENCOUNTER — APPOINTMENT (OUTPATIENT)
Dept: GASTROENTEROLOGY | Facility: CLINIC | Age: 50
End: 2025-01-22
Payer: COMMERCIAL

## 2025-01-22 VITALS — HEIGHT: 69 IN | WEIGHT: 153.4 LBS | BODY MASS INDEX: 22.72 KG/M2

## 2025-01-22 DIAGNOSIS — Z78.9 OTHER SPECIFIED HEALTH STATUS: ICD-10-CM

## 2025-01-22 DIAGNOSIS — F17.200 NICOTINE DEPENDENCE, UNSPECIFIED, UNCOMPLICATED: ICD-10-CM

## 2025-01-22 DIAGNOSIS — R10.9 UNSPECIFIED ABDOMINAL PAIN: ICD-10-CM

## 2025-01-22 PROCEDURE — 99213 OFFICE O/P EST LOW 20 MIN: CPT

## 2025-01-22 RX ORDER — MULTIVITAMIN
TABLET ORAL
Refills: 0 | Status: ACTIVE | COMMUNITY

## 2025-01-28 ENCOUNTER — APPOINTMENT (OUTPATIENT)
Dept: SURGERY | Facility: CLINIC | Age: 50
End: 2025-01-28
Payer: COMMERCIAL

## 2025-01-28 VITALS
HEART RATE: 90 BPM | SYSTOLIC BLOOD PRESSURE: 132 MMHG | BODY MASS INDEX: 22.96 KG/M2 | OXYGEN SATURATION: 96 % | WEIGHT: 155 LBS | DIASTOLIC BLOOD PRESSURE: 80 MMHG | TEMPERATURE: 97 F | HEIGHT: 69 IN

## 2025-01-28 DIAGNOSIS — K86.89 OTHER SPECIFIED DISEASES OF PANCREAS: ICD-10-CM

## 2025-01-28 PROCEDURE — 99214 OFFICE O/P EST MOD 30 MIN: CPT

## 2025-03-26 ENCOUNTER — TRANSCRIPTION ENCOUNTER (OUTPATIENT)
Age: 50
End: 2025-03-26

## 2025-03-27 ENCOUNTER — TRANSCRIPTION ENCOUNTER (OUTPATIENT)
Age: 50
End: 2025-03-27

## 2025-03-28 ENCOUNTER — TRANSCRIPTION ENCOUNTER (OUTPATIENT)
Age: 50
End: 2025-03-28

## 2025-03-28 ENCOUNTER — INPATIENT (INPATIENT)
Facility: HOSPITAL | Age: 50
LOS: 2 days | Discharge: HOME | DRG: 392 | End: 2025-03-31
Attending: STUDENT IN AN ORGANIZED HEALTH CARE EDUCATION/TRAINING PROGRAM | Admitting: STUDENT IN AN ORGANIZED HEALTH CARE EDUCATION/TRAINING PROGRAM
Payer: COMMERCIAL

## 2025-03-28 VITALS
SYSTOLIC BLOOD PRESSURE: 165 MMHG | HEART RATE: 83 BPM | RESPIRATION RATE: 18 BRPM | WEIGHT: 153 LBS | TEMPERATURE: 98 F | DIASTOLIC BLOOD PRESSURE: 101 MMHG | OXYGEN SATURATION: 98 %

## 2025-03-28 DIAGNOSIS — Z98.890 OTHER SPECIFIED POSTPROCEDURAL STATES: Chronic | ICD-10-CM

## 2025-03-28 DIAGNOSIS — Z90.49 ACQUIRED ABSENCE OF OTHER SPECIFIED PARTS OF DIGESTIVE TRACT: Chronic | ICD-10-CM

## 2025-03-28 LAB
HCT VFR BLD CALC: 38.5 % — LOW (ref 42–52)
HGB BLD-MCNC: 12.8 G/DL — LOW (ref 14–18)
MCHC RBC-ENTMCNC: 29.3 PG — SIGNIFICANT CHANGE UP (ref 27–31)
MCHC RBC-ENTMCNC: 33.2 G/DL — SIGNIFICANT CHANGE UP (ref 32–37)
MCV RBC AUTO: 88.1 FL — SIGNIFICANT CHANGE UP (ref 80–94)
PLATELET # BLD AUTO: 289 K/UL — SIGNIFICANT CHANGE UP (ref 130–400)
PMV BLD: 11.7 FL — HIGH (ref 7.4–10.4)
RBC # BLD: 4.37 M/UL — LOW (ref 4.7–6.1)
RBC # FLD: 14.3 % — SIGNIFICANT CHANGE UP (ref 11.5–14.5)
WBC # BLD: 16.38 K/UL — HIGH (ref 4.8–10.8)
WBC # FLD AUTO: 16.38 K/UL — HIGH (ref 4.8–10.8)

## 2025-03-28 PROCEDURE — 99285 EMERGENCY DEPT VISIT HI MDM: CPT

## 2025-03-28 RX ORDER — ONDANSETRON HCL/PF 4 MG/2 ML
4 VIAL (ML) INJECTION ONCE
Refills: 0 | Status: COMPLETED | OUTPATIENT
Start: 2025-03-28 | End: 2025-03-29

## 2025-03-28 RX ORDER — SODIUM CHLORIDE 9 G/1000ML
2000 INJECTION, SOLUTION INTRAVENOUS ONCE
Refills: 0 | Status: COMPLETED | OUTPATIENT
Start: 2025-03-28 | End: 2025-03-28

## 2025-03-28 RX ADMIN — Medication 4 MILLIGRAM(S): at 22:46

## 2025-03-28 RX ADMIN — Medication 20 MILLIGRAM(S): at 22:46

## 2025-03-28 RX ADMIN — SODIUM CHLORIDE 2000 MILLILITER(S): 9 INJECTION, SOLUTION INTRAVENOUS at 22:47

## 2025-03-28 NOTE — ED PROVIDER NOTE - CLINICAL SUMMARY MEDICAL DECISION MAKING FREE TEXT BOX
50-year-old male with a past medical history of recurrent pancreatitis, cholelithiasis, ERCP with biliary stents, sphincterotomy, presents with upper abdominal pain associated with nausea and vomiting, chills for a week but worse today.  No GI bleed symptoms.  Denies fever.  No chest pain.  On exam nontoxic, vital signs noted, normal work of breathing, abdomen soft, nondistended, epigastric and right upper quadrant tenderness, no rebound or guarding.  Labs overall reassuring aside from mild leukocytosis.  CT with no acute abnormalities but signs of chronic pancreatitis.  Also has new pancreatic cyst.  Dilated CBD on ultrasound.  Patient persistently has pain despite multiple rounds of pain medication.  Will admit to medicine for further monitoring and treatment

## 2025-03-28 NOTE — ED ADULT TRIAGE NOTE - WEIGHT IN KG
Problem: Adult Inpatient Plan of Care  Goal: Plan of Care Review  Outcome: Ongoing, Progressing  Goal: Patient-Specific Goal (Individualized)  Outcome: Ongoing, Progressing  Goal: Absence of Hospital-Acquired Illness or Injury  Outcome: Ongoing, Progressing  Goal: Optimal Comfort and Wellbeing  Outcome: Ongoing, Progressing  Goal: Readiness for Transition of Care  Outcome: Ongoing, Progressing     Problem: Infection  Goal: Absence of Infection Signs and Symptoms  Outcome: Ongoing, Progressing     Problem: Fall Injury Risk  Goal: Absence of Fall and Fall-Related Injury  Outcome: Ongoing, Progressing      69.4

## 2025-03-28 NOTE — ED PROVIDER NOTE - NS ED ROS FT
Constitutional: No fever, chills.  Eyes: No visual changes.  ENT: No hearing changes.  Neck: No neck pain or stiffness.  Cardiovascular: No chest pain, palpitations, edema.  Pulmonary: No SOB, cough. No hemoptysis.  Abdominal:  see HPI  : No dysuria, frequency.  Neuro: No headache, syncope, dizziness.  MS: No back pain. No calf pain/swelling.  Psych: No suicidal ideations.

## 2025-03-28 NOTE — ED PROVIDER NOTE - PHYSICAL EXAMINATION
Constitutional: Well developed, well nourished. NAD  Head: Normocephalic, atraumatic.  Eyes: PERRL, EOMI.  ENT: No nasal discharge. Mucous membranes dry.  Neck: Supple. Painless ROM.  Cardiovascular:   Regular rate and rhythm.    Pulmonary: Lungs clear to auscultation bilaterally.   Abdominal: Soft mild tenderness noted to RUQ; no rebound, guarding or flank pain;.  Extremities. Pelvis stable. No lower extremity edema, symmetric calves.  Skin: No rashes, cyanosis.  Neuro: AAOx3. No focal neurological deficits.  Psych: Normal mood. Normal affect.

## 2025-03-28 NOTE — ED ADULT TRIAGE NOTE - CHIEF COMPLAINT QUOTE
pt c/o right side abdominal pain, nausea, chills and decreased appetite since yesterday   pt has hx stents placed in gallbladder and pancreas, removed in december

## 2025-03-28 NOTE — ED PROVIDER NOTE - OBJECTIVE STATEMENT
50 yold male to ED [facility] Pancreatitis, gallstones - previously had ercp with biliary stents and sphincterotomy by Dr. Post; stents eventually removed 12/24; pt now presents to Ed c/o RUQ/epigastric abdominal pain sharp with n/v, chills x 1 week; pt still has GB; pt denies alcohol use; denies fever, brbpr/melena;

## 2025-03-28 NOTE — ED ADULT NURSE NOTE - BIRTH SEX
DISCHARGE SUMMARY from Nurse    PATIENT INSTRUCTIONS:    After general anesthesia or intravenous sedation, for 24 hours or while taking prescription Narcotics:  · Limit your activities  · Do not drive and operate hazardous machinery  · Do not make important personal or business decisions  · Do  not drink alcoholic beverages  · If you have not urinated within 8 hours after discharge, please contact your surgeon on call. Report the following to your surgeon:  · Excessive pain, swelling, redness or odor of or around the surgical area  · Temperature over 100.5  · Nausea and vomiting lasting longer than 4 hours or if unable to take medications  · Any signs of decreased circulation or nerve impairment to extremity: change in color, persistent  numbness, tingling, coldness or increase pain  · Any questions    What to do at Home:  Recommended activity: Activity as tolerated. If you experience any of the following symptoms bleeding,chest pain please follow up with your doctor. *  Please give a list of your current medications to your Primary Care Provider. *  Please update this list whenever your medications are discontinued, doses are      changed, or new medications (including over-the-counter products) are added. *  Please carry medication information at all times in case of emergency situations. These are general instructions for a healthy lifestyle:    No smoking/ No tobacco products/ Avoid exposure to second hand smoke  Surgeon General's Warning:  Quitting smoking now greatly reduces serious risk to your health.     Obesity, smoking, and sedentary lifestyle greatly increases your risk for illness    A healthy diet, regular physical exercise & weight monitoring are important for maintaining a healthy lifestyle    You may be retaining fluid if you have a history of heart failure or if you experience any of the following symptoms:  Weight gain of 3 pounds or more overnight or 5 pounds in a week, increased swelling in our hands or feet or shortness of breath while lying flat in bed. Please call your doctor as soon as you notice any of these symptoms; do not wait until your next office visit. Recognize signs and symptoms of STROKE:    F-face looks uneven    A-arms unable to move or move unevenly    S-speech slurred or non-existent    T-time-call 911 as soon as signs and symptoms begin-DO NOT go       Back to bed or wait to see if you get better-TIME IS BRAIN. Warning Signs of HEART ATTACK     Call 911 if you have these symptoms:   Chest discomfort. Most heart attacks involve discomfort in the center of the chest that lasts more than a few minutes, or that goes away and comes back. It can feel like uncomfortable pressure, squeezing, fullness, or pain.  Discomfort in other areas of the upper body. Symptoms can include pain or discomfort in one or both arms, the back, neck, jaw, or stomach.  Shortness of breath with or without chest discomfort.  Other signs may include breaking out in a cold sweat, nausea, or lightheadedness. Don't wait more than five minutes to call 911 - MINUTES MATTER! Fast action can save your life. Calling 911 is almost always the fastest way to get lifesaving treatment. Emergency Medical Services staff can begin treatment when they arrive -- up to an hour sooner than if someone gets to the hospital by car. The discharge information has been reviewed with the patient and spouse. The patient and spouse verbalized understanding. Discharge medications reviewed with the patient and spouse                 Cardiac Catheterization/Angiography Discharge Instructions    *Check the puncture site frequently for swelling or bleeding. If you see any bleeding, lie down and apply pressure over the area with a clean town or washcloth. Notify your doctor for any redness, swelling, drainage or oozing from the puncture site. Notify your doctor for any fever or chills.     *If the leg or arm with the puncture becomes cold, numb or painful, call Dr Willis Lóepz at  [phone]    *Activity should be limited for the next 48 hours. Climb stairs as little as possible and avoid any stooping, bending or strenuous activity for 48 hours. No heavy lifting (anything over 10 pounds) for three days. *Do not drive for 48 hours. *You may resume your usual diet. Drink more fluids than usual.    *Have a responsible person drive you home and stay with you for at least 24 hours after your heart catheterization/angiography. *You may remove the bandage from your Right wrist in 24 hours. You may shower in 24 hours. No tub baths, hot tubs or swimming for one week. Do not place any lotions, creams, powders, ointments over the puncture site for one week. You may place a clean band-aid over the puncture site each day for 5 days. Change this daily. and appropriate educational materials and side effects teaching were provided.   ___________________________________________________________________________________________________________________________________ Male

## 2025-03-29 DIAGNOSIS — R10.9 UNSPECIFIED ABDOMINAL PAIN: ICD-10-CM

## 2025-03-29 LAB
ALBUMIN SERPL ELPH-MCNC: 4.1 G/DL — SIGNIFICANT CHANGE UP (ref 3.5–5.2)
ALP SERPL-CCNC: 83 U/L — SIGNIFICANT CHANGE UP (ref 30–115)
ALT FLD-CCNC: 13 U/L — SIGNIFICANT CHANGE UP (ref 0–41)
ANION GAP SERPL CALC-SCNC: 12 MMOL/L — SIGNIFICANT CHANGE UP (ref 7–14)
ANION GAP SERPL CALC-SCNC: 13 MMOL/L — SIGNIFICANT CHANGE UP (ref 7–14)
AST SERPL-CCNC: 15 U/L — SIGNIFICANT CHANGE UP (ref 0–41)
BASOPHILS # BLD AUTO: 0.1 K/UL — SIGNIFICANT CHANGE UP (ref 0–0.2)
BASOPHILS NFR BLD AUTO: 0.6 % — SIGNIFICANT CHANGE UP (ref 0–1)
BILIRUB SERPL-MCNC: 0.3 MG/DL — SIGNIFICANT CHANGE UP (ref 0.2–1.2)
BLD GP AB SCN SERPL QL: SIGNIFICANT CHANGE UP
BUN SERPL-MCNC: 12 MG/DL — SIGNIFICANT CHANGE UP (ref 10–20)
BUN SERPL-MCNC: 7 MG/DL — LOW (ref 10–20)
CALCIUM SERPL-MCNC: 9.6 MG/DL — SIGNIFICANT CHANGE UP (ref 8.4–10.5)
CALCIUM SERPL-MCNC: 9.6 MG/DL — SIGNIFICANT CHANGE UP (ref 8.4–10.5)
CHLORIDE SERPL-SCNC: 100 MMOL/L — SIGNIFICANT CHANGE UP (ref 98–110)
CHLORIDE SERPL-SCNC: 98 MMOL/L — SIGNIFICANT CHANGE UP (ref 98–110)
CO2 SERPL-SCNC: 26 MMOL/L — SIGNIFICANT CHANGE UP (ref 17–32)
CO2 SERPL-SCNC: 27 MMOL/L — SIGNIFICANT CHANGE UP (ref 17–32)
CREAT SERPL-MCNC: 0.8 MG/DL — SIGNIFICANT CHANGE UP (ref 0.7–1.5)
CREAT SERPL-MCNC: 0.9 MG/DL — SIGNIFICANT CHANGE UP (ref 0.7–1.5)
EGFR: 103 ML/MIN/1.73M2 — SIGNIFICANT CHANGE UP
EGFR: 103 ML/MIN/1.73M2 — SIGNIFICANT CHANGE UP
EGFR: 106 ML/MIN/1.73M2 — SIGNIFICANT CHANGE UP
EGFR: 106 ML/MIN/1.73M2 — SIGNIFICANT CHANGE UP
EOSINOPHIL # BLD AUTO: 0.17 K/UL — SIGNIFICANT CHANGE UP (ref 0–0.7)
EOSINOPHIL NFR BLD AUTO: 1 % — SIGNIFICANT CHANGE UP (ref 0–8)
GLUCOSE BLDC GLUCOMTR-MCNC: 107 MG/DL — HIGH (ref 70–99)
GLUCOSE BLDC GLUCOMTR-MCNC: 118 MG/DL — HIGH (ref 70–99)
GLUCOSE BLDC GLUCOMTR-MCNC: 139 MG/DL — HIGH (ref 70–99)
GLUCOSE BLDC GLUCOMTR-MCNC: 145 MG/DL — HIGH (ref 70–99)
GLUCOSE SERPL-MCNC: 112 MG/DL — HIGH (ref 70–99)
GLUCOSE SERPL-MCNC: 215 MG/DL — HIGH (ref 70–99)
IMM GRANULOCYTES NFR BLD AUTO: 0.4 % — HIGH (ref 0.1–0.3)
LIDOCAIN IGE QN: 10 U/L — SIGNIFICANT CHANGE UP (ref 7–60)
LYMPHOCYTES # BLD AUTO: 17.5 % — LOW (ref 20.5–51.1)
LYMPHOCYTES # BLD AUTO: 2.86 K/UL — SIGNIFICANT CHANGE UP (ref 1.2–3.4)
MONOCYTES # BLD AUTO: 1.6 K/UL — HIGH (ref 0.1–0.6)
MONOCYTES NFR BLD AUTO: 9.8 % — HIGH (ref 1.7–9.3)
NEUTROPHILS # BLD AUTO: 11.58 K/UL — HIGH (ref 1.4–6.5)
NEUTROPHILS NFR BLD AUTO: 70.7 % — SIGNIFICANT CHANGE UP (ref 42.2–75.2)
NRBC BLD AUTO-RTO: 0 /100 WBCS — SIGNIFICANT CHANGE UP (ref 0–0)
POTASSIUM SERPL-MCNC: 4.2 MMOL/L — SIGNIFICANT CHANGE UP (ref 3.5–5)
POTASSIUM SERPL-MCNC: 5.2 MMOL/L — HIGH (ref 3.5–5)
POTASSIUM SERPL-SCNC: 4.2 MMOL/L — SIGNIFICANT CHANGE UP (ref 3.5–5)
POTASSIUM SERPL-SCNC: 5.2 MMOL/L — HIGH (ref 3.5–5)
PROT SERPL-MCNC: 7 G/DL — SIGNIFICANT CHANGE UP (ref 6–8)
SODIUM SERPL-SCNC: 137 MMOL/L — SIGNIFICANT CHANGE UP (ref 135–146)
SODIUM SERPL-SCNC: 139 MMOL/L — SIGNIFICANT CHANGE UP (ref 135–146)

## 2025-03-29 PROCEDURE — 83735 ASSAY OF MAGNESIUM: CPT

## 2025-03-29 PROCEDURE — 36415 COLL VENOUS BLD VENIPUNCTURE: CPT

## 2025-03-29 PROCEDURE — 88173 CYTOPATH EVAL FNA REPORT: CPT

## 2025-03-29 PROCEDURE — 88312 SPECIAL STAINS GROUP 1: CPT

## 2025-03-29 PROCEDURE — 99223 1ST HOSP IP/OBS HIGH 75: CPT

## 2025-03-29 PROCEDURE — 83036 HEMOGLOBIN GLYCOSYLATED A1C: CPT

## 2025-03-29 PROCEDURE — 76705 ECHO EXAM OF ABDOMEN: CPT | Mod: 26

## 2025-03-29 PROCEDURE — 80053 COMPREHEN METABOLIC PANEL: CPT

## 2025-03-29 PROCEDURE — 88305 TISSUE EXAM BY PATHOLOGIST: CPT

## 2025-03-29 PROCEDURE — 82962 GLUCOSE BLOOD TEST: CPT

## 2025-03-29 PROCEDURE — 82378 CARCINOEMBRYONIC ANTIGEN: CPT

## 2025-03-29 PROCEDURE — 82945 GLUCOSE OTHER FLUID: CPT

## 2025-03-29 PROCEDURE — 80061 LIPID PANEL: CPT

## 2025-03-29 PROCEDURE — 99222 1ST HOSP IP/OBS MODERATE 55: CPT

## 2025-03-29 PROCEDURE — 80048 BASIC METABOLIC PNL TOTAL CA: CPT

## 2025-03-29 PROCEDURE — 74177 CT ABD & PELVIS W/CONTRAST: CPT | Mod: 26

## 2025-03-29 PROCEDURE — 93010 ELECTROCARDIOGRAM REPORT: CPT

## 2025-03-29 PROCEDURE — 85025 COMPLETE CBC W/AUTO DIFF WBC: CPT

## 2025-03-29 PROCEDURE — 82150 ASSAY OF AMYLASE: CPT

## 2025-03-29 PROCEDURE — 93005 ELECTROCARDIOGRAM TRACING: CPT

## 2025-03-29 RX ORDER — SODIUM CHLORIDE 9 G/1000ML
1000 INJECTION, SOLUTION INTRAVENOUS
Refills: 0 | Status: DISCONTINUED | OUTPATIENT
Start: 2025-03-29 | End: 2025-03-31

## 2025-03-29 RX ORDER — DEXTROSE 50 % IN WATER 50 %
12.5 SYRINGE (ML) INTRAVENOUS ONCE
Refills: 0 | Status: DISCONTINUED | OUTPATIENT
Start: 2025-03-29 | End: 2025-03-31

## 2025-03-29 RX ORDER — OXYCODONE HYDROCHLORIDE 30 MG/1
20 TABLET ORAL
Refills: 0 | Status: DISCONTINUED | OUTPATIENT
Start: 2025-03-29 | End: 2025-03-31

## 2025-03-29 RX ORDER — ONDANSETRON HCL/PF 4 MG/2 ML
4 VIAL (ML) INJECTION EVERY 8 HOURS
Refills: 0 | Status: DISCONTINUED | OUTPATIENT
Start: 2025-03-29 | End: 2025-03-31

## 2025-03-29 RX ORDER — LOSARTAN POTASSIUM 100 MG/1
25 TABLET, FILM COATED ORAL DAILY
Refills: 0 | Status: DISCONTINUED | OUTPATIENT
Start: 2025-03-29 | End: 2025-03-31

## 2025-03-29 RX ORDER — ACETAMINOPHEN 500 MG/5ML
650 LIQUID (ML) ORAL
Refills: 0 | Status: DISCONTINUED | OUTPATIENT
Start: 2025-03-29 | End: 2025-03-29

## 2025-03-29 RX ORDER — MAGNESIUM, ALUMINUM HYDROXIDE 200-200 MG
30 TABLET,CHEWABLE ORAL EVERY 4 HOURS
Refills: 0 | Status: DISCONTINUED | OUTPATIENT
Start: 2025-03-29 | End: 2025-03-31

## 2025-03-29 RX ORDER — ENOXAPARIN SODIUM 100 MG/ML
40 INJECTION SUBCUTANEOUS EVERY 24 HOURS
Refills: 0 | Status: COMPLETED | OUTPATIENT
Start: 2025-03-29 | End: 2025-03-30

## 2025-03-29 RX ORDER — DEXTROSE 50 % IN WATER 50 %
25 SYRINGE (ML) INTRAVENOUS ONCE
Refills: 0 | Status: DISCONTINUED | OUTPATIENT
Start: 2025-03-29 | End: 2025-03-31

## 2025-03-29 RX ORDER — INSULIN LISPRO 100 U/ML
INJECTION, SOLUTION INTRAVENOUS; SUBCUTANEOUS
Refills: 0 | Status: DISCONTINUED | OUTPATIENT
Start: 2025-03-29 | End: 2025-03-31

## 2025-03-29 RX ORDER — OXYCODONE HYDROCHLORIDE AND ACETAMINOPHEN 10; 325 MG/1; MG/1
4 TABLET ORAL
Refills: 0 | Status: DISCONTINUED | OUTPATIENT
Start: 2025-03-29 | End: 2025-03-29

## 2025-03-29 RX ORDER — MELATONIN 5 MG
3 TABLET ORAL AT BEDTIME
Refills: 0 | Status: DISCONTINUED | OUTPATIENT
Start: 2025-03-29 | End: 2025-03-31

## 2025-03-29 RX ORDER — INFLUENZA A VIRUS A/IDAHO/07/2018 (H1N1) ANTIGEN (MDCK CELL DERIVED, PROPIOLACTONE INACTIVATED, INFLUENZA A VIRUS A/INDIANA/08/2018 (H3N2) ANTIGEN (MDCK CELL DERIVED, PROPIOLACTONE INACTIVATED), INFLUENZA B VIRUS B/SINGAPORE/INFTT-16-0610/2016 ANTIGEN (MDCK CELL DERIVED, PROPIOLACTONE INACTIVATED), INFLUENZA B VIRUS B/IOWA/06/2017 ANTIGEN (MDCK CELL DERIVED, PROPIOLACTONE INACTIVATED) 15; 15; 15; 15 UG/.5ML; UG/.5ML; UG/.5ML; UG/.5ML
0.5 INJECTION, SUSPENSION INTRAMUSCULAR ONCE
Refills: 0 | Status: DISCONTINUED | OUTPATIENT
Start: 2025-03-29 | End: 2025-03-31

## 2025-03-29 RX ORDER — ACETAMINOPHEN 500 MG/5ML
650 LIQUID (ML) ORAL EVERY 6 HOURS
Refills: 0 | Status: DISCONTINUED | OUTPATIENT
Start: 2025-03-29 | End: 2025-03-29

## 2025-03-29 RX ORDER — DEXTROSE 50 % IN WATER 50 %
15 SYRINGE (ML) INTRAVENOUS ONCE
Refills: 0 | Status: DISCONTINUED | OUTPATIENT
Start: 2025-03-29 | End: 2025-03-31

## 2025-03-29 RX ORDER — GLUCAGON 3 MG/1
1 POWDER NASAL ONCE
Refills: 0 | Status: DISCONTINUED | OUTPATIENT
Start: 2025-03-29 | End: 2025-03-31

## 2025-03-29 RX ORDER — HYDROMORPHONE/SOD CHLOR,ISO/PF 2 MG/10 ML
0.5 SYRINGE (ML) INJECTION EVERY 4 HOURS
Refills: 0 | Status: DISCONTINUED | OUTPATIENT
Start: 2025-03-29 | End: 2025-03-31

## 2025-03-29 RX ADMIN — SODIUM CHLORIDE 150 MILLILITER(S): 9 INJECTION, SOLUTION INTRAVENOUS at 04:34

## 2025-03-29 RX ADMIN — Medication 6 MILLIGRAM(S): at 03:59

## 2025-03-29 RX ADMIN — Medication 6 MILLIGRAM(S): at 04:18

## 2025-03-29 RX ADMIN — Medication 0.5 MILLIGRAM(S): at 16:10

## 2025-03-29 RX ADMIN — Medication 4 MILLIGRAM(S): at 03:59

## 2025-03-29 RX ADMIN — Medication 650 MILLIGRAM(S): at 18:33

## 2025-03-29 RX ADMIN — Medication 0.5 MILLIGRAM(S): at 07:52

## 2025-03-29 RX ADMIN — Medication 650 MILLIGRAM(S): at 17:15

## 2025-03-29 RX ADMIN — Medication 4 MILLIGRAM(S): at 01:05

## 2025-03-29 RX ADMIN — Medication 0.5 MILLIGRAM(S): at 08:46

## 2025-03-29 RX ADMIN — Medication 0.5 MILLIGRAM(S): at 14:43

## 2025-03-29 NOTE — H&P ADULT - ASSESSMENT
49 yo male Pmhx Pancreatitis, gallstones - previously had ercp with biliary stents and sphincterotomy by Dr. Post; stents eventually removed 12/24; pt now presents to Ed c/o RUQ/epigastric abdominal pain.    #Abdominal pain 2/2 pancreatic pseudocyst vs pancreatitis  - Progressively worsening epigastric/RUQ abd pain x 1 week. Also felt some radiation to back.  - History of ERCP with pancreatic stent placement and removal.  - Triage VS: /101, HR 83, T 98.2F, Spo2 98% on RA.  - Labs: WBC 16.38k, CMP unremarkable except K 5.2.  - RUQ US 3/29/25: No cholelithiasis or sonographic evidence of acute cholecystitis. Mildly dilated CBD up to 0.8 cm difficult to compare to prior imaging studies as previously there were stents. Echogenic hepatic parenchyma may be secondary to fatty infiltration or hepatocellular disease.  - CTAP w/ IV contrast 3/29/25: 1. No definite evidence of acute abdominopelvic pathology. 2. Redemonstrated findings of chronic pancreatitis. 3. New pancreatic tail cystic lesion measuring up to 3.8 x 2.3 cm could represent a pancreatic pseudocyst in the appropriate clinical setting if there was any recent evidence of pancreatitis. 4. Slightly more prominent cystic lesions in the pancreatic head/uncinate process.  - ED interventions: 2L LR bolus, IV morphine, ondansetron, famotidine.  Plan:  - GI consult.  - Clear liquid diet.  - LR at 150cc/hr.  - PRN Dilaudid for pain along with standing oxy 20 IR BID (he takes this at home).  - Per patient, GI to take him for ERCP on Monday (pending GI note in EMR).  - NF for patient's own pill bottle for Zenpep (Reports taking 2-3 caps of 60K per meal).    #HTN  - Hold losartan until repeat potassium level (came with K 5.2 non hemolyzed). If < 5, resume losartan. If > 5, treat as needed and start amlodipine 5mg for BP control.    #DM  - Hold metformin.  - Start ISS.    #DIET: CLD w/ Ensure clear, CC  #DVTppx: Enoxaparin 40mg x 2 doses ordered, please order further based on procedure requirement.  #GIppx: NI  #Activity: Increase as tolerated (ambulating independently in and out of 3B unit)  #CODE: FULL  #Disposition: from home, admit to medicine.

## 2025-03-29 NOTE — CONSULT NOTE ADULT - ASSESSMENT
Assessment and Plan  Case of a 50 year old male patient with a hx of chronic pancreatitis s/p multiple ERCPs and EUS guided celiac plexus block in 10/2024 who presented to the ED on 03/28 for evaluation of worsening RUQ/epigastric pain with nausea and vomiting, found to have increase in pancreatic head and uncinate cystic lesions and new pancreatic tail 3.8x2.3 cm lesion. We are consulted for above findings.      Chronic Pancreatitis with Increase in Pancreatic Head and Uncinate cystic lesions and increase in main PD prominence  New 3.8x2.3 cm Pancreatic tail cystic lesion: likely pseudocyst but r/o other etiologies such as malignancy  R/O PUD  Hepatic Steatosis  No Cholelithiasis  * Hemodynamically stable  * Labs: WBC 16.38, Hb 12.8, Plt 289, Na 139, K 5.2, BUN 12, Cr 0.9 on 03/28  * Liver enzymes: 0.3/83/15/13 on 03/28; lipase 10  * RUQ US 03/29: CBD 0.8cm, echogenic liver, no cholelithiasis  * CT AP IC 03/29 hepatic steatosis; < from: CT Abdomen and Pelvis w/ IV Cont (03.29.25 @ 01:23) >Redemonstrated findings of chronic pancreatitis. New pancreatic tail cystic lesion measuring up to 3.8 x 2.3 cm could represent a pancreatic pseudocyst in the appropriate clinical setting if there was any recent evidence of pancreatitis.Slightly more prominent cystic lesions in the pancreatic head/uncinate process.  * s/p multiples ERCPs. Had ERCP in 04/2023 then removal of PD stent on 12/19/2024  * s/p EUS guided celiac plexus block on 10/11/2024    RECOMMENDATIONS  - Will schedule patient for EGD EUS on 03/31 Monday  - Can advance to full liquid diet today then to soft diet tonight. Currently on IV fluid hydration with LR at 150mL/hour  - Trend liver enzymes  - Monitor for pain: management per team  - Monitor for nausea: continue antiemetics PRN if QTc is within normal  - Monitor BM and avoid constipation  - Recommend Protonix 40mg QD for GI Prophylaxis; avoid NSAIDs  - Recommend serial abdominal exams. Encourage ambulation and incentive spirometry  - Monitoring off Abs; monitor MAP and keep > 65mmHg; check blood cultures and get ID evaluation in case of fever  - Follow up with our [facility] located at [address] [place], [place] [id number]. Phone Number: [phone]    - Follow up with our [facility] for fibroscan in setting of hepatic steatosis (located at [address] [place], [address]. Telephone No: [phone])      Thank you for your consult.  - Please note that plan was communicated with medical team.   - Please reach GI on [phone] during weekdays till 5pm.  - Please call the GI service line after 5pm on Weekdays and anytime on Weekends: [phone].      Oh Galvan MD  PGY - 5 Gastroenterology Fellow   [facility]

## 2025-03-29 NOTE — H&P ADULT - ATTENDING COMMENTS
My note supersedes all residents notes that I sign, My correction for their notes are in my notes   Pt wife at bedside , he feels better, wants to wallk in the hallway with his wife   On exam  General: awake, alert, NAD, chronic ill appearance  Lungs:  clear to ausculations b/l, normal resp effort  Heart: regular ryhthm   Abdomen: soft, mild epigasrtis and R hypogastric area tender non distended, + BS, no guarding , no rebound   Ext: no edema, can move all  his extremities       51 yo male Pmhx Pancreatitis, gallstones - previously had ercp with biliary stents and sphincterotomy by Dr. Post; stents eventually removed 12/24; pt now presents to Ed c/o RUQ/epigastric abdominal pain.    [] Abdominal pain secondary to  pancreatitis with new pancreatic cyctic lesion / possible pseudocyst   r/o biliary colic   []HX OF prior ERCP  with biliary stents and sphincterotomy by Dr. Post; stents eventually removed   - Progressively worsening epigastric/RUQ abd pain x 1 week. Also felt some radiation to back.  - History of ERCP with pancreatic stent placement and removal.  - Triage VS: /101, HR 83, T 98.2F, Spo2 98% on RA.  - Labs: WBC 16.38k, CMP unremarkable except K 5.2.  - RUQ US 3/29/25: No cholelithiasis or sonographic evidence of acute cholecystitis. Mildly dilated CBD up to 0.8 cm difficult to compare to prior imaging studies as previously there were stents. Echogenic hepatic parenchyma may be secondary to fatty infiltration or hepatocellular disease.  - CTAP w/ IV contrast 3/29/25: 1. No definite evidence of acute abdominopelvic pathology. 2. Redemonstrated findings of chronic pancreatitis. 3. New pancreatic tail cystic lesion measuring up to 3.8 x 2.3 cm could represent a pancreatic pseudocyst in the appropriate clinical setting if there was any recent evidence of pancreatitis. 4. Slightly more prominent cystic lesions in the pancreatic head/uncinate process.  - ED interventions: 2L LR bolus, IV morphine, ondansetron, famotidine.  Plan:  - GI consult.  - Clear liquid diet.  - LR at 150cc/hr.  - PRN Dilaudid for pain along with standing oxy 20 IR BID (he takes this at home).  - Per patient, GI to take him for ERCP on Monday (pending GI note in EMR).  - NF for patient's own pill bottle for Zenpep (Reports taking 2-3 caps of 60K per meal).  No Sepsis RABIA , Leukocytosis noted- monitor for now of abcx   start zosyn if any fever      #HTN  - Hold losartan until repeat potassium level (came with K 5.2 non hemolyzed). If < 5, resume losartan. If > 5, treat as needed and start amlodipine 5mg for BP control.  Get EKG     #DM  - Hold metformin.  - Start ISS.    #DIET: CLD w/ Ensure clear, CC  #DVTppx: Enoxaparin 40mg x 2 doses ordered, please order further based on procedure requirement.  #GIppx: PPI   #Activity: Increase as tolerated (ambulating independently in and out of 3B unit)  #CODE: FULL  #Disposition: from home, admit to medicine.

## 2025-03-29 NOTE — H&P ADULT - HISTORY OF PRESENT ILLNESS
50 yold male to ED Pmhx Pancreatitis, gallstones - previously had ercp with biliary stents and sphincterotomy by Dr. Post; stents eventually removed 12/24; pt now presents to Ed c/o RUQ/epigastric abdominal pain sharp with n/v, chills x 1 week; pt still has GB; pt denies alcohol use; denies fever, brbpr/melena.    Triage VS: /101, HR 83, T 98.2F, Spo2 98% on RA.  Labs: WBC 16.38k, CMP unremarkable.    RUQ US 3/29/25: No cholelithiasis or sonographic evidence of acute cholecystitis. Mildly dilated CBD up to 0.8 cm difficult to compare to prior imaging studies as previously there were stents. Echogenic hepatic parenchyma may be secondary to fatty infiltration or hepatocellular disease.  CTAP w/ IV contrast 3/29/25: 1. No definite evidence of acute abdominopelvic pathology. 2. Redemonstrated findings of chronic pancreatitis. 3. New pancreatic tail cystic lesion measuring up to 3.8 x 2.3 cm could represent a pancreatic pseudocyst in the appropriate clinical setting if there was any recent evidence of pancreatitis. 4. Slightly more prominent cystic lesions in the pancreatic head/uncinate process.    ED interventions: 2L LR bolus, IV morphine, ondansetron, famotidine. 49 yo male Pmhx Pancreatitis, gallstones - previously had ercp with biliary stents and sphincterotomy by Dr. Post; stents eventually removed 12/24; pt now presents to Ed c/o RUQ/epigastric abdominal pain sharp with n/v, chills x 1 week. Pt denies alcohol use; denies fever, brbpr/melena. Denies dysuria, urinary retention.    Triage VS: /101, HR 83, T 98.2F, Spo2 98% on RA.  Labs: WBC 16.38k, CMP unremarkable except K 5.2.    RUQ US 3/29/25: No cholelithiasis or sonographic evidence of acute cholecystitis. Mildly dilated CBD up to 0.8 cm difficult to compare to prior imaging studies as previously there were stents. Echogenic hepatic parenchyma may be secondary to fatty infiltration or hepatocellular disease.    CTAP w/ IV contrast 3/29/25: 1. No definite evidence of acute abdominopelvic pathology. 2. Redemonstrated findings of chronic pancreatitis. 3. New pancreatic tail cystic lesion measuring up to 3.8 x 2.3 cm could represent a pancreatic pseudocyst in the appropriate clinical setting if there was any recent evidence of pancreatitis. 4. Slightly more prominent cystic lesions in the pancreatic head/uncinate process.    ED interventions: 2L LR bolus, IV morphine, ondansetron, famotidine.

## 2025-03-29 NOTE — CONSULT NOTE ADULT - SUBJECTIVE AND OBJECTIVE BOX
Gastroenterology Initial Consult Note    Location: [facility] 011 B [facility]  Patient Name: AMY CARRILLO  Age: 50y  Gender: Male        Chief Complaint  Patient is a 50y old Male who presents with a chief complaint of   Primary diagnosis of Abdominal pain      Reason for Consult  Pancreatic cyst      History of Present Illness  50 year old male patient with a hx of chronic pancreatitis s/p multiple ERCPs and EUS guided celiac plexus block in 10/2024 who presented to the ED on 03/28 for evaluation of worsening RUQ/epigastric pain with nausea and vomiting, found to have increase in pancreatic head and uncinate cystic lesions and new pancreatic tail 3.8x2.3 cm lesion. We are consulted for above findings.  Summary:  * Hemodynamically stable  * Labs: WBC 16.38, Hb 12.8, Plt 289, Na 139, K 5.2, BUN 12, Cr 0.9 on 03/28  * Liver enzymes: 0.3/83/15/13 on 03/28; lipase 10  * RUQ US 03/29: CBD 0.8cm, echogenic liver, no cholelithiasis  * CT AP IC 03/29 hepatic steatosis; < from: CT Abdomen and Pelvis w/ IV Cont (03.29.25 @ 01:23) >Redemonstrated findings of chronic pancreatitis. New pancreatic tail cystic lesion measuring up to 3.8 x 2.3 cm could represent a pancreatic pseudocyst in the appropriate clinical setting if there was any recent evidence of pancreatitis.Slightly more prominent cystic lesions in the pancreatic head/uncinate process.  * s/p multiples ERCPs. Had ERCP in 04/2023 then removal of PD stent on 12/19/2024  * s/p EUS guided celiac plexus block on 10/11/2024        Prior EGD:  as below      Prior Colonoscopy:  none in chart      Past Medicl and Surgical History:  Pancreatitis    Back pain  buldging and herniated discs    Lumbar herniated disc    Bulging lumbar disc    HTN (hypertension)    H/O type 2 diabetes mellitus    S/P appendectomy    History of ERCP  with pancreatic stent placement        Home Medications:  Home Medications:  ibuprofen 600 mg oral tablet: 1 tab(s) orally once a day as needed for  moderate pain (29 Mar 2025 13:11)  Percocet 10/325 oral tablet: 2 tab(s) orally 2 times a day (29 Mar 2025 13:12)  Zenpep 60,000 units-189,600 units-252,600 units oral delayed release capsule: 3 cap(s) orally 3 times a day (with meals) (29 Mar 2025 13:12)        Allergies:  No Known Allergies      Family History:  no GI cancers        Vital Signs in the last 24 hours   Vitals Summary T(C): 36.9 (03-29-25 @ 14:05), Max: 36.9 (03-29-25 @ 14:05)  HR: 71 (03-29-25 @ 14:05) (64 - 83)  BP: 146/93 (03-29-25 @ 14:05) (146/93 - 165/101)  RR: 18 (03-29-25 @ 14:05) (18 - 18)  SpO2: 96% (03-29-25 @ 14:05) (96% - 98%)  Vent Data   Intake/ Output   Measurements   Weight (kg): 69.4 (03-28-25 @ 21:32)      Physical Exam  * General Appearance: Alert, cooperative, interactive, oriented to time, place, and person, in no acute distress  * Eyes: PERRL, conjunctiva/corneas clear, EOM's intact, fundi benign, both eyes      Investigations   Laboratory Workup      - CBC:                        12.8   16.38 )-----------( 289      ( 28 Mar 2025 22:07 )             38.5       - Hgb Trend:  12.8  03-28-25 @ 22:07        - Chemistry:  03-29    137  |  98  |  7[L]  ----------------------------<  215[H]  4.2   |  26  |  0.8    Ca    9.6      29 Mar 2025 14:04    TPro  7.0  /  Alb  4.1  /  TBili  0.3  /  DBili  x   /  AST  15  /  ALT  13  /  AlkPhos  83  03-28    Liver panel trend:  TBili 0.3   /   AST 15   /   ALT 13   /   AlkP 83   /   Tptn 7.0   /   Alb 4.1    /   DBili --      03-28      - Coagulation Studies:      - ABG:      - Cardiac Markers:        Microbiological Workup  Urinalysis Basic - ( 29 Mar 2025 14:04 )    Color: x / Appearance: x / SG: x / pH: x  Gluc: 215 mg/dL / Ketone: x  / Bili: x / Urobili: x   Blood: x / Protein: x / Nitrite: x   Leuk Esterase: x / RBC: x / WBC x   Sq Epi: x / Non Sq Epi: x / Bacteria: x          Radiological Workup  CT Abdomen and Pelvis w/ IV Cont:   ACC: [id number] EXAM:  CT ABDOMEN AND PELVIS IC   ORDERED BY: MANUELITO NEWMAN     PROCEDURE DATE:  03/29/2025          INTERPRETATION:  CLINICAL INFORMATION: Abdominal pain.    COMPARISON: 4/9/2024    CONTRAST/COMPLICATIONS:  IV Contrast: Omnipaque [id number] cc administered   0 cc discarded  Oral Contrast: NONE  .    PROCEDURE:  CT of the Abdomen and Pelvis was performed.  Sagittal and coronal reformats were performed.    FINDINGS:  LOWER CHEST: Within normal limits.    LIVER: Mild diffuse steatosis.  BILE DUCTS: Normal caliber.  GALLBLADDER: Within normal limits.  SPLEEN: Within normal limits.  PANCREAS: Redemonstrated diffuse pancreatic parenchymal calcifications   with mild ductal prominence likely representing sequelae of chronic   pancreatitis. Increased appearance of pancreatic head/uncinate process   cystic lesions. No pancreatic tail cystic lesion measuring up to 3.8 x   2.3 cm which could represent a pseudocyst in the appropriate clinical   setting appears new since abdominal MRI 8/22/2024.  ADRENALS: Within normal limits.  KIDNEYS/URETERS: Symmetric enhancement. No hydronephrosis. Bilateral   renal cysts    BLADDER: Within normal limits.  REPRODUCTIVE ORGANS: Not well evaluated.    BOWEL: No bowel obstruction. Gas distended rectum, nonspecific.  PERITONEUM/RETROPERITONEUM: Within normal limits.  VESSELS: Atherosclerosis.  LYMPH NODES: No lymphadenopathy.  ABDOMINAL WALL: Within normal limits.  BONES: Degenerative changes.    IMPRESSION:  1.  No definite evidence of acute abdominopelvic pathology.  2.  Redemonstrated findings of chronic pancreatitis.  3.  New pancreatic tail cystic lesion measuring up to 3.8 x 2.3 cm could   represent a pancreatic pseudocyst in the appropriate clinical setting if   there was any recent evidence of pancreatitis.  4.  Slightly more prominent cystic lesions in the pancreatic   head/uncinate process.        --- End of Report ---            WILLOW PRESSLEY MD; Attending Radiologist  This document has been electronically signed. Mar 29 2025  1:40AM (03-29-25 @ 01:23)    US Abdomen Upper Quadrant Right:   ACC: [id number] EXAM:  US ABDOMEN RT UPR QUADRANT   ORDERED BY: MANUELITO NEWMAN     PROCEDURE DATE:  03/29/2025          INTERPRETATION:  CLINICAL INFORMATION: Abdominal pain.    COMPARISON: None available.    TECHNIQUE: Sonography of the right upper quadrant.    FINDINGS:    Liver: Increased echogenicity.  Bile ducts: Normal caliber. Common bile duct measures 0.8 cm.  Gallbladder: No cholelithiasis. Normal gallbladder wall thickness. No   pericholecystic fluid. Negative reported sonographic Morgan sign.  Pancreas: Visualized portions are within normal limits.  Right kidney: 10.7 cm. No hydronephrosis. Lower pole cyst.  Ascites: None.  IVC: Visualized portions are within normal limits.    IMPRESSION:    No cholelithiasis or sonographic evidence of acute cholecystitis.    Mildly dilated CBD up to 0.8 cm difficult to compare to prior imaging   studies as previously there were stents.    Echogenic hepatic parenchyma may be secondary to fatty infiltration or   hepatocellular disease.    --- End of Report ---          JILL AMAYA MD; Resident Radiologist  This document has been electronically signed.  WILLOW PRESSLEY MD; Attending Radiologist  This document has been electronically signed. Mar 29 2025  3:16AM (03-29-25 @ 00:19)          Current Medications  Standing Medications  acetaminophen     Tablet .. 650 milliGRAM(s) Oral <User Schedule>  dextrose 5%. 1000 milliLiter(s) (50 mL/Hr) IV Continuous <Continuous>  dextrose 5%. 1000 milliLiter(s) (100 mL/Hr) IV Continuous <Continuous>  dextrose 50% Injectable 25 Gram(s) IV Push once  dextrose 50% Injectable 12.5 Gram(s) IV Push once  dextrose 50% Injectable 25 Gram(s) IV Push once  enoxaparin Injectable 40 milliGRAM(s) SubCutaneous every 24 hours  glucagon  Injectable 1 milliGRAM(s) IntraMuscular once  influenza   Vaccine 0.5 milliLiter(s) IntraMuscular once  insulin lispro (ADMELOG) corrective regimen sliding scale   SubCutaneous three times a day before meals  lactated ringers. 1000 milliLiter(s) (150 mL/Hr) IV Continuous <Continuous>  oxyCODONE    IR 20 milliGRAM(s) Oral <User Schedule>  pantoprazole    Tablet 40 milliGRAM(s) Oral before breakfast    PRN Medications  aluminum hydroxide/magnesium hydroxide/simethicone Suspension 30 milliLiter(s) Oral every 4 hours PRN Dyspepsia  dextrose Oral Gel 15 Gram(s) Oral once PRN Blood Glucose LESS THAN 70 milliGRAM(s)/deciliter  HYDROmorphone  Injectable 0.5 milliGRAM(s) IV Push every 4 hours PRN Severe Pain (7 - 10)  melatonin 3 milliGRAM(s) Oral at bedtime PRN Insomnia  ondansetron Injectable 4 milliGRAM(s) IV Push every 8 hours PRN Nausea and/or Vomiting    Singles Doses Administered  famotidine Injectable 20 milliGRAM(s) IV Push once  lactated ringers Bolus 2000 milliLiter(s) IV Bolus once  morphine  - Injectable 4 milliGRAM(s) IV Push once  morphine  - Injectable 4 milliGRAM(s) IV Push Once  morphine  - Injectable 6 milliGRAM(s) IV Push Once  ondansetron Injectable 4 milliGRAM(s) IV Push once

## 2025-03-30 LAB
ALBUMIN SERPL ELPH-MCNC: 4.3 G/DL — SIGNIFICANT CHANGE UP (ref 3.5–5.2)
ALP SERPL-CCNC: 90 U/L — SIGNIFICANT CHANGE UP (ref 30–115)
ALT FLD-CCNC: 14 U/L — SIGNIFICANT CHANGE UP (ref 0–41)
ANION GAP SERPL CALC-SCNC: 13 MMOL/L — SIGNIFICANT CHANGE UP (ref 7–14)
AST SERPL-CCNC: 15 U/L — SIGNIFICANT CHANGE UP (ref 0–41)
BASOPHILS # BLD AUTO: 0.08 K/UL — SIGNIFICANT CHANGE UP (ref 0–0.2)
BASOPHILS NFR BLD AUTO: 0.6 % — SIGNIFICANT CHANGE UP (ref 0–1)
BILIRUB SERPL-MCNC: 0.4 MG/DL — SIGNIFICANT CHANGE UP (ref 0.2–1.2)
BUN SERPL-MCNC: 8 MG/DL — LOW (ref 10–20)
CALCIUM SERPL-MCNC: 10.2 MG/DL — SIGNIFICANT CHANGE UP (ref 8.4–10.5)
CHLORIDE SERPL-SCNC: 94 MMOL/L — LOW (ref 98–110)
CHOLEST SERPL-MCNC: 169 MG/DL — SIGNIFICANT CHANGE UP
CO2 SERPL-SCNC: 29 MMOL/L — SIGNIFICANT CHANGE UP (ref 17–32)
CREAT SERPL-MCNC: 1 MG/DL — SIGNIFICANT CHANGE UP (ref 0.7–1.5)
EGFR: 91 ML/MIN/1.73M2 — SIGNIFICANT CHANGE UP
EGFR: 91 ML/MIN/1.73M2 — SIGNIFICANT CHANGE UP
EOSINOPHIL # BLD AUTO: 0.38 K/UL — SIGNIFICANT CHANGE UP (ref 0–0.7)
EOSINOPHIL NFR BLD AUTO: 3 % — SIGNIFICANT CHANGE UP (ref 0–8)
GLUCOSE BLDC GLUCOMTR-MCNC: 165 MG/DL — HIGH (ref 70–99)
GLUCOSE SERPL-MCNC: 192 MG/DL — HIGH (ref 70–99)
HCT VFR BLD CALC: 39.5 % — LOW (ref 42–52)
HDLC SERPL-MCNC: 45 MG/DL — SIGNIFICANT CHANGE UP
HGB BLD-MCNC: 13.1 G/DL — LOW (ref 14–18)
IMM GRANULOCYTES NFR BLD AUTO: 0.5 % — HIGH (ref 0.1–0.3)
LDLC SERPL-MCNC: 106 MG/DL — HIGH
LIPID PNL WITH DIRECT LDL SERPL: 106 MG/DL — HIGH
LYMPHOCYTES # BLD AUTO: 17.1 % — LOW (ref 20.5–51.1)
LYMPHOCYTES # BLD AUTO: 2.19 K/UL — SIGNIFICANT CHANGE UP (ref 1.2–3.4)
MAGNESIUM SERPL-MCNC: 2.1 MG/DL — SIGNIFICANT CHANGE UP (ref 1.8–2.4)
MCHC RBC-ENTMCNC: 29.4 PG — SIGNIFICANT CHANGE UP (ref 27–31)
MCHC RBC-ENTMCNC: 33.2 G/DL — SIGNIFICANT CHANGE UP (ref 32–37)
MCV RBC AUTO: 88.8 FL — SIGNIFICANT CHANGE UP (ref 80–94)
MONOCYTES # BLD AUTO: 1.08 K/UL — HIGH (ref 0.1–0.6)
MONOCYTES NFR BLD AUTO: 8.4 % — SIGNIFICANT CHANGE UP (ref 1.7–9.3)
NEUTROPHILS # BLD AUTO: 9.05 K/UL — HIGH (ref 1.4–6.5)
NEUTROPHILS NFR BLD AUTO: 70.4 % — SIGNIFICANT CHANGE UP (ref 42.2–75.2)
NONHDLC SERPL-MCNC: 124 MG/DL — SIGNIFICANT CHANGE UP
NRBC BLD AUTO-RTO: 0 /100 WBCS — SIGNIFICANT CHANGE UP (ref 0–0)
PLATELET # BLD AUTO: 307 K/UL — SIGNIFICANT CHANGE UP (ref 130–400)
PMV BLD: 11.4 FL — HIGH (ref 7.4–10.4)
POTASSIUM SERPL-MCNC: 5.4 MMOL/L — HIGH (ref 3.5–5)
POTASSIUM SERPL-SCNC: 5.4 MMOL/L — HIGH (ref 3.5–5)
PROT SERPL-MCNC: 6.9 G/DL — SIGNIFICANT CHANGE UP (ref 6–8)
RBC # BLD: 4.45 M/UL — LOW (ref 4.7–6.1)
RBC # FLD: 14.2 % — SIGNIFICANT CHANGE UP (ref 11.5–14.5)
SODIUM SERPL-SCNC: 136 MMOL/L — SIGNIFICANT CHANGE UP (ref 135–146)
TRIGL SERPL-MCNC: 98 MG/DL — SIGNIFICANT CHANGE UP
WBC # BLD: 12.84 K/UL — HIGH (ref 4.8–10.8)
WBC # FLD AUTO: 12.84 K/UL — HIGH (ref 4.8–10.8)

## 2025-03-30 PROCEDURE — 99233 SBSQ HOSP IP/OBS HIGH 50: CPT

## 2025-03-30 PROCEDURE — 99232 SBSQ HOSP IP/OBS MODERATE 35: CPT

## 2025-03-30 RX ADMIN — LOSARTAN POTASSIUM 25 MILLIGRAM(S): 100 TABLET, FILM COATED ORAL at 05:07

## 2025-03-30 RX ADMIN — Medication 40 MILLIGRAM(S): at 05:08

## 2025-03-31 ENCOUNTER — RESULT REVIEW (OUTPATIENT)
Age: 50
End: 2025-03-31

## 2025-03-31 ENCOUNTER — TRANSCRIPTION ENCOUNTER (OUTPATIENT)
Age: 50
End: 2025-03-31

## 2025-03-31 VITALS
HEART RATE: 77 BPM | TEMPERATURE: 98 F | DIASTOLIC BLOOD PRESSURE: 91 MMHG | SYSTOLIC BLOOD PRESSURE: 145 MMHG | RESPIRATION RATE: 18 BRPM | OXYGEN SATURATION: 97 %

## 2025-03-31 LAB
A1C WITH ESTIMATED AVERAGE GLUCOSE RESULT: 6.9 % — HIGH (ref 4–5.6)
ESTIMATED AVERAGE GLUCOSE: 151 MG/DL — HIGH (ref 68–114)

## 2025-03-31 PROCEDURE — 43242 EGD US FINE NEEDLE BX/ASPIR: CPT

## 2025-03-31 PROCEDURE — 88305 TISSUE EXAM BY PATHOLOGIST: CPT | Mod: 26

## 2025-03-31 PROCEDURE — 88312 SPECIAL STAINS GROUP 1: CPT | Mod: 26

## 2025-03-31 PROCEDURE — 99239 HOSP IP/OBS DSCHRG MGMT >30: CPT

## 2025-03-31 PROCEDURE — 88173 CYTOPATH EVAL FNA REPORT: CPT | Mod: 26

## 2025-03-31 PROCEDURE — 43239 EGD BIOPSY SINGLE/MULTIPLE: CPT | Mod: XU

## 2025-03-31 RX ORDER — OXYCODONE HYDROCHLORIDE AND ACETAMINOPHEN 10; 325 MG/1; MG/1
4 TABLET ORAL
Qty: 0 | Refills: 0 | DISCHARGE
Start: 2025-03-31

## 2025-03-31 RX ORDER — ENOXAPARIN SODIUM 100 MG/ML
40 INJECTION SUBCUTANEOUS EVERY 24 HOURS
Refills: 0 | Status: DISCONTINUED | OUTPATIENT
Start: 2025-04-01 | End: 2025-03-31

## 2025-03-31 RX ORDER — OXYCODONE HYDROCHLORIDE 30 MG/1
1 TABLET ORAL
Qty: 0 | Refills: 0 | DISCHARGE
Start: 2025-03-31

## 2025-03-31 RX ADMIN — LOSARTAN POTASSIUM 25 MILLIGRAM(S): 100 TABLET, FILM COATED ORAL at 05:07

## 2025-03-31 NOTE — DISCHARGE NOTE PROVIDER - NSDCFUSCHEDAPPT_GEN_ALL_CORE_FT
Hipolito Post  [facility]  GASTRO Doc Off [id number]  Scheduled Appointment: 04/30/2025    Tali Braun  [facility]  ENDOCRIN [id number]  Scheduled Appointment: 05/12/2025

## 2025-03-31 NOTE — DISCHARGE NOTE PROVIDER - HOSPITAL COURSE
51 yo male Pmhx Pancreatitis, gallstones - previously had ercp with biliary stents and sphincterotomy by Dr. Post; stents eventually removed 12/24; pt now presents to Ed c/o RUQ/epigastric abdominal pain sharp with n/v, chills x 1 week. Pt denies alcohol use; denies fever, brbpr/melena. Denies dysuria, urinary retention.    Triage VS: /101, HR 83, T 98.2F, Spo2 98% on RA.  Labs: WBC 16.38k, CMP unremarkable except K 5.2.    RUQ US 3/29/25: No cholelithiasis or sonographic evidence of acute cholecystitis. Mildly dilated CBD up to 0.8 cm difficult to compare to prior imaging studies as previously there were stents. Echogenic hepatic parenchyma may be secondary to fatty infiltration or hepatocellular disease.    CTAP w/ IV contrast 3/29/25: 1. No definite evidence of acute abdominopelvic pathology. 2. Redemonstrated findings of chronic pancreatitis. 3. New pancreatic tail cystic lesion measuring up to 3.8 x 2.3 cm could represent a pancreatic pseudocyst in the appropriate clinical setting if there was any recent evidence of pancreatitis. 4. Slightly more prominent cystic lesions in the pancreatic head/uncinate process.    ED interventions: 2L LR bolus, IV morphine, ondansetron, famotidine.    #Abdominal pain  #Chronic Pancreatitis with Increase in Pancreatic Head and Uncinate cystic lesions and increase in main PD prominence  #New 3.8x2.3 cm Pancreatic tail cystic lesion: likely pseudocyst but r/o other etiologies such as malignancy  - s/p multiples ERCPs. Had ERCP in 04/2023 then removal of PD stent on 12/19/2024  - s/p EUS guided celiac plexus block on 10/11/2024  - Progressively worsening epigastric/RUQ abd pain x 1 week, radiating to back.  - History of ERCP with pancreatic stent placement and removal.  - Triage VS: /101, HR 83, T 98.2F, Spo2 98% on RA.  - Labs: WBC 16.38k, CMP unremarkable except K 5.2.  - RUQ US 3/29/25: No cholelithiasis or sonographic evidence of acute cholecystitis. Mildly dilated CBD up to 0.8 cm difficult to compare to prior imaging studies as previously there were stents. Echogenic hepatic parenchyma may be secondary to fatty infiltration or hepatocellular disease.  - CTAP w/ IV contrast 3/29/25: No definite evidence of acute abdominopelvic pathology. Redemonstrated findings of chronic pancreatitis. New pancreatic tail cystic lesion measuring up to 3.8 x 2.3 cm could represent a pancreatic pseudocyst in the appropriate clinical setting if there was any recent evidence of pancreatitis. Slightly more prominent cystic lesions in the pancreatic head/uncinate process.  - ED interventions: 2L LR bolus, IV morphine, ondansetron, famotidine.  - Seen by GI   - LR at 150cc/hr.  - PRN Dilaudid for pain along with standing oxy 20 IR BID (he takes this at home).  - EGD/EUS done on 3/31/25, to follow up with advanced GI as op

## 2025-03-31 NOTE — PROGRESS NOTE ADULT - SUBJECTIVE AND OBJECTIVE BOX
AMY CARRILLO  [facility] 011 B ([facility])            Patient was evaluated and examined  by bedside,                 REVIEW OF SYSTEMS:  please see pertinent positives mentioned above, all other 12 ROS negative      T(C): , Max: 37.2 (03-29-25 @ 20:37)  HR: 88 (03-30-25 @ 04:17)  BP: 147/103 (03-30-25 @ 04:17)  RR: 18 (03-30-25 @ 04:17)  SpO2: 97% (03-30-25 @ 04:17)  CAPILLARY BLOOD GLUCOSE      POCT Blood Glucose.: 112 mg/dL (30 Mar 2025 11:49)  POCT Blood Glucose.: 165 mg/dL (30 Mar 2025 07:41)  POCT Blood Glucose.: 145 mg/dL (29 Mar 2025 21:22)  POCT Blood Glucose.: 139 mg/dL (29 Mar 2025 16:44)      PHYSICAL EXAM:  General: NAD, comfortable in bed  HEENT: NCAT  Lungs: No increased WOB  CVS: RRR  Abdomen: , non-distended  Extremities: no edema        LAB  CBC  Date: 03-30-25 @ 09:28  Mean cell Rcgysjglbx93.4  Mean cell Hemoglobin Conc33.2  Mean cell Volum 88.8  Platelet count-Automate 307  RBC Count 4.45  Red Cell Distrib Width14.2  WBC Count12.84  % Albumin, Urine--  Hematocrit 39.5  Hemoglobin 13.1  CBC  Date: 03-28-25 @ 22:07  Mean cell Smdiqchbty92.3  Mean cell Hemoglobin Conc33.2  Mean cell Volum 88.1  Platelet count-Automate 289  RBC Count 4.37  Red Cell Distrib Width14.3  WBC Count16.38  % Albumin, Urine--  Hematocrit 38.5  Hemoglobin 12.8    [facility]  03-30-25 @ 09:28  Blood Gas Arterial-Calcium,Ionized--  Blood Urea Nitrogen, Serum 8 mg/dL[L] [10 - 20]  Carbon Dioxide, Serum29 mmol/L [17 - 32]  Chloride, Serum94 mmol/L[L] [98 - 110]  Creatinie, Serum1.0 mg/dL [0.7 - 1.5]  Glucose, Omrhw926 mg/dL[H] [70 - 99]  Potassium, Serum5.4 mmol/L[H] [3.5 - 5.0]  Sodium, Serum 136 mmol/L [135 - 146]  [facility]  03-29-25 @ 14:04  Blood Gas Arterial-Calcium,Ionized--  Blood Urea Nitrogen, Serum 7 mg/dL[L] [10 - 20]  Carbon Dioxide, Serum26 mmol/L [17 - 32]  Chloride, Serum98 mmol/L [98 - 110]  Creatinie, Serum0.8 mg/dL [0.7 - 1.5]  Glucose, Fzaqv793 mg/dL[H] [70 - 99]  Potassium, Serum4.2 mmol/L [3.5 - 5.0]  Sodium, Serum 137 mmol/L [135 - 146]  [facility]  03-28-25 @ 22:07  Blood Gas Arterial-Calcium,Ionized--  Blood Urea Nitrogen, Serum 12 mg/dL [10 - 20]  Carbon Dioxide, Serum27 mmol/L [17 - 32]  Chloride, Jmnkv352 mmol/L [98 - 110]  Creatinie, Serum0.9 mg/dL [0.7 - 1.5]  Glucose, Lctzz106 mg/dL[H] [70 - 99]  Potassium, Serum5.2 mmol/L[H] [3.5 - 5.0]  Sodium, Serum 139 mmol/L [135 - 146]              Microbiology:      RADIOLOGY & ADDITIONAL TESTS:        Medications:  aluminum hydroxide/magnesium hydroxide/simethicone Suspension 30 milliLiter(s) Oral every 4 hours PRN  dextrose 5%. 1000 milliLiter(s) IV Continuous <Continuous>  dextrose 5%. 1000 milliLiter(s) IV Continuous <Continuous>  dextrose 50% Injectable 25 Gram(s) IV Push once  dextrose 50% Injectable 12.5 Gram(s) IV Push once  dextrose 50% Injectable 25 Gram(s) IV Push once  dextrose Oral Gel 15 Gram(s) Oral once PRN  enoxaparin Injectable 40 milliGRAM(s) SubCutaneous every 24 hours  glucagon  Injectable 1 milliGRAM(s) IntraMuscular once  HYDROmorphone  Injectable 0.5 milliGRAM(s) IV Push every 4 hours PRN  influenza   Vaccine 0.5 milliLiter(s) IntraMuscular once  insulin lispro (ADMELOG) corrective regimen sliding scale   SubCutaneous three times a day before meals  lactated ringers. 1000 milliLiter(s) IV Continuous <Continuous>  losartan 25 milliGRAM(s) Oral daily  melatonin 3 milliGRAM(s) Oral at bedtime PRN  ondansetron Injectable 4 milliGRAM(s) IV Push every 8 hours PRN  oxycodone    5 mG/acetaminophen 325 mG 4 Tablet(s) Oral every 12 hours PRN  oxyCODONE    IR 20 milliGRAM(s) Oral <User Schedule>  pantoprazole    Tablet 40 milliGRAM(s) Oral before breakfast  Zenpep (Pancrealipase) 60,000U Capsule 2 Capsule(s) 2 Capsule(s) Oral three times a day with meals        Assessment and Plan:  51 yo male Pmhx Pancreatitis, gallstones - previously had ercp with biliary stents and sphincterotomy by Dr. Post; stents eventually removed 12/24; pt now presents to Ed c/o RUQ/epigastric abdominal pain.    #Abdominal pain 2/2 pancreatic pseudocyst vs chronic pancreatitis  - Progressively worsening epigastric/RUQ abd pain x 1 week. Also felt some radiation to back.  - History of ERCP with pancreatic stent placement and removal.  - Triage VS: /101, HR 83, T 98.2F, Spo2 98% on RA.  - Labs: WBC 16.38k, CMP unremarkable except K 5.2.  - RUQ US 3/29/25: No cholelithiasis or sonographic evidence of acute cholecystitis. Mildly dilated CBD up to 0.8 cm difficult to compare to prior imaging studies as previously there were stents. Echogenic hepatic parenchyma may be secondary to fatty infiltration or hepatocellular disease.  - CTAP w/ IV contrast 3/29/25: 1. No definite evidence of acute abdominopelvic pathology. 2. Redemonstrated findings of chronic pancreatitis. 3. New pancreatic tail cystic lesion measuring up to 3.8 x 2.3 cm could represent a pancreatic pseudocyst in the appropriate clinical setting if there was any recent evidence of pancreatitis. 4. Slightly more prominent cystic lesions in the pancreatic head/uncinate process.  - ED interventions: 2L LR bolus, IV morphine, ondansetron, famotidine.  Plan:  - GI consult.  - Clear liquid diet.  - LR at 150cc/hr.  - PRN Dilaudid for pain along with standing oxy 20 IR BID (he takes this at home).  - GI will take patient for EGD/EUS on Monday  - NF for patient's own pill bottle for Zenpep (Reports taking 2-3 caps of 60K per meal).    #HTN  - Hold losartan until repeat potassium level (came with K 5.2 non hemolyzed). If < 5, resume losartan. If > 5, treat as needed and start amlodipine 5mg for BP control.    #DM  - Hold metformin.  - Start ISS.    #DIET: CLD w/ Ensure clear, CC  #DVTppx: Enoxaparin 40mg x 2 doses ordered, please order further based on procedure requirement.  #GIppx: NI  #Activity: Increase as tolerated (ambulating independently in and out of 3B unit)  #CODE: FULL  #Disposition: from home, admit to medicine.      
24H events:    Patient is a 50y old Male who presents with a chief complaint of   Primary diagnosis of Abdominal pain    Today is hospital day 2d. This morning patient was seen and examined at bedside, resting comfortably in bed.    No acute or major events overnight.    PAST MEDICAL & SURGICAL HISTORY  Pancreatitis    Back pain  buldging and herniated discs    Lumbar herniated disc    Bulging lumbar disc    HTN (hypertension)    H/O type 2 diabetes mellitus    S/P appendectomy    History of ERCP  with pancreatic stent placement      ALLERGIES:  No Known Allergies    MEDICATIONS:  STANDING MEDICATIONS  dextrose 5%. 1000 milliLiter(s) IV Continuous <Continuous>  dextrose 5%. 1000 milliLiter(s) IV Continuous <Continuous>  dextrose 50% Injectable 25 Gram(s) IV Push once  dextrose 50% Injectable 12.5 Gram(s) IV Push once  dextrose 50% Injectable 25 Gram(s) IV Push once  glucagon  Injectable 1 milliGRAM(s) IntraMuscular once  influenza   Vaccine 0.5 milliLiter(s) IntraMuscular once  insulin lispro (ADMELOG) corrective regimen sliding scale   SubCutaneous three times a day before meals  lactated ringers. 1000 milliLiter(s) IV Continuous <Continuous>  losartan 25 milliGRAM(s) Oral daily  oxyCODONE    IR 20 milliGRAM(s) Oral <User Schedule>  pantoprazole    Tablet 40 milliGRAM(s) Oral before breakfast  Zenpep (Pancrealipase) 60,000U Capsule 2 Capsule(s) 2 Capsule(s) Oral three times a day with meals    PRN MEDICATIONS  aluminum hydroxide/magnesium hydroxide/simethicone Suspension 30 milliLiter(s) Oral every 4 hours PRN  dextrose Oral Gel 15 Gram(s) Oral once PRN  HYDROmorphone  Injectable 0.5 milliGRAM(s) IV Push every 4 hours PRN  melatonin 3 milliGRAM(s) Oral at bedtime PRN  ondansetron Injectable 4 milliGRAM(s) IV Push every 8 hours PRN  oxycodone    5 mG/acetaminophen 325 mG 4 Tablet(s) Oral every 12 hours PRN    VITALS:   T(F): 98.1  HR: 74  BP: 153/101  RR: 18  SpO2: 96%    PHYSICAL EXAM:  GENERAL:   ( X ) NAD, lying in bed comfortably     (  ) obtunded     (  ) lethargic     (  ) somnolent    HEAD:   ( X ) Atraumatic     (  ) hematoma     (  ) laceration (specify location:       )     NECK:  ( X ) Supple     (  ) neck stiffness     (  ) nuchal rigidity     (  )  no JVD     (  ) JVD present ( -- cm)    HEART:  Rate -->     ( X ) normal rate     (  ) bradycardic     (  ) tachycardic  Rhythm -->     ( X ) regular     (  ) regularly irregular     (  ) irregularly irregular  Murmurs -->     ( X ) normal s1s2     (  ) systolic murmur     (  ) diastolic murmur     (  ) continuous murmur      (  ) S3 present     (  ) S4 present    LUNGS:   ( X )Unlabored respirations     (  ) tachypnea  ( X ) B/L air entry     (  ) decreased breath sounds in:  (location     )    ( X ) no adventitious sound     (  ) crackles     (  ) wheezing      (  ) rhonchi      (specify location:       )  (  ) chest wall tenderness (specify location:       )    ABDOMEN:   ( X ) Soft     (  ) tense   |   (  ) nondistended     (  ) distended   |   (  ) +BS     (  ) hypoactive bowel sounds     (  ) hyperactive bowel sounds  ( X ) nontender     (  ) RUQ tenderness     (  ) RLQ tenderness     (  ) LLQ tenderness     (  ) epigastric tenderness     (  ) diffuse tenderness  (  ) Splenomegaly      (  ) Hepatomegaly      (  ) Jaundice     (  ) ecchymosis     EXTREMITIES:  ( X ) Normal     (  ) Rash     (  ) ecchymosis     (  ) varicose veins      (  ) pitting edema     (  ) non-pitting edema   (  ) ulceration     (  ) gangrene:     (location:     )    NERVOUS SYSTEM:    ( X ) A&Ox3     (  ) confused     (  ) lethargic    LABS:                        13.1   12.84 )-----------( 307      ( 30 Mar 2025 09:28 )             39.5     03-30    136  |  94[L]  |  8[L]  ----------------------------<  192[H]  5.4[H]   |  29  |  1.0    Ca    10.2      30 Mar 2025 09:28  Mg     2.1     03-30    TPro  6.9  /  Alb  4.3  /  TBili  0.4  /  DBili  x   /  AST  15  /  ALT  14  /  AlkPhos  90  03-30      Urinalysis Basic - ( 30 Mar 2025 09:28 )    Color: x / Appearance: x / SG: x / pH: x  Gluc: 192 mg/dL / Ketone: x  / Bili: x / Urobili: x   Blood: x / Protein: x / Nitrite: x   Leuk Esterase: x / RBC: x / WBC x   Sq Epi: x / Non Sq Epi: x / Bacteria: x        
Gastroenterology Progress Note    Location: [address] ([facility] 3B)  Patient Name: AMY CARRILLO  Age: 50y  Gender: Male        Chief Complaint  Patient is a 50y old Male who presents with a chief complaint of   Primary diagnosis of Abdominal pain      Reason for Consult  Pancreatic cyst      Progress Note  Patient was examined at bedside.  He notes improved right sided pain on dilaudid, percocet, and OC.  He denies nausea or vomiting.  His last BM was on Thursday.      Vital Signs in the last 24 hours   T(C): 36.8 (30 Mar 2025 04:17), Max: 37.2 (29 Mar 2025 20:37)  T(F): 98.2 (30 Mar 2025 04:17), Max: 99 (29 Mar 2025 20:37)  HR: 88 (30 Mar 2025 04:17) (71 - 88)  BP: 147/103 (30 Mar 2025 04:17) (132/77 - 147/103)  BP(mean): 111 (29 Mar 2025 14:05) (111 - 111)  ABP: --  ABP(mean): --  RR: 18 (30 Mar 2025 04:17) (18 - 18)  SpO2: 97% (30 Mar 2025 04:17) (96% - 98%)    O2 Parameters below as of 29 Mar 2025 14:05  Patient On (Oxygen Delivery Method): room air        Physical Exam  CONSTITUTIONAL: Well groomed, no apparent distress  EYES: PERRLA and symmetric, EOMI, No conjunctival or scleral injection, non-icteric  RESP: No respiratory distress, no use of accessory muscles; CTA b/l, no WRR  GI: Soft, NT, ND, no rebound, no guarding; no palpable masses; no hepatosplenomegaly; no hernia palpated  LYMPH: No cervical LAD or tenderness; no axillary LAD or tenderness; no inguinal LAD or tenderness  SKIN: No rashes or ulcers noted; no subcutaneous nodules or induration palpable      Investigations                          13.1   12.84 )-----------( 307      ( 30 Mar 2025 09:28 )             39.5     03-30    136  |  94[L]  |  8[L]  ----------------------------<  192[H]  5.4[H]   |  29  |  1.0    Ca    10.2      30 Mar 2025 09:28  Mg     2.1     03-30    TPro  6.9  /  Alb  4.3  /  TBili  0.4  /  DBili  x   /  AST  15  /  ALT  14  /  AlkPhos  90  03-30        LIVER FUNCTIONS - ( 30 Mar 2025 09:28 )  Alb: 4.3 g/dL / Pro: 6.9 g/dL / ALK PHOS: 90 U/L / ALT: 14 U/L / AST: 15 U/L / GGT: x               Urinalysis Basic - ( 30 Mar 2025 09:28 )    Color: x / Appearance: x / SG: x / pH: x  Gluc: 192 mg/dL / Ketone: x  / Bili: x / Urobili: x   Blood: x / Protein: x / Nitrite: x   Leuk Esterase: x / RBC: x / WBC x   Sq Epi: x / Non Sq Epi: x / Bacteria: x        Current Medications  Standing Medications  acetaminophen     Tablet .. 650 milliGRAM(s) Oral <User Schedule>  dextrose 5%. 1000 milliLiter(s) (50 mL/Hr) IV Continuous <Continuous>  dextrose 5%. 1000 milliLiter(s) (100 mL/Hr) IV Continuous <Continuous>  dextrose 50% Injectable 25 Gram(s) IV Push once  dextrose 50% Injectable 12.5 Gram(s) IV Push once  dextrose 50% Injectable 25 Gram(s) IV Push once  enoxaparin Injectable 40 milliGRAM(s) SubCutaneous every 24 hours  glucagon  Injectable 1 milliGRAM(s) IntraMuscular once  influenza   Vaccine 0.5 milliLiter(s) IntraMuscular once  insulin lispro (ADMELOG) corrective regimen sliding scale   SubCutaneous three times a day before meals  lactated ringers. 1000 milliLiter(s) (150 mL/Hr) IV Continuous <Continuous>  oxyCODONE    IR 20 milliGRAM(s) Oral <User Schedule>  pantoprazole    Tablet 40 milliGRAM(s) Oral before breakfast    PRN Medications  aluminum hydroxide/magnesium hydroxide/simethicone Suspension 30 milliLiter(s) Oral every 4 hours PRN Dyspepsia  dextrose Oral Gel 15 Gram(s) Oral once PRN Blood Glucose LESS THAN 70 milliGRAM(s)/deciliter  HYDROmorphone  Injectable 0.5 milliGRAM(s) IV Push every 4 hours PRN Severe Pain (7 - 10)  melatonin 3 milliGRAM(s) Oral at bedtime PRN Insomnia  ondansetron Injectable 4 milliGRAM(s) IV Push every 8 hours PRN Nausea and/or Vomiting    Singles Doses Administered  famotidine Injectable 20 milliGRAM(s) IV Push once  lactated ringers Bolus 2000 milliLiter(s) IV Bolus once  morphine  - Injectable 4 milliGRAM(s) IV Push once  morphine  - Injectable 4 milliGRAM(s) IV Push Once  morphine  - Injectable 6 milliGRAM(s) IV Push Once  ondansetron Injectable 4 milliGRAM(s) IV Push once

## 2025-03-31 NOTE — PROGRESS NOTE ADULT - ASSESSMENT
51 yo male Pmhx Pancreatitis, gallstones - previously had ercp with biliary stents and sphincterotomy by Dr. Post; stents eventually removed 12/24; pt now presents to Ed c/o RUQ/epigastric abdominal pain.    #Abdominal pain  #Chronic Pancreatitis with Increase in Pancreatic Head and Uncinate cystic lesions and increase in main PD prominence  #New 3.8x2.3 cm Pancreatic tail cystic lesion: likely pseudocyst but r/o other etiologies such as malignancy  - s/p multiples ERCPs. Had ERCP in 04/2023 then removal of PD stent on 12/19/2024  - s/p EUS guided celiac plexus block on 10/11/2024  - Progressively worsening epigastric/RUQ abd pain x 1 week, radiating to back.  - History of ERCP with pancreatic stent placement and removal.  - Triage VS: /101, HR 83, T 98.2F, Spo2 98% on RA.  - Labs: WBC 16.38k, CMP unremarkable except K 5.2.  - RUQ US 3/29/25: No cholelithiasis or sonographic evidence of acute cholecystitis. Mildly dilated CBD up to 0.8 cm difficult to compare to prior imaging studies as previously there were stents. Echogenic hepatic parenchyma may be secondary to fatty infiltration or hepatocellular disease.  - CTAP w/ IV contrast 3/29/25: No definite evidence of acute abdominopelvic pathology. Redemonstrated findings of chronic pancreatitis. New pancreatic tail cystic lesion measuring up to 3.8 x 2.3 cm could represent a pancreatic pseudocyst in the appropriate clinical setting if there was any recent evidence of pancreatitis. Slightly more prominent cystic lesions in the pancreatic head/uncinate process.  - ED interventions: 2L LR bolus, IV morphine, ondansetron, famotidine.  - Seen by GI   - LR at 150cc/hr.  - PRN Dilaudid for pain along with standing oxy 20 IR BID (he takes this at home).  - Clear liquid diet. NPO today for EGD/EUS     #HTN  - Holding Losartan for hyperkalemia  - Resume when appropriate     #DM  - Hold metformin.  - Start ISS.    #Misc  - Diet: CLD w/ Ensure clear, CC  - DVT ppx: Enoxaparin  - GI ppx: NI  - Activity: Increase as tolerated  
Assessment and Plan  Case of a 50 year old male patient with a hx of chronic pancreatitis s/p multiple ERCPs and EUS guided celiac plexus block in 10/2024 who presented to the ED on 03/28 for evaluation of worsening RUQ/epigastric pain with nausea and vomiting, found to have increase in pancreatic head and uncinate cystic lesions and new pancreatic tail 3.8x2.3 cm lesion. We are consulted for above findings.      Chronic Pancreatitis with Increase in Pancreatic Head and Uncinate cystic lesions and increase in main PD prominence  New 3.8x2.3 cm Pancreatic tail cystic lesion: likely pseudocyst but r/o other etiologies such as malignancy  R/O PUD  Hepatic Steatosis  No Cholelithiasis  * Hemodynamically stable  * Labs: WBC 16.38, Hb 12.8, Plt 289, Na 139, K 5.2, BUN 12, Cr 0.9 on 03/28 -> WBC 12.84, Hb 13.1, Plt 307, Na 136, K 5.4, BUN 8, Cr 1 on 03/30  * Liver enzymes: 0.3/83/15/13 on 03/28 -> 0.4/90/15/14 on 03/30; lipase 10  * RUQ US 03/29: CBD 0.8cm, echogenic liver, no cholelithiasis  * CT AP IC 03/29 hepatic steatosis; < from: CT Abdomen and Pelvis w/ IV Cont (03.29.25 @ 01:23) >Redemonstrated findings of chronic pancreatitis. New pancreatic tail cystic lesion measuring up to 3.8 x 2.3 cm could represent a pancreatic pseudocyst in the appropriate clinical setting if there was any recent evidence of pancreatitis.Slightly more prominent cystic lesions in the pancreatic head/uncinate process.  * s/p multiples ERCPs. Had ERCP in 04/2023 then removal of PD stent on 12/19/2024  * s/p EUS guided celiac plexus block on 10/11/2024    RECOMMENDATIONS  - Will schedule patient for EGD EUS on 03/31 Monday. Check preop labs tonight: CBC, BMP, Mg, and INR  - Can advance to soft today then keep NPO after midnight. On IV fluid hydration   - Trend liver enzymes  - Monitor for pain: management per team  - Monitor for nausea: continue antiemetics PRN if QTc is within normal  - Monitor BM and avoid constipation  - Continue Protonix 40mg QD for GI Prophylaxis; avoid NSAIDs  - Recommend serial abdominal exams. Encourage ambulation and incentive spirometry  - Monitoring off Abs; monitor MAP and keep > 65mmHg; check blood cultures and get ID evaluation in case of fever  - Follow up with our [facility] located at [address] [place] [address]. Phone Number: [phone]    - Follow up with our [facility] Hep[facility] for fibroscan in setting of hepatic steatosis (located at [address] [place] [address], [facility]. Telephone No: [phone])      Thank you for your consult.  - Please note that plan was communicated with medical team.   - Please reach GI on [phone] during weekdays till 5pm.  - Please call the GI service line after 5pm on Weekdays and anytime on Weekends: [phone].      Oh Galvan MD  PGY - 5 Gastroenterology Fellow   [facility]

## 2025-03-31 NOTE — DISCHARGE NOTE NURSING/CASE MANAGEMENT/SOCIAL WORK - PATIENT PORTAL LINK FT
You can access the FollowMyHealth Patient Portal offered by [facility] by registering at the following website: [email or web address]. By joining [employer]’s FollowMyHealth portal, you will also be able to view your health information using other applications (apps) compatible with our system.

## 2025-03-31 NOTE — DISCHARGE NOTE PROVIDER - CARE PROVIDER_API CALL
Hipolito Post  Gastroenterology  [address] [place] [address]  Phone: [phone]  Fax: [phone]  Follow Up Time: 1 week

## 2025-03-31 NOTE — DISCHARGE NOTE PROVIDER - NSDCCPCAREPLAN_GEN_ALL_CORE_FT
PRINCIPAL DISCHARGE DIAGNOSIS  Diagnosis: Abdominal pain  Assessment and Plan of Treatment: You were admitted for abdominal pain and you were found to have a new pancreatic cyst.  You had and endoscopy and endoscopic ultrasound done. Rest of procedure as detailed by the GI team.  Please take your medications as prescribed and follow up with advanced GI as op      SECONDARY DISCHARGE DIAGNOSES  Diagnosis: Pancreatitis  Assessment and Plan of Treatment:

## 2025-03-31 NOTE — DISCHARGE NOTE PROVIDER - NSDCMRMEDTOKEN_GEN_ALL_CORE_FT
ibuprofen 600 mg oral tablet: 1 tab(s) orally once a day as needed for  moderate pain  losartan 25 mg oral tablet: 1 tab(s) orally once a day  metFORMIN 500 mg oral tablet: 1 tab(s) orally 2 times a day  oxyCODONE 20 mg oral tablet: 1 tab(s) orally 2 times a day  oxycodone-acetaminophen 5 mg-325 mg oral tablet: 4 tab(s) orally every 12 hours As needed Moderate Pain (4 - 6)  Percocet 10/325 oral tablet: 2 tab(s) orally 2 times a day  Zenpep 60,000 units-189,600 units-252,600 units oral delayed release capsule: 3 cap(s) orally 3 times a day (with meals)

## 2025-03-31 NOTE — CHART NOTE - NSCHARTNOTEFT_GEN_A_CORE
PACU ANESTHESIA ADMISSION NOTE      Procedure: EGD, EUS  Post op diagnosis:  Chronic Pancreatitis        __x__  Patent Airway    ____  Full return of protective reflexes    ____  Full recovery from anesthesia / back to baseline     Vitals:   T:   97.2 F        R:   12               BP:  159/99                Sat:  99%                 P: 69      Mental Status:  __x__ Awake   _____ Alert   _____ Drowsy   _____ Sedated    Nausea/Vomiting:  ____ NO  ______Yes,   See Post - Op Orders          Pain Scale (0-10):  _____    Treatment: ____ None    ___x_ See Post - Op/PCA Orders    Post - Operative Fluids:   ____ Oral   __x__ See Post - Op Orders    Plan: Discharge:   ____Home       __x___Floor     _____Critical Care    _____  Other:_________________    Comments: Pt tolerated procedure well, no anesthesia related complications. Care of pt endorsed to PACU, report given to PACU RN. Discharge when criteria are met.

## 2025-03-31 NOTE — DISCHARGE NOTE NURSING/CASE MANAGEMENT/SOCIAL WORK - FINANCIAL ASSISTANCE
[facility] provides services at a reduced cost to those who are determined to be eligible through [facility]’s financial assistance program. Information regarding [facility]’s financial assistance program can be found by going to [email or web address] or by calling [phone].

## 2025-03-31 NOTE — PROGRESS NOTE ADULT - ATTENDING COMMENTS
49 yo male Pmhx Pancreatitis, gallstones - previously had ercp with biliary stents and sphincterotomy by Dr. Post; stents eventually removed 12/24; pt now presents to Ed c/o RUQ/epigastric abdominal pain.    General: NAD, comfortable in bed  HEENT: NCAT  Lungs: No increased WOB  CVS: RRR  Abdomen: , non-distended  Extremities: no edema      #Abdominal pain 2/2 pancreatic pseudocyst vs chronic pancreatitis  #HTN  #DM  Presentation likely related to chronic pancreatitis. Cystic lesion could be a pseudocyst, however needs further evaluation. Pain controlled currently, has  not required IV Dilaudid for the last 24 hours. Plan to d/c after EGD/EUS is completed if GI agreeable    Rest per resident's note    Family discussion:  plan of care was discussed with patient and family in details.  all questions were answered.  seems to understand, and in agreement

## 2025-04-01 ENCOUNTER — TRANSCRIPTION ENCOUNTER (OUTPATIENT)
Age: 50
End: 2025-04-01

## 2025-04-01 LAB
AMYLASE FLD-CCNC: SIGNIFICANT CHANGE UP U/L
GLUCOSE FLD-MCNC: 85 MG/DL — SIGNIFICANT CHANGE UP

## 2025-04-02 ENCOUNTER — TRANSCRIPTION ENCOUNTER (OUTPATIENT)
Age: 50
End: 2025-04-02

## 2025-04-03 ENCOUNTER — TRANSCRIPTION ENCOUNTER (OUTPATIENT)
Age: 50
End: 2025-04-03

## 2025-04-03 ENCOUNTER — NON-APPOINTMENT (OUTPATIENT)
Age: 50
End: 2025-04-03

## 2025-04-04 ENCOUNTER — TRANSCRIPTION ENCOUNTER (OUTPATIENT)
Age: 50
End: 2025-04-04

## 2025-04-04 ENCOUNTER — NON-APPOINTMENT (OUTPATIENT)
Age: 50
End: 2025-04-04

## 2025-04-04 DIAGNOSIS — Z79.84 LONG TERM (CURRENT) USE OF ORAL HYPOGLYCEMIC DRUGS: ICD-10-CM

## 2025-04-04 DIAGNOSIS — E11.9 TYPE 2 DIABETES MELLITUS WITHOUT COMPLICATIONS: ICD-10-CM

## 2025-04-04 DIAGNOSIS — K86.1 OTHER CHRONIC PANCREATITIS: ICD-10-CM

## 2025-04-04 DIAGNOSIS — I10 ESSENTIAL (PRIMARY) HYPERTENSION: ICD-10-CM

## 2025-04-04 DIAGNOSIS — K76.0 FATTY (CHANGE OF) LIVER, NOT ELSEWHERE CLASSIFIED: ICD-10-CM

## 2025-04-04 DIAGNOSIS — K86.3 PSEUDOCYST OF PANCREAS: ICD-10-CM

## 2025-04-04 LAB
CEA FLD-MCNC: 419 NG/ML — SIGNIFICANT CHANGE UP
SPECIMEN SOURCE: SIGNIFICANT CHANGE UP

## 2025-04-11 ENCOUNTER — NON-APPOINTMENT (OUTPATIENT)
Age: 50
End: 2025-04-11

## 2025-04-29 NOTE — ASU PATIENT PROFILE, ADULT - AS SC BRADEN MOISTURE
4/2025: chronic timeframe of symptoms    Prior work-up:  5/2024: CT chest, no central PE; right basilar chronic scarring  7/2024: Echo -EF 60%; no significant valvular disease   (4) rarely moist

## 2025-04-30 ENCOUNTER — APPOINTMENT (OUTPATIENT)
Dept: GASTROENTEROLOGY | Facility: CLINIC | Age: 50
End: 2025-04-30
Payer: COMMERCIAL

## 2025-04-30 VITALS
DIASTOLIC BLOOD PRESSURE: 98 MMHG | WEIGHT: 145 LBS | BODY MASS INDEX: 21.48 KG/M2 | HEART RATE: 83 BPM | HEIGHT: 69 IN | SYSTOLIC BLOOD PRESSURE: 137 MMHG

## 2025-04-30 DIAGNOSIS — K86.89 OTHER SPECIFIED DISEASES OF PANCREAS: ICD-10-CM

## 2025-04-30 DIAGNOSIS — F17.200 NICOTINE DEPENDENCE, UNSPECIFIED, UNCOMPLICATED: ICD-10-CM

## 2025-04-30 DIAGNOSIS — D49.0 NEOPLASM OF UNSPECIFIED BEHAVIOR OF DIGESTIVE SYSTEM: ICD-10-CM

## 2025-04-30 DIAGNOSIS — R10.9 UNSPECIFIED ABDOMINAL PAIN: ICD-10-CM

## 2025-04-30 DIAGNOSIS — K86.1 OTHER CHRONIC PANCREATITIS: ICD-10-CM

## 2025-04-30 DIAGNOSIS — Z78.9 OTHER SPECIFIED HEALTH STATUS: ICD-10-CM

## 2025-04-30 PROCEDURE — 99214 OFFICE O/P EST MOD 30 MIN: CPT

## 2025-05-05 ENCOUNTER — TRANSCRIPTION ENCOUNTER (OUTPATIENT)
Age: 50
End: 2025-05-05

## 2025-05-09 LAB
CANCER AG19-9 SERPL-ACNC: 7 U/ML
CEA SERPL-MCNC: 6.7 NG/ML

## 2025-05-12 ENCOUNTER — APPOINTMENT (OUTPATIENT)
Dept: ENDOCRINOLOGY | Facility: CLINIC | Age: 50
End: 2025-05-12
Payer: COMMERCIAL

## 2025-05-12 ENCOUNTER — NON-APPOINTMENT (OUTPATIENT)
Age: 50
End: 2025-05-12

## 2025-05-12 ENCOUNTER — TRANSCRIPTION ENCOUNTER (OUTPATIENT)
Age: 50
End: 2025-05-12

## 2025-05-12 VITALS
HEART RATE: 93 BPM | BODY MASS INDEX: 28.07 KG/M2 | DIASTOLIC BLOOD PRESSURE: 60 MMHG | HEIGHT: 60 IN | SYSTOLIC BLOOD PRESSURE: 108 MMHG | WEIGHT: 143 LBS | OXYGEN SATURATION: 99 %

## 2025-05-12 DIAGNOSIS — R97.0 ELEVATED CARCINOEMBRYONIC ANTIGEN [CEA]: ICD-10-CM

## 2025-05-12 DIAGNOSIS — K86.1 OTHER CHRONIC PANCREATITIS: ICD-10-CM

## 2025-05-12 DIAGNOSIS — D72.829 ELEVATED WHITE BLOOD CELL COUNT, UNSPECIFIED: ICD-10-CM

## 2025-05-12 DIAGNOSIS — E11.9 TYPE 2 DIABETES MELLITUS W/OUT COMPLICATIONS: ICD-10-CM

## 2025-05-12 PROCEDURE — 99213 OFFICE O/P EST LOW 20 MIN: CPT

## 2025-05-15 ENCOUNTER — TRANSCRIPTION ENCOUNTER (OUTPATIENT)
Age: 50
End: 2025-05-15

## 2025-05-19 ENCOUNTER — TRANSCRIPTION ENCOUNTER (OUTPATIENT)
Age: 50
End: 2025-05-19

## 2025-05-20 ENCOUNTER — TRANSCRIPTION ENCOUNTER (OUTPATIENT)
Age: 50
End: 2025-05-20

## 2025-06-11 ENCOUNTER — APPOINTMENT (OUTPATIENT)
Dept: GASTROENTEROLOGY | Facility: CLINIC | Age: 50
End: 2025-06-11

## 2025-06-11 VITALS
WEIGHT: 144 LBS | HEART RATE: 89 BPM | HEIGHT: 69 IN | DIASTOLIC BLOOD PRESSURE: 109 MMHG | SYSTOLIC BLOOD PRESSURE: 152 MMHG | BODY MASS INDEX: 21.33 KG/M2

## 2025-06-25 ENCOUNTER — APPOINTMENT (OUTPATIENT)
Dept: GASTROENTEROLOGY | Facility: CLINIC | Age: 50
End: 2025-06-25

## 2025-07-09 ENCOUNTER — NON-APPOINTMENT (OUTPATIENT)
Age: 50
End: 2025-07-09

## 2025-07-15 ENCOUNTER — APPOINTMENT (OUTPATIENT)
Dept: SURGERY | Facility: CLINIC | Age: 50
End: 2025-07-15

## 2025-07-30 DIAGNOSIS — K86.1 OTHER CHRONIC PANCREATITIS: ICD-10-CM

## 2025-07-30 DIAGNOSIS — R10.9 UNSPECIFIED ABDOMINAL PAIN: ICD-10-CM
